# Patient Record
Sex: FEMALE | Race: WHITE | NOT HISPANIC OR LATINO | Employment: FULL TIME | URBAN - METROPOLITAN AREA
[De-identification: names, ages, dates, MRNs, and addresses within clinical notes are randomized per-mention and may not be internally consistent; named-entity substitution may affect disease eponyms.]

---

## 2017-01-03 ENCOUNTER — MYC MEDICAL ADVICE (OUTPATIENT)
Dept: FAMILY MEDICINE | Facility: OTHER | Age: 32
End: 2017-01-03

## 2017-01-03 DIAGNOSIS — H10.33 ACUTE BACTERIAL CONJUNCTIVITIS OF BOTH EYES: Primary | ICD-10-CM

## 2017-01-03 DIAGNOSIS — J01.90 ACUTE SINUSITIS WITH SYMPTOMS > 10 DAYS: ICD-10-CM

## 2017-01-03 RX ORDER — DOXYCYCLINE 100 MG/1
100 CAPSULE ORAL 2 TIMES DAILY
Qty: 20 CAPSULE | Refills: 0 | Status: SHIPPED | OUTPATIENT
Start: 2017-01-03 | End: 2017-01-13

## 2017-01-03 RX ORDER — POLYMYXIN B SULFATE AND TRIMETHOPRIM 1; 10000 MG/ML; [USP'U]/ML
1 SOLUTION OPHTHALMIC EVERY 4 HOURS
Qty: 1 BOTTLE | Refills: 0 | Status: SHIPPED | OUTPATIENT
Start: 2017-01-03 | End: 2017-04-21

## 2017-02-08 ENCOUNTER — TELEPHONE (OUTPATIENT)
Dept: FAMILY MEDICINE | Facility: OTHER | Age: 32
End: 2017-02-08

## 2017-02-08 NOTE — TELEPHONE ENCOUNTER
Reason for call:  Same Day Appointment  Reason for Call:  Same Day Appointment, Requested Provider:  MIRLANDE Priest     PCP: Brie Cuellar    Reason for visit: work comp injury 2/8/17, right wrist and thumb    Duration of symptoms: today    Have you been treated for this in the past? No    Additional comments: needs to be seen within 24 hours due to being WC.  No openings ERC, ZMC, RG, PMC    Can we leave a detailed message on this number? YES    Phone number patient can be reached at: Home number on file [phone] (home)    Best Time: any      Call taken on 2/8/2017 at 3:38 PM by Arianna Dominguez

## 2017-02-09 NOTE — TELEPHONE ENCOUNTER
Patient was able to get an appointment elsewhere. No longer needs appointment.  [place], [facility]

## 2017-02-09 NOTE — TELEPHONE ENCOUNTER
This was from yesterday. Does it still need to be addressed? If so, the only place I could work in is 5 pm today. Otherwise can check with others or wait until tomorrow.     Shawn Cuellar PA-C   [facility]

## 2017-02-21 ENCOUNTER — TELEPHONE (OUTPATIENT)
Dept: FAMILY MEDICINE | Facility: OTHER | Age: 32
End: 2017-02-21

## 2017-02-21 NOTE — TELEPHONE ENCOUNTER
"Leah Casarez is a 31 year old female     PRESENTING PROBLEM:      NURSING ASSESSMENT:  Description:  Patient calls to report that her current period and last period have been heavier than normal.  She reports last period was normal and red then slowed down and turned more pink/brown.  She started experiencing breast pain around that time and took and HPT.  HPT negative.  She reports that was previously on OCP x 6 months.  She Stopped OCP in November as she is TCC.  Her breasts continue to be painful. . Her current period started on 02/14 (approximate).  She reports it has been heavy and is persisting.  She is wearing a medium absorbancy pad.  She reports going through 5-6 per day.  She reports \"it went away Thursday and returned that night after intercourse.\"  She has not taken an HPT recently.  She is not experiencing any severe cramping, dizziness, weakness, confusion.  She does report feeling \"a little more tired than usual.\"   Onset/duration:  X 7 days   Precip. factors:  Recently stopped OCP, TTC  Associated symptoms:  See above  Pain scale (0-10)   Mild cramping  I & O/eating:   Not assessed  Activity:  Per normal  Temp.:  afebrile  Weight:    Wt Readings from Last 2 Encounters:   12/30/16 240 lb 14.4 oz (109.3 kg)   08/03/16 245 lb (111.1 kg)       Allergies: No Known Allergies  Last exam/Treatment:  12/30/2017  Contact Phone Number:  Home number on file    NURSING PLAN: Recommend retaking HPT to confirm patient is not pregnant.  If positive,call back.  Reassured patient that it may take a few months for cycles to regulate after stopping OCP.  She be seen right away if saturated >1 pad per hour over 6 hours, severe cramping and or feeling dizzy, weak, lightheaded, confused.  Call back if period persists through upcoming weekend.    RECOMMENDED DISPOSITION:  Home care advice - see nursing plan  Will comply with recommendation: Yes  If further questions/concerns or if symptoms do not improve, worsen or " new symptoms develop, call your PCP or [facility] as soon as possible.    Lena Paul RN

## 2017-02-21 NOTE — TELEPHONE ENCOUNTER
Reason for call:  Symptom  Reason for call:  Patient reporting a symptom    Symptom or request: ongoing heavy periods    Duration (how long have symptoms been present): 6 days    Have you been treated for this before? No    Additional comments: patient calling to go over heavy flow that they are having. Patient stated that it seems that it is a lot darker blood than bright red, has normal cramping with it (not everyday for cramping). Having breast pain for 3 weeks. Declined urgent triage line if needed    Phone Number patient can be reached at:  Cell number on file:    Telephone Information:   Mobile [phone]       Best Time:  any    Can we leave a detailed message on this number:  YES    Call taken on 2/21/2017 at 3:42 PM by Dorota Kenny

## 2017-04-21 ENCOUNTER — ALLIED HEALTH/NURSE VISIT (OUTPATIENT)
Dept: FAMILY MEDICINE | Facility: OTHER | Age: 32
End: 2017-04-21
Payer: COMMERCIAL

## 2017-04-21 VITALS — WEIGHT: 234 LBS | BODY MASS INDEX: 34.86 KG/M2

## 2017-04-21 DIAGNOSIS — Z32.00 PREGNANCY EXAMINATION OR TEST, PREGNANCY UNCONFIRMED: Primary | ICD-10-CM

## 2017-04-21 LAB — BETA HCG QUAL IFA URINE: POSITIVE

## 2017-04-21 PROCEDURE — 84703 CHORIONIC GONADOTROPIN ASSAY: CPT | Performed by: ADVANCED PRACTICE MIDWIFE

## 2017-04-21 ASSESSMENT — PAIN SCALES - GENERAL: PAINLEVEL: NO PAIN (0)

## 2017-04-21 NOTE — PROGRESS NOTES
Leah Casarez is a 31 year old here today for a pregnancy test.  LMP: Patient's last menstrual period was 2017 (exact date).  Wt: 0 lbs 0 oz.    Symptoms include weight gain, breast tenderness, absence of menses and fatigue.    Leah informed of positive pregnancy test results. JACKELIN: 17    Educational advice given: nutrition, smoking and drugs & alcohol.    Current medications reviewed: Yes    Previous pregnancy history remarkable for: none.    Plan: follow-up appointment with Rosie Layton for pre- care, take multivitamin or pre-beverly vitamins and OB Education packet given.    She is to call back if she has any questions or concerns.  She is advised to notify a provider immediately if she experiences any severe cramping or abdominal pain or any vaginal bleeding.    Audrey Rodgers, RN, BSN

## 2017-04-21 NOTE — PATIENT INSTRUCTIONS
"According to your last menstrual period, you are approximately 4 weeks and 6 days pregnant.  Your estimated due date is 12/23/17.    To do list:  1. If you have not already started taking a prenatal vitamin, please start today.  2. Visit with our OB Intake nurse, Fallon: this can be scheduled any time between today and the first visit with your provider. We do require that you see OB intake before your \"first OB\" visit.       Mondays- [place] (11am-7pm)       Tuesdays- [place] (9am-noon)/ [place] (1pm-4pm)       Wednesdays- [place] (8am-11am)       Thursdays- [place] (9am-3pm)  3. The first OB visit with Melanie Layton CNM is scheduled between 10 and 12 weeks: 5/27-6/10.    To make these appointments, or if you have any questions and would like to speak to your care team, you can call the clinic's main phone number:       [facility]: [phone]       [facility]: [phone]       [place]: [phone]       [facility]: [phone]       [facility]: [phone]    Please remember, we would like to hear from you if you have any vaginal bleeding or any moderate to severe abdominal pain/cramping. You can call any of the phone numbers above and speak with an RN promptly, even if it is after clinic hours, someone will answer your call.    Thank you for choosing [place], and Congratulations!    Audrey Rodgers RN, BSN      "

## 2017-04-21 NOTE — MR AVS SNAPSHOT
"              After Visit Summary   4/21/2017    Leah Casarez    MRN: [id number]           Patient Information     Date Of Birth          1985        Visit Information        Provider Department      4/21/2017 2:00 PM NL RN TEAM A, [facility]        Today's Diagnoses     Pregnancy examination or test, pregnancy unconfirmed    -  1      Care Instructions    According to your last menstrual period, you are approximately 4 weeks and 6 days pregnant.  Your estimated due date is 12/23/17.    To do list:  1. If you have not already started taking a prenatal vitamin, please start today.  2. Visit with our OB Intake nurse, Fallon: this can be scheduled any time between today and the first visit with your provider. We do require that you see OB intake before your \"first OB\" visit.       Mondays- [place] (11am-7pm)       Tuesdays- [place] (9am-noon)/ [place] (1pm-4pm)       Wednesdays- [place] (8am-11am)       Thursdays- [place] (9am-3pm)  3. The first OB visit with Melanie Layton CNM is scheduled between 10 and 12 weeks: 5/27-6/10.    To make these appointments, or if you have any questions and would like to speak to your care team, you can call the clinic's main phone number:       [facility]: [phone]       [facility]: [phone]       [facility]: [phone]       [facility]: [phone]       [facility]: [phone]    Please remember, we would like to hear from you if you have any vaginal bleeding or any moderate to severe abdominal pain/cramping. You can call any of the phone numbers above and speak with an RN promptly, even if it is after clinic hours, someone will answer your call.    Thank you for choosing [facility], and Congratulations!    Audrey Rodgers RN, BSN            Follow-ups after your visit        Your next 10 appointments already scheduled     Apr 25, 2017  3:30 PM CDT   New Prenatal with ESTELLA Marmolejo [facility]view " [facility] ([facility])    [address] [facility] [address] [phone]              Who to contact     If you have questions or need follow up information about today's clinic visit or your schedule please contact [facility] directly at [phone].  Normal or non-critical lab and imaging results will be communicated to you by MyChart, letter or phone within 4 business days after the clinic has received the results. If you do not hear from us within 7 days, please contact the clinic through MyChart or phone. If you have a critical or abnormal lab result, we will notify you by phone as soon as possible.  Submit refill requests through [employer] or call your pharmacy and they will forward the refill request to us. Please allow 3 business days for your refill to be completed.          Additional Information About Your Visit        [email or web address] Information     [employer] gives you secure access to your electronic health record. If you see a primary care provider, you can also send messages to your care team and make appointments. If you have questions, please call your primary care clinic.  If you do not have a primary care provider, please call [phone] and they will assist you.        Care EveryWhere ID     This is your Care EveryWhere ID. This could be used by other organizations to access your [place] medical records  [id number]        Your Vitals Were     Last Period Breastfeeding? BMI (Body Mass Index)             03/18/2017 (Exact Date) No 34.86 kg/m2          Blood Pressure from Last 3 Encounters:   12/30/16 116/62   08/03/16 124/80   02/18/16 112/66    Weight from Last 3 Encounters:   04/21/17 234 lb (106.1 kg)   12/30/16 240 lb 14.4 oz (109.3 kg)   08/03/16 245 lb (111.1 kg)              We Performed the Following     Beta HCG qual IFA urine        Primary Care Provider Office Phone # Fax #    Brie Cuellar PA-C [phone]        [facility] [address]  [facility] [address]        Thank you!     Thank you for choosing [facility]  for your care. Our goal is always to provide you with excellent care. Hearing back from our patients is one way we can continue to improve our services. Please take a few minutes to complete the written survey that you may receive in the mail after your visit with us. Thank you!             Your Updated Medication List - Protect others around you: Learn how to safely use, store and throw away your medicines at www.disposemymeds.org.          This list is accurate as of: 4/21/17  3:35 PM.  Always use your most recent med list.                   Brand Name Dispense Instructions for use    PRENATAL VITAMIN PO

## 2017-06-07 ENCOUNTER — RADIANT APPOINTMENT (OUTPATIENT)
Dept: ULTRASOUND IMAGING | Facility: OTHER | Age: 32
End: 2017-06-07
Attending: PHYSICIAN ASSISTANT
Payer: COMMERCIAL

## 2017-06-07 ENCOUNTER — PRENATAL OFFICE VISIT (OUTPATIENT)
Dept: FAMILY MEDICINE | Facility: OTHER | Age: 32
End: 2017-06-07
Payer: COMMERCIAL

## 2017-06-07 VITALS — BODY MASS INDEX: 34.51 KG/M2 | HEIGHT: 69 IN | WEIGHT: 233 LBS

## 2017-06-07 DIAGNOSIS — Z34.80 OTHER NORMAL PREGNANCY, NOT FIRST: Primary | ICD-10-CM

## 2017-06-07 DIAGNOSIS — Z34.80 OTHER NORMAL PREGNANCY, NOT FIRST: ICD-10-CM

## 2017-06-07 LAB
ALBUMIN UR-MCNC: NEGATIVE MG/DL
APPEARANCE UR: CLEAR
BILIRUB UR QL STRIP: NEGATIVE
COLOR UR AUTO: YELLOW
ERYTHROCYTE [DISTWIDTH] IN BLOOD BY AUTOMATED COUNT: 12.8 % (ref 10–15)
GLUCOSE UR STRIP-MCNC: NEGATIVE MG/DL
HCT VFR BLD AUTO: 35.3 % (ref 35–47)
HGB BLD-MCNC: 11.9 G/DL (ref 11.7–15.7)
HGB UR QL STRIP: NEGATIVE
KETONES UR STRIP-MCNC: NEGATIVE MG/DL
LEUKOCYTE ESTERASE UR QL STRIP: ABNORMAL
MCH RBC QN AUTO: 31.1 PG (ref 26.5–33)
MCHC RBC AUTO-ENTMCNC: 33.7 G/DL (ref 31.5–36.5)
MCV RBC AUTO: 92 FL (ref 78–100)
NITRATE UR QL: NEGATIVE
PH UR STRIP: 6 PH (ref 5–7)
PLATELET # BLD AUTO: 294 10E9/L (ref 150–450)
RBC # BLD AUTO: 3.83 10E12/L (ref 3.8–5.2)
RBC #/AREA URNS AUTO: NORMAL /HPF (ref 0–2)
SP GR UR STRIP: 1.01 (ref 1–1.03)
URN SPEC COLLECT METH UR: ABNORMAL
UROBILINOGEN UR STRIP-ACNC: 1 EU/DL (ref 0.2–1)
WBC # BLD AUTO: 11.4 10E9/L (ref 4–11)
WBC #/AREA URNS AUTO: NORMAL /HPF (ref 0–2)

## 2017-06-07 PROCEDURE — 87340 HEPATITIS B SURFACE AG IA: CPT | Performed by: FAMILY MEDICINE

## 2017-06-07 PROCEDURE — 86780 TREPONEMA PALLIDUM: CPT | Performed by: FAMILY MEDICINE

## 2017-06-07 PROCEDURE — 76801 OB US < 14 WKS SINGLE FETUS: CPT

## 2017-06-07 PROCEDURE — 86850 RBC ANTIBODY SCREEN: CPT | Performed by: FAMILY MEDICINE

## 2017-06-07 PROCEDURE — 86762 RUBELLA ANTIBODY: CPT | Performed by: FAMILY MEDICINE

## 2017-06-07 PROCEDURE — 85027 COMPLETE CBC AUTOMATED: CPT | Performed by: FAMILY MEDICINE

## 2017-06-07 PROCEDURE — 87389 HIV-1 AG W/HIV-1&-2 AB AG IA: CPT | Performed by: FAMILY MEDICINE

## 2017-06-07 PROCEDURE — 87086 URINE CULTURE/COLONY COUNT: CPT | Performed by: FAMILY MEDICINE

## 2017-06-07 PROCEDURE — 81001 URINALYSIS AUTO W/SCOPE: CPT | Performed by: FAMILY MEDICINE

## 2017-06-07 PROCEDURE — 86901 BLOOD TYPING SEROLOGIC RH(D): CPT | Performed by: FAMILY MEDICINE

## 2017-06-07 PROCEDURE — 36415 COLL VENOUS BLD VENIPUNCTURE: CPT | Performed by: FAMILY MEDICINE

## 2017-06-07 PROCEDURE — 86900 BLOOD TYPING SEROLOGIC ABO: CPT | Performed by: FAMILY MEDICINE

## 2017-06-07 NOTE — MR AVS SNAPSHOT
After Visit Summary   6/7/2017    Leah Casarez    MRN: [id number]           Patient Information     Date Of Birth          1985        Visit Information        Provider Department      6/7/2017 1:00 PM NL OB INTAKE [facility]        Today's Diagnoses     Other normal pregnancy, not first    -  1       Follow-ups after your visit        Your next 10 appointments already scheduled     Jun 07, 2017  2:30 PM CDT   US OB < 14 WEEKS SINGLE with ZMUS1   [facility]    [address] [id number] [phone]           Please bring a list of your medicines (including vitamins, minerals and over-the-counter drugs). Also, tell your doctor about any allergies you may have. Wear comfortable clothes and leave your valuables at home.  If you re less than 20 weeks drink four 8-ounce glasses of fluid an hour before your exam. If you need to empty your bladder before your exam, try to release only a little urine. Then, drink another glass of fluid.  You may have up to two family members in the exam room. If you bring a small child, an adult must be there to care for him or her.  Please call the Imaging Department at your exam site with any questions.            Jun 21, 2017  2:00 PM CDT   ESTABLISHED PRENATAL with Payal Messina PA-C   [facility]    [address] [place] [address]   [phone]            Jul 12, 2017  1:15 PM CDT   ESTABLISHED PRENATAL with Payal Messina PA-C   [facility]    [address] [place] [address]   [phone]            Aug 08, 2017  4:00 PM CDT   ESTABLISHED PRENATAL with Payal Messina PA-C   [facility]    [address] [place] [address]   [phone]              Future tests that were ordered for you today      "Open Future Orders        Priority Expected Expires Ordered    US OB < 14 Weeks Single Routine  6/7/2018 6/7/2017            Who to contact     If you have questions or need follow up information about today's clinic visit or your schedule please contact [facility] directly at [phone].  Normal or non-critical lab and imaging results will be communicated to you by MyChart, letter or phone within 4 business days after the clinic has received the results. If you do not hear from us within 7 days, please contact the clinic through [employer] or phone. If you have a critical or abnormal lab result, we will notify you by phone as soon as possible.  Submit refill requests through [employer] or call your pharmacy and they will forward the refill request to us. Please allow 3 business days for your refill to be completed.          Additional Information About Your Visit        MyChart Information     [employer] gives you secure access to your electronic health record. If you see a primary care provider, you can also send messages to your care team and make appointments. If you have questions, please call your primary care clinic.  If you do not have a primary care provider, please call [phone] and they will assist you.        Care EveryWhere ID     This is your Care EveryWhere ID. This could be used by other organizations to access your [place] medical records  [id number]        Your Vitals Were     Height Last Period BMI (Body Mass Index)             5' 9\" (1.753 m) 03/18/2017 (Exact Date) 34.41 kg/m2          Blood Pressure from Last 3 Encounters:   12/30/16 116/62   08/03/16 124/80   02/18/16 112/66    Weight from Last 3 Encounters:   06/07/17 233 lb (105.7 kg)   04/21/17 234 lb (106.1 kg)   12/30/16 240 lb 14.4 oz (109.3 kg)              We Performed the Following     ABO/Rh type and screen     Anti treponema EIA     CBC WITH PLATELETS     HEPATITIS B SURFACE ANTIGEN     HIV Antigen Antibody Combo     " Rubella Antibody IgG Quantitative     Urine Culture Aerobic Bacterial     URINE MACROSCOPIC WITH REFLEX TO MICRO     Urine Microscopic        Primary Care Provider Office Phone # Fax #    Brie Cuellar PA-C [phone]       [facility] [address]  [facility] [address]        Thank you!     Thank you for choosing [facility]  for your care. Our goal is always to provide you with excellent care. Hearing back from our patients is one way we can continue to improve our services. Please take a few minutes to complete the written survey that you may receive in the mail after your visit with us. Thank you!             Your Updated Medication List - Protect others around you: Learn how to safely use, store and throw away your medicines at www.disposemymeds.org.          This list is accurate as of: 6/7/17  1:35 PM.  Always use your most recent med list.                   Brand Name Dispense Instructions for use    PRENATAL VITAMIN PO

## 2017-06-07 NOTE — PROGRESS NOTES
1. Have you had chicken pox?   Yes    Genetic Screening    Has the patient, baby's father, or anyone in either family had:     1. Thalassemia and and MCV result less than 80?No   2.  Neural tube defect? No   3.  Congenital heart defect?No   4. Down's syndrome?No   5.  Colten-Sachs disease?No   6. Sickle Cell disease or trait?No   7. Hemophilia or other inherited problems of blood?No   8. Muscular dystropy?No   9.  Cystic fibrosis?No  10. Naylor's Chorea?No  11. Mental Retardation or autism?  No         If yes, was the person tested for Fragile X?   12. Any other inherited genetic or chromosomal disorders?No  13. Metabolic disorders such as diabetes or PKU?No  14. A child born with defects not listed above?No  15. Recurrent pregnancy loss or stillbirth?No  16.  Has the patient had any medications/street drugs/alcohol since her last menstrual period?No  18.  Does the patient or baby's father have any other genetic risks. No

## 2017-06-08 LAB
ABO + RH BLD: NORMAL
ABO + RH BLD: NORMAL
BLD GP AB SCN SERPL QL: NORMAL
BLOOD BANK CMNT PATIENT-IMP: NORMAL
HBV SURFACE AG SERPL QL IA: NONREACTIVE
HIV 1+2 AB+HIV1 P24 AG SERPL QL IA: NORMAL
SPECIMEN EXP DATE BLD: NORMAL

## 2017-06-09 LAB
RUBV IGG SERPL IA-ACNC: 44 IU/ML
T PALLIDUM IGG+IGM SER QL: NEGATIVE

## 2017-06-10 LAB
BACTERIA SPEC CULT: NORMAL
MICRO REPORT STATUS: NORMAL
SPECIMEN SOURCE: NORMAL

## 2017-06-10 NOTE — PROGRESS NOTES
Laeh - Your early ultrasound shows a normally progressing pregnancy with due date of 11/22.  We will review and discuss further at your upcoming first OB visit.  There is a subchorionic hemorrhage noted. These are a common finding in early ultrasounds and do not cause harm to the baby.  We do sometimes see some spotting and bleeding with these.  Let me know if you have any questions.

## 2017-06-21 ENCOUNTER — PRENATAL OFFICE VISIT (OUTPATIENT)
Dept: FAMILY MEDICINE | Facility: CLINIC | Age: 32
End: 2017-06-21
Payer: COMMERCIAL

## 2017-06-21 VITALS
SYSTOLIC BLOOD PRESSURE: 122 MMHG | HEART RATE: 78 BPM | WEIGHT: 229 LBS | TEMPERATURE: 98.2 F | BODY MASS INDEX: 33.82 KG/M2 | OXYGEN SATURATION: 99 % | DIASTOLIC BLOOD PRESSURE: 88 MMHG | RESPIRATION RATE: 18 BRPM

## 2017-06-21 DIAGNOSIS — Z34.00 SUPERVISION OF NORMAL FIRST PREGNANCY, ANTEPARTUM: Primary | ICD-10-CM

## 2017-06-21 PROCEDURE — 87591 N.GONORRHOEAE DNA AMP PROB: CPT | Performed by: PHYSICIAN ASSISTANT

## 2017-06-21 PROCEDURE — 99207 ZZC FIRST OB VISIT: CPT | Performed by: PHYSICIAN ASSISTANT

## 2017-06-21 PROCEDURE — G0145 SCR C/V CYTO,THINLAYER,RESCR: HCPCS | Performed by: PHYSICIAN ASSISTANT

## 2017-06-21 PROCEDURE — 87624 HPV HI-RISK TYP POOLED RSLT: CPT | Performed by: PHYSICIAN ASSISTANT

## 2017-06-21 PROCEDURE — 87491 CHLMYD TRACH DNA AMP PROBE: CPT | Performed by: PHYSICIAN ASSISTANT

## 2017-06-21 ASSESSMENT — PAIN SCALES - GENERAL: PAINLEVEL: NO PAIN (0)

## 2017-06-21 NOTE — NURSING NOTE
"Chief Complaint   Patient presents with     Prenatal Care       Initial /88  Pulse 78  Temp 98.2  F (36.8  C) (Tympanic)  Resp 18  Wt 229 lb (103.9 kg)  LMP 03/18/2017 (Exact Date)  SpO2 99%  BMI 33.82 kg/m2 Estimated body mass index is 33.82 kg/(m^2) as calculated from the following:    Height as of 6/7/17: 5' 9\" (1.753 m).    Weight as of this encounter: 229 lb (103.9 kg).  BP completed using cuff size: fatuma Bynum MA      "

## 2017-06-21 NOTE — MR AVS SNAPSHOT
After Visit Summary   6/21/2017    Leah Casarez    MRN: [id number]           Patient Information     Date Of Birth          1985        Visit Information        Provider Department      6/21/2017 2:00 PM Payal Messina PA-C [facility]        Care Instructions    Suggested Magnesium 400mg daily along with Riboflavin (Vitamin B2) 200mg daily.  Both of these products help prevent migraine headaches.             Follow-ups after your visit        Your next 10 appointments already scheduled     Jul 12, 2017  1:15 PM CDT   ESTABLISHED PRENATAL with Payal Messina PA-C   [facility] ([facility]    [address] [place] [address] [phone]            Aug 08, 2017  4:00 PM CDT   ESTABLISHED PRENATAL with Payal Messina PA-C   [facility] ([facility])    [address] [place] [id number] [phone]              Who to contact     If you have questions or need follow up information about today's clinic visit or your schedule please contact [facility] directly at [phone].  Normal or non-critical lab and imaging results will be communicated to you by MyChart, letter or phone within 4 business days after the clinic has received the results. If you do not hear from us within 7 days, please contact the clinic through [employer] or phone. If you have a critical or abnormal lab result, we will notify you by phone as soon as possible.  Submit refill requests through [employer] or call your pharmacy and they will forward the refill request to us. Please allow 3 business days for your refill to be completed.          Additional Information About Your Visit        MyChart Information     [employer] gives you secure access to your electronic health record. If you see a primary care provider, you can also send messages to your care team and make appointments. If you have  questions, please call your primary care clinic.  If you do not have a primary care provider, please call [phone] and they will assist you.        Care EveryWhere ID     This is your Care EveryWhere ID. This could be used by other organizations to access your [place] medical records  [id number]        Your Vitals Were     Pulse Temperature Respirations Last Period Pulse Oximetry BMI (Body Mass Index)    78 98.2  F (36.8  C) (Tympanic) 18 03/18/2017 (Exact Date) 99% 33.82 kg/m2       Blood Pressure from Last 3 Encounters:   06/21/17 122/88   12/30/16 116/62   08/03/16 124/80    Weight from Last 3 Encounters:   06/21/17 229 lb (103.9 kg)   06/07/17 233 lb (105.7 kg)   04/21/17 234 lb (106.1 kg)              Today, you had the following     No orders found for display       Primary Care Provider Office Phone # Fax #    Brie L CATIA Cuellar [phone]       [facility] [address]  [facility] [address]        Equal Access to Services     SANJUANITA HAMILTON : Hadii aad ku hadasho Soomaali, waaxda luqadaha, qaybta kaalmada adeegyada, maddy yu haymichn jovon granados . So [facility] [phone].    ATENCIÓN: Si habla español, tiene a issa disposición servicios gratuitos de asistencia lingüística. Llame al [phone].    We comply with applicable federal civil rights laws and [place] laws. We do not discriminate on the basis of race, color, national origin, age, disability sex, sexual orientation or gender identity.            Thank you!     Thank you for choosing [facility]  for your care. Our goal is always to provide you with excellent care. Hearing back from our patients is one way we can continue to improve our services. Please take a few minutes to complete the written survey that you may receive in the mail after your visit with us. Thank you!             Your Updated Medication List - Protect others around you: Learn how to safely use,  store and throw away your medicines at www.disposemymeds.org.          This list is accurate as of: 6/21/17  2:55 PM.  Always use your most recent med list.                   Brand Name Dispense Instructions for use Diagnosis    PRENATAL VITAMIN PO

## 2017-06-21 NOTE — PROGRESS NOTES
"  SUBJECTIVE:                                                    Leah Casarez is a 32 year old female who presents to clinic today for the following health issues:      Vaginal Symptoms     Onset: ***    Description:  Vaginal Discharge: {.:553907::\"none\"}   Itching (Pruritis): { :923305}  Burning sensation:  { :702406}  Odor: { :951876}    Accompanying Signs & Symptoms:  Pain with Urination: { :193832}  Abdominal Pain: { :399469}  Fever: { :392338}   History:   Sexually active: { :742717}  New Partner: { :997469}  Possibility of Pregnancy:  { :314979::\"No\"}    Precipitating factors:   Recent Antibiotic Use: { :556391}    Alleviating factors:  ***   Therapies Tried and outcome: ***    {additional problems for provider to add:225329}    Problem list and histories reviewed & adjusted, as indicated.  Additional history: {NONE - AS DOCUMENTED:180689::\"as documented\"}    {HIST REVIEW/ LINKS 2:432214}    Reviewed and updated as needed this visit by clinical staff       Reviewed and updated as needed this visit by Provider         {PROVIDER CHARTING PREFERENCE:589135}    "

## 2017-06-21 NOTE — PATIENT INSTRUCTIONS
Suggested Magnesium 400mg daily along with Riboflavin (Vitamin B2) 200mg daily.  Both of these products help prevent migraine headaches.

## 2017-06-23 LAB
C TRACH DNA SPEC QL NAA+PROBE: NORMAL
N GONORRHOEA DNA SPEC QL NAA+PROBE: NORMAL
SPECIMEN SOURCE: NORMAL
SPECIMEN SOURCE: NORMAL

## 2017-06-26 LAB
COPATH REPORT: NORMAL
PAP: NORMAL

## 2017-06-28 LAB
FINAL DIAGNOSIS: NORMAL
HPV HR 12 DNA CVX QL NAA+PROBE: NEGATIVE
HPV16 DNA SPEC QL NAA+PROBE: NEGATIVE
HPV18 DNA SPEC QL NAA+PROBE: NEGATIVE
SPECIMEN DESCRIPTION: NORMAL

## 2017-07-12 ENCOUNTER — PRENATAL OFFICE VISIT (OUTPATIENT)
Dept: FAMILY MEDICINE | Facility: CLINIC | Age: 32
End: 2017-07-12
Payer: COMMERCIAL

## 2017-07-12 VITALS
DIASTOLIC BLOOD PRESSURE: 68 MMHG | SYSTOLIC BLOOD PRESSURE: 116 MMHG | RESPIRATION RATE: 18 BRPM | BODY MASS INDEX: 33.97 KG/M2 | OXYGEN SATURATION: 99 % | HEART RATE: 62 BPM | WEIGHT: 230 LBS | TEMPERATURE: 97.7 F

## 2017-07-12 DIAGNOSIS — Z34.00 SUPERVISION OF NORMAL FIRST PREGNANCY, ANTEPARTUM: Primary | ICD-10-CM

## 2017-07-12 PROCEDURE — 99207 ZZC PRENATAL VISIT: CPT | Performed by: PHYSICIAN ASSISTANT

## 2017-07-12 ASSESSMENT — PAIN SCALES - GENERAL: PAINLEVEL: NO PAIN (0)

## 2017-07-12 NOTE — MR AVS SNAPSHOT
After Visit Summary   7/12/2017    Leah Casarez    MRN: [id number]           Patient Information     Date Of Birth          1985        Visit Information        Provider Department      7/12/2017 1:15 PM Payal Messina PA-C [facility]        Today's Diagnoses     Supervision of normal first pregnancy, antepartum    -  1       Follow-ups after your visit        Your next 10 appointments already scheduled     Aug 08, 2017  3:00 PM CDT   US OB > 14 WEEKS COMPLETE SINGLE with PHUS1   [facility]ound ([facility])    [address] [place] [address] [phone]           Please bring a list of your medicines (including vitamins, minerals and over-the-counter drugs). Also, tell your doctor about any allergies you may have. Wear comfortable clothes and leave your valuables at home.  If you re less than 20 weeks drink four 8-ounce glasses of fluid an hour before your exam. If you need to empty your bladder before your exam, try to release only a little urine. Then, drink another glass of fluid.  You may have up to two family members in the exam room. If you bring a small child, an adult must be there to care for him or her.  Please call the Imaging Department at your exam site with any questions.            Aug 08, 2017  4:00 PM CDT   ESTABL[facility] PRENATAL with Payal Messina PA-C   [facility]    [address] [place] [id number] [phone]              Who to contact     If you have questions or need follow up information about today's clinic visit or your schedule please contact [facility] directly at [phone].  Normal or non-critical lab and imaging results will be communicated to you by MyChart, letter or phone within 4 business days after the clinic has received the results. If you do not hear from us within 7 days, please contact the clinic  through Haitaobei or phone. If you have a critical or abnormal lab result, we will notify you by phone as soon as possible.  Submit refill requests through Haitaobei or call your pharmacy and they will forward the refill request to us. Please allow 3 business days for your refill to be completed.          Additional Information About Your Visit        SolarBuddyhart Information     Haitaobei gives you secure access to your electronic health record. If you see a primary care provider, you can also send messages to your care team and make appointments. If you have questions, please call your primary care clinic.  If you do not have a primary care provider, please call [phone] and they will assist you.        Care EveryWhere ID     This is your Care EveryWhere ID. This could be used by other organizations to access your [facility] medical records  [id number]        Your Vitals Were     Pulse Temperature Respirations Last Period Pulse Oximetry BMI (Body Mass Index)    62 97.7  F (36.5  C) (Tympanic) 18 03/18/2017 (Exact Date) 99% 33.97 kg/m2       Blood Pressure from Last 3 Encounters:   07/12/17 116/68   06/21/17 122/88   12/30/16 116/62    Weight from Last 3 Encounters:   07/12/17 230 lb (104.3 kg)   06/21/17 229 lb (103.9 kg)   06/07/17 233 lb (105.7 kg)              Today, you had the following     No orders found for display       Primary Care Provider Office Phone # Fax #    Payal Messina PA-C [phone]       [address] [place] [address]        Equal Access to Services     TRAVIS HAMILTON : Hadii aad ku hadasho Soomaali, waaxda luqadaha, qaybta kaalmada adeegyada, maddy granados . So [facility] [phone].    ATENCIÓN: Si habla español, tiene a issa disposición servicios gratuitos de asistencia lingüística. Llame al [phone].    We comply with applicable federal civil rights laws and [place] laws. We do not discriminate on the basis of race,  color, national origin, age, disability sex, sexual orientation or gender identity.            Thank you!     Thank you for choosing [facility]  for your care. Our goal is always to provide you with excellent care. Hearing back from our patients is one way we can continue to improve our services. Please take a few minutes to complete the written survey that you may receive in the mail after your visit with us. Thank you!             Your Updated Medication List - Protect others around you: Learn how to safely use, store and throw away your medicines at www.disposemymeds.org.          This list is accurate as of: 7/12/17  3:15 PM.  Always use your most recent med list.                   Brand Name Dispense Instructions for use Diagnosis    MAGNESIUM OXIDE PO      Take 400 mg by mouth daily        PRENATAL VITAMIN PO           VITAMIN B-2 PO      Take 200 mg % by mouth

## 2017-07-12 NOTE — NURSING NOTE
"Chief Complaint   Patient presents with     Prenatal Care       Initial /68  Pulse 62  Temp 97.7  F (36.5  C) (Tympanic)  Resp 18  Wt 230 lb (104.3 kg)  LMP 03/18/2017 (Exact Date)  SpO2 99%  BMI 33.97 kg/m2 Estimated body mass index is 33.97 kg/(m^2) as calculated from the following:    Height as of 6/7/17: 5' 9\" (1.753 m).    Weight as of this encounter: 230 lb (104.3 kg).  BP completed using cuff size: fatuma Bynum MA      "

## 2017-07-12 NOTE — PROGRESS NOTES
Doing well.  No concerns today other than persistent headaches. She has been using vitamin D2 and magnesium without much luck. No visual disturbance. tend to be in the occipital area extending up into the bilateral temporal area. I suggested she think about craniosacral therapy to work on these areas-she will think about it and let me know. Can place an order if she would like to move forward with this.   Taking PNV.  Discussed quad screening. Prefer to do this test prior to 20 week ultrasound.  Patient wishes to defer. 20 week US for full fetal anatomical survey ordered today.  RTC in 4 weeks    Electronically signed by Payal Messina PA-C  7/12/2017

## 2017-08-08 ENCOUNTER — PRENATAL OFFICE VISIT (OUTPATIENT)
Dept: FAMILY MEDICINE | Facility: CLINIC | Age: 32
End: 2017-08-08
Payer: COMMERCIAL

## 2017-08-08 ENCOUNTER — HOSPITAL ENCOUNTER (OUTPATIENT)
Dept: ULTRASOUND IMAGING | Facility: CLINIC | Age: 32
Discharge: HOME | End: 2017-08-08
Attending: PHYSICIAN ASSISTANT | Admitting: PHYSICIAN ASSISTANT
Payer: COMMERCIAL

## 2017-08-08 ENCOUNTER — MYC MEDICAL ADVICE (OUTPATIENT)
Dept: FAMILY MEDICINE | Facility: CLINIC | Age: 32
End: 2017-08-08

## 2017-08-08 VITALS
DIASTOLIC BLOOD PRESSURE: 78 MMHG | WEIGHT: 231 LBS | RESPIRATION RATE: 18 BRPM | HEART RATE: 82 BPM | BODY MASS INDEX: 34.11 KG/M2 | SYSTOLIC BLOOD PRESSURE: 122 MMHG | TEMPERATURE: 97.5 F | OXYGEN SATURATION: 100 %

## 2017-08-08 DIAGNOSIS — J30.2 SEASONAL ALLERGIC RHINITIS, UNSPECIFIED CHRONICITY, UNSPECIFIED TRIGGER: ICD-10-CM

## 2017-08-08 DIAGNOSIS — Z34.00 SUPERVISION OF NORMAL FIRST PREGNANCY, ANTEPARTUM: ICD-10-CM

## 2017-08-08 DIAGNOSIS — Z34.00 SUPERVISION OF NORMAL FIRST PREGNANCY, ANTEPARTUM: Primary | ICD-10-CM

## 2017-08-08 PROBLEM — Z13.6 CARDIOVASCULAR SCREENING; LDL GOAL LESS THAN 160: Status: ACTIVE | Noted: 2017-08-08

## 2017-08-08 PROCEDURE — 76805 OB US >/= 14 WKS SNGL FETUS: CPT

## 2017-08-08 PROCEDURE — 99213 OFFICE O/P EST LOW 20 MIN: CPT | Mod: 25 | Performed by: PHYSICIAN ASSISTANT

## 2017-08-08 PROCEDURE — 99207 ZZC PRENATAL VISIT: CPT | Performed by: PHYSICIAN ASSISTANT

## 2017-08-08 RX ORDER — MONTELUKAST SODIUM 10 MG/1
10 TABLET ORAL AT BEDTIME
Qty: 90 TABLET | Refills: 1 | Status: SHIPPED | OUTPATIENT
Start: 2017-08-08 | End: 2018-12-13

## 2017-08-08 RX ORDER — FLUTICASONE PROPIONATE 50 MCG
2 SPRAY, SUSPENSION (ML) NASAL DAILY
Qty: 1 BOTTLE | Refills: 11 | Status: SHIPPED | OUTPATIENT
Start: 2017-08-08 | End: 2018-12-13

## 2017-08-08 ASSESSMENT — PAIN SCALES - GENERAL: PAINLEVEL: NO PAIN (0)

## 2017-08-08 NOTE — MR AVS SNAPSHOT
After Visit Summary   8/8/2017    Leah Casarez    MRN: [id number]           Patient Information     Date Of Birth          1985        Visit Information        Provider Department      8/8/2017 4:00 PM Payal Messina PA-C [facility]        Today's Diagnoses     Supervision of normal first pregnancy, antepartum    -  1    Seasonal allergic rhinitis, unspecified chronicity, unspecified trigger           Follow-ups after your visit        Your next 10 appointments already scheduled     Aug 31, 2017  3:45 PM CDT   ESTABLISHED PRENATAL with Payal Messina PA-C   [facility]    [address] [place] [address]   [phone]            Oct 02, 2017  3:45 PM CDT   ESTABLISHED PRENATAL with Payal Messina PA-C   [facility]    [address] [place] [address]   [phone]            Oct 18, 2017  3:45 PM CDT   ESTABLISHED PRENATAL with Payal Messina PA-C   [facility]    [address] [place] [address]   [phone]            Nov 01, 2017  3:30 PM CDT   ESTABLISHED PRENATAL with Payal Messina PA-C   [facility]    [address] [place] [address]   [phone]            Nov 15, 2017  3:45 PM CST   ESTABLISHED PRENATAL with Payal Messina PA-C   [facility]    [address] [place] [phone] [phone]              Who to contact     If you have questions or need follow up information about today's clinic visit or your schedule please contact [facility] directly at [phone].  Normal or non-critical lab and imaging results will be communicated to you by MyChart, letter or phone within 4 business days after the clinic has received the results. If you  do not hear from us within 7 days, please contact the clinic through Druidly or phone. If you have a critical or abnormal lab result, we will notify you by phone as soon as possible.  Submit refill requests through Druidly or call your pharmacy and they will forward the refill request to us. Please allow 3 business days for your refill to be completed.          Additional Information About Your Visit        [email or web address] Information     Druidly gives you secure access to your electronic health record. If you see a primary care provider, you can also send messages to your care team and make appointments. If you have questions, please call your primary care clinic.  If you do not have a primary care provider, please call [phone] and they will assist you.        Care EveryWhere ID     This is your Care EveryWhere ID. This could be used by other organizations to access your [facility] medical records  [id number]        Your Vitals Were     Pulse Temperature Respirations Last Period Pulse Oximetry BMI (Body Mass Index)    82 97.5  F (36.4  C) (Tympanic) 18 03/18/2017 (Exact Date) 100% 34.11 kg/m2       Blood Pressure from Last 3 Encounters:   08/08/17 122/78   07/12/17 116/68   06/21/17 122/88    Weight from Last 3 Encounters:   08/08/17 231 lb (104.8 kg)   07/12/17 230 lb (104.3 kg)   06/21/17 229 lb (103.9 kg)              Today, you had the following     No orders found for display         Today's Medication Changes          These changes are accurate as of: 8/8/17  5:10 PM.  If you have any questions, ask your nurse or doctor.               Start taking these medicines.        Dose/Directions    fluticasone 50 MCG/ACT spray   Commonly known as:  FLONASE   Used for:  Seasonal allergic rhinitis, unspecified chronicity, unspecified trigger   Started by:  Payal Messina PA-C        Dose:  2 spray   Spray 2 sprays into both nostrils daily   Quantity:  1 Bottle   Refills:  11       montelukast 10 MG tablet   Commonly  known as:  SINGULAIR   Used for:  Seasonal allergic rhinitis, unspecified chronicity, unspecified trigger   Started by:  Payal Messina PA-C        Dose:  10 mg   Take 1 tablet (10 mg) by mouth At Bedtime   Quantity:  90 tablet   Refills:  1            Where to get your medicines      These medications were sent to [facility] [place] [address]  [address] [address]     Phone:  [phone]     fluticasone 50 MCG/ACT spray    montelukast 10 MG tablet                Primary Care Provider Office Phone # Fax #    Payal Messina PA-C [phone] [address] [place] [address]        Equal Access to Services     [facility] : Hadii krysta ku hadasho Soomaali, waaxda luqadaha, qaybta kaalmada adeegyada, waxay idiin hayjoyce granados . So [facility] [phone].    ATENCIÓN: Si habla español, tiene a issa disposición servicios gratuitos de asistencia lingüística. [facility] [phone].    We comply with applicable federal civil rights laws and [place] laws. We do not discriminate on the basis of race, color, national origin, age, disability sex, sexual orientation or gender identity.            Thank you!     Thank you for choosing [facility]  for your care. Our goal is always to provide you with excellent care. Hearing back from our patients is one way we can continue to improve our services. Please take a few minutes to complete the written survey that you may receive in the mail after your visit with us. Thank you!             Your Updated Medication List - Protect others around you: Learn how to safely use, store and throw away your medicines at www.disposemymeds.org.          This list is accurate as of: 8/8/17  5:10 PM.  Always use your most recent med list.                   Brand Name Dispense Instructions for use Diagnosis    fluticasone 50 MCG/ACT spray    FLONASE    1 Bottle    Spray 2 sprays into both  nostrils daily    Seasonal allergic rhinitis, unspecified chronicity, unspecified trigger       montelukast 10 MG tablet    SINGULAIR    90 tablet    Take 1 tablet (10 mg) by mouth At Bedtime    Seasonal allergic rhinitis, unspecified chronicity, unspecified trigger       PRENATAL VITAMIN PO

## 2017-08-08 NOTE — NURSING NOTE
"Chief Complaint   Patient presents with     Prenatal Care       Initial /78  Pulse 82  Temp 97.5  F (36.4  C) (Tympanic)  Resp 18  Wt 231 lb (104.8 kg)  LMP 03/18/2017 (Exact Date)  SpO2 100%  BMI 34.11 kg/m2 Estimated body mass index is 34.11 kg/(m^2) as calculated from the following:    Height as of 6/7/17: 5' 9\" (1.753 m).    Weight as of this encounter: 231 lb (104.8 kg).  BP completed using cuff size: fatuma Bynum MA      "

## 2017-08-08 NOTE — PROGRESS NOTES
SUBJECTIVE:                                                    Leah Casarez is a 32 year old female who presents to clinic today for the following health issues:      ALLERGIES- history of seasonal allergies in the fall but usually not this early. Has started plain Allegra on a consistent basis but is not finding good results for postnasal drainage and congestion. No significant cough or wheezing-does not have history of asthma. No signs of illness such as fever.      Onset: Past few weeks    Description:   Nasal congestion: YES  Sneezing: YES  Red, itchy eyes: no     Progression of Symptoms:  same    Accompanying Signs & Symptoms:  Cough: no  Wheezing: no  Rash: no  Sinus/facial pain: YES- maxillary    History:   Is it seasonal: in the fall   History of Asthma: no  Has allergy testing been done: no    Precipitating factors:   None    Alleviating factors:  None       Therapies Tried and outcome: Allegra not great results. Has used a nasal spray about 8 years ago but did not find it effective-has not used it since.            Problem list and histories reviewed & adjusted, as indicated.  Additional history: as documented    Patient Active Problem List   Diagnosis     Gluten intolerance     Obesity (BMI 35.0-39.9 without comorbidity)     Supervision of normal first pregnancy, antepartum     Seasonal allergic rhinitis, unspecified chronicity, unspecified trigger     CARDIOVASCULAR SCREENING; LDL GOAL LESS THAN 160     Past Surgical History:   Procedure Laterality Date     NO HISTORY OF SURGERY         Social History   Substance Use Topics     Smoking status: Never Smoker     Smokeless tobacco: Never Used     Alcohol use No     Family History   Problem Relation Age of Onset     Arthritis Mother      DIABETES Maternal Grandfather      Type II     Hyperlipidemia Paternal Grandmother              Reviewed and updated as needed this visit by clinical staffTobacco  Allergies  Meds       Reviewed and updated as  needed this visit by Provider         ROS:  Constitutional, HEENT, cardiovascular, pulmonary, gi and gu systems are negative, except as otherwise noted.      OBJECTIVE:   /78  Pulse 82  Temp 97.5  F (36.4  C) (Tympanic)  Resp 18  Wt 231 lb (104.8 kg)  LMP 03/18/2017 (Exact Date)  SpO2 100%  BMI 34.11 kg/m2  Body mass index is 34.11 kg/(m^2).   GENERAL: healthy, alert and no distress  EYES: Eyes grossly normal to inspection, PERRL and conjunctivae and sclerae normal  HENT: ear canals and TM's normal, nasal mucosa edematous , rhinorrhea clear, oropharynx clear, oral mucous membranes moist, sinuses: maxillary tenderness on both sides and increased postnasal drainage.  NECK: no adenopathy, no asymmetry, masses, or scars and thyroid normal to palpation  RESP: lungs clear to auscultation - no rales, rhonchi or wheezes  CV: regular rate and rhythm, normal S1 S2, no S3 or S4, no murmur, click or rub, no peripheral edema and peripheral pulses strong  MS: no gross musculoskeletal defects noted, no edema  SKIN: no suspicious lesions or rashes    Diagnostic Test Results:  none     ASSESSMENT:   Seasonal allergies    PLAN:       ICD-10-CM    1. Supervision of normal first pregnancy, antepartum Z34.00    2. Seasonal allergic rhinitis, unspecified chronicity, unspecified trigger J30.2 montelukast (SINGULAIR) 10 MG tablet     fluticasone (FLONASE) 50 MCG/ACT spray           MEDICATIONS:        - Trial of Singulair 10 mg daily       - Start taking Flonase on a consistent basis-the importance of this reviewed with her today.       - Continue other medications without change - may continue Allegra  FUTURE APPOINTMENTS:       - Follow-up visit in one month for OB-sooner if worsening symptoms.  Suggested watching what may be bothering her in avoiding these allergens.    Payal Messina PA-C  [facility]    Orders Placed This Encounter     montelukast (SINGULAIR) 10 MG tablet     fluticasone (FLONASE) 50  MCG/ACT spray       AVS given to patient upon discharge today.  Electronically signed by Payal Messina PA-C  August 8, 2017  5:07 PM

## 2017-08-08 NOTE — PROGRESS NOTES
Leah - your ultrasound appears normal as far as fetal anatomy. Fluid levels look good, heart rate looks normal. Placenta is low-lying very close to the cervical opening. It is important that we monitor this carefully. I will order an ultrasound to correlate with your next OB visit to recheck the placental placement. If you have any spotting or bleeding he should alert us immediately. There are no restrictions surrounding a low-lying placenta. I will have my care  contact you regarding the ultrasound appointment time of your next visit.

## 2017-08-08 NOTE — PROGRESS NOTES
Doing well.  Headaches have been off and on but better since last visit. She is struggling a lot with seasonal allergies. See second soap note regarding physical and treatment plan surrounding this issue.   Taking PNV.  20 week US for full fetal anatomical survey was done today - will contact her with results as soon as radiology has reviewed.   RTC in 4 weeks    Electronically signed by Payal Messina PA-C  8/8/2017

## 2017-08-09 ENCOUNTER — TELEPHONE (OUTPATIENT)
Dept: FAMILY MEDICINE | Facility: CLINIC | Age: 32
End: 2017-08-09

## 2017-08-09 ENCOUNTER — MYC MEDICAL ADVICE (OUTPATIENT)
Dept: FAMILY MEDICINE | Facility: CLINIC | Age: 32
End: 2017-08-09

## 2017-08-09 DIAGNOSIS — O44.40 LOW-LYING PLACENTA: Primary | ICD-10-CM

## 2017-08-09 NOTE — TELEPHONE ENCOUNTER
----- Message from Payal Messina PA-C sent at 8/8/2017  5:06 PM CDT -----  Leah - your ultrasound appears normal as far as fetal anatomy. Fluid levels look good, heart rate looks normal. Placenta is low-lying very close to the cervical opening. It is important that we monitor this carefully. I will order an ultrasound to correlate with your next OB visit to recheck the placental placement. If you have any spotting or bleeding he should alert us immediately. There are no restrictions surrounding a low-lying placenta. I will have my care  contact you regarding the ultrasound appointment time of your next visit.

## 2017-08-31 ENCOUNTER — HOSPITAL ENCOUNTER (OUTPATIENT)
Dept: ULTRASOUND IMAGING | Facility: CLINIC | Age: 32
Discharge: HOME | End: 2017-08-31
Attending: PHYSICIAN ASSISTANT | Admitting: PHYSICIAN ASSISTANT
Payer: COMMERCIAL

## 2017-08-31 ENCOUNTER — PRENATAL OFFICE VISIT (OUTPATIENT)
Dept: FAMILY MEDICINE | Facility: CLINIC | Age: 32
End: 2017-08-31
Payer: COMMERCIAL

## 2017-08-31 VITALS
DIASTOLIC BLOOD PRESSURE: 70 MMHG | TEMPERATURE: 97.1 F | WEIGHT: 229 LBS | OXYGEN SATURATION: 99 % | RESPIRATION RATE: 18 BRPM | BODY MASS INDEX: 33.82 KG/M2 | SYSTOLIC BLOOD PRESSURE: 116 MMHG | HEART RATE: 72 BPM

## 2017-08-31 DIAGNOSIS — Z34.00 SUPERVISION OF NORMAL FIRST PREGNANCY, ANTEPARTUM: Primary | ICD-10-CM

## 2017-08-31 DIAGNOSIS — O44.40 LOW-LYING PLACENTA: ICD-10-CM

## 2017-08-31 PROCEDURE — 76815 OB US LIMITED FETUS(S): CPT

## 2017-08-31 PROCEDURE — 99207 ZZC PRENATAL VISIT: CPT | Performed by: PHYSICIAN ASSISTANT

## 2017-08-31 ASSESSMENT — PAIN SCALES - GENERAL: PAINLEVEL: NO PAIN (0)

## 2017-08-31 NOTE — NURSING NOTE
"Chief Complaint   Patient presents with     Prenatal Care       Initial /70  Pulse 72  Temp 97.1  F (36.2  C) (Tympanic)  Resp 18  Wt 229 lb (103.9 kg)  LMP 03/18/2017 (Exact Date)  SpO2 99%  BMI 33.82 kg/m2 Estimated body mass index is 33.82 kg/(m^2) as calculated from the following:    Height as of 6/7/17: 5' 9\" (1.753 m).    Weight as of this encounter: 229 lb (103.9 kg).  BP completed using cuff size: fatuma Bynum MA      "

## 2017-08-31 NOTE — MR AVS SNAPSHOT
After Visit Summary   8/31/2017    Leah Casarez    MRN: [id number]           Patient Information     Date Of Birth          1985        Visit Information        Provider Department      8/31/2017 3:45 PM Payal Messina PA-C [facility]        Today's Diagnoses     Supervision of normal first pregnancy, antepartum    -  1       Follow-ups after your visit        Your next 10 appointments already scheduled     Oct 02, 2017  3:45 PM CDT   ESTABLISHED PRENATAL with Payal Messina PA-C   [facility]    [address] [place] [address]   [phone]            Oct 18, 2017  3:45 PM CDT   ESTABLISHED PRENATAL with Payal Messina PA-C   [facility]    [address] [place] [address]   [phone]            Nov 01, 2017  3:30 PM CDT   ESTABLISHED PRENATAL with Payal Messina PA-C   [facility]    [address] [place] [address] [phone]            Nov 15, 2017  3:45 PM CST   ESTABLISHED PRENATAL with Payal Messina PA-C   [facility]    [address] [place] [address] [phone]              Who to contact     If you have questions or need follow up information about today's clinic visit or your schedule please contact [facility] directly at [phone].  Normal or non-critical lab and imaging results will be communicated to you by Parudihart, letter or phone within 4 business days after the clinic has received the results. If you do not hear from us within 7 days, please contact the clinic through Parudihart or phone. If you have a critical or abnormal lab result, we will notify you by phone as soon as possible.  Submit refill requests through [employer] or call your pharmacy and they will forward the refill request to  us. Please allow 3 business days for your refill to be completed.          Additional Information About Your Visit        MyChart Information     OptoNovahart gives you secure access to your electronic health record. If you see a primary care provider, you can also send messages to your care team and make appointments. If you have questions, please call your primary care clinic.  If you do not have a primary care provider, please call [phone] and they will assist you.        Care EveryWhere ID     This is your Care EveryWhere ID. This could be used by other organizations to access your [facility] medical records  [id number]        Your Vitals Were     Pulse Temperature Respirations Last Period Pulse Oximetry BMI (Body Mass Index)    72 97.1  F (36.2  C) (Tympanic) 18 03/18/2017 (Exact Date) 99% 33.82 kg/m2       Blood Pressure from Last 3 Encounters:   08/31/17 116/70   08/08/17 122/78   07/12/17 116/68    Weight from Last 3 Encounters:   08/31/17 229 lb (103.9 kg)   08/08/17 231 lb (104.8 kg)   07/12/17 230 lb (104.3 kg)              Today, you had the following     No orders found for display       Primary Care Provider Office Phone # Fax #    Payal Messina PA-C [phone] [address] [place] [address]        Equal Access to Services     TRAVIS HAMILTON : Hadii aad ku hadasho Soomaali, waaxda luqadaha, qaybta kaalmada adeegyada, maddy stevens. So [facility] [phone].    ATENCIÓN: Si habla español, tiene a issa disposición servicios gratuitos de asistencia lingüística. Llame al [phone].    We comply with applicable federal civil rights laws and [place] laws. We do not discriminate on the basis of race, color, national origin, age, disability sex, sexual orientation or gender identity.            Thank you!     Thank you for choosing [facility]  for your care. Our goal is always to provide you with excellent care. Hearing back from our  patients is one way we can continue to improve our services. Please take a few minutes to complete the written survey that you may receive in the mail after your visit with us. Thank you!             Your Updated Medication List - Protect others around you: Learn how to safely use, store and throw away your medicines at www.disposemymeds.org.          This list is accurate as of: 8/31/17  5:21 PM.  Always use your most recent med list.                   Brand Name Dispense Instructions for use Diagnosis    fluticasone 50 MCG/ACT spray    FLONASE    1 Bottle    Spray 2 sprays into both nostrils daily    Seasonal allergic rhinitis, unspecified chronicity, unspecified trigger       montelukast 10 MG tablet    SINGULAIR    90 tablet    Take 1 tablet (10 mg) by mouth At Bedtime    Seasonal allergic rhinitis, unspecified chronicity, unspecified trigger       PRENATAL VITAMIN PO

## 2017-09-05 NOTE — PROGRESS NOTES
Leah - your ultrasound shows that the placenta has moved up nicely.  We can discuss further at your next OB visit. Follow up ultrasound should not be needed.

## 2017-10-02 ENCOUNTER — PRENATAL OFFICE VISIT (OUTPATIENT)
Dept: FAMILY MEDICINE | Facility: CLINIC | Age: 32
End: 2017-10-02
Payer: COMMERCIAL

## 2017-10-02 VITALS
SYSTOLIC BLOOD PRESSURE: 116 MMHG | BODY MASS INDEX: 34.7 KG/M2 | HEART RATE: 82 BPM | WEIGHT: 235 LBS | DIASTOLIC BLOOD PRESSURE: 68 MMHG | TEMPERATURE: 96.9 F | RESPIRATION RATE: 18 BRPM

## 2017-10-02 DIAGNOSIS — Z23 NEED FOR VACCINATION: Primary | ICD-10-CM

## 2017-10-02 DIAGNOSIS — Z34.00 SUPERVISION OF NORMAL FIRST PREGNANCY, ANTEPARTUM: ICD-10-CM

## 2017-10-02 DIAGNOSIS — Z34.00 SUPERVISION OF NORMAL FIRST PREGNANCY, ANTEPARTUM: Primary | ICD-10-CM

## 2017-10-02 LAB
GLUCOSE 1H P 50 G GLC PO SERPL-MCNC: 108 MG/DL (ref 60–129)
HGB BLD-MCNC: 11.2 G/DL (ref 11.7–15.7)

## 2017-10-02 PROCEDURE — 36415 COLL VENOUS BLD VENIPUNCTURE: CPT | Performed by: PHYSICIAN ASSISTANT

## 2017-10-02 PROCEDURE — 82950 GLUCOSE TEST: CPT | Performed by: PHYSICIAN ASSISTANT

## 2017-10-02 PROCEDURE — 90715 TDAP VACCINE 7 YRS/> IM: CPT | Performed by: PHYSICIAN ASSISTANT

## 2017-10-02 PROCEDURE — 00000218 ZZHCL STATISTIC OBHBG - HEMOGLOBIN: Performed by: PHYSICIAN ASSISTANT

## 2017-10-02 PROCEDURE — 86780 TREPONEMA PALLIDUM: CPT | Performed by: PHYSICIAN ASSISTANT

## 2017-10-02 PROCEDURE — 99207 ZZC PRENATAL VISIT: CPT | Mod: 25 | Performed by: PHYSICIAN ASSISTANT

## 2017-10-02 PROCEDURE — 90471 IMMUNIZATION ADMIN: CPT | Performed by: PHYSICIAN ASSISTANT

## 2017-10-02 ASSESSMENT — PAIN SCALES - GENERAL: PAINLEVEL: NO PAIN (0)

## 2017-10-02 NOTE — NURSING NOTE
"Chief Complaint   Patient presents with     Prenatal Care       Initial /68  Pulse 82  Temp 96.9  F (36.1  C) (Tympanic)  Resp 18  Wt 235 lb (106.6 kg)  LMP 03/18/2017 (Exact Date)  BMI 34.7 kg/m2 Estimated body mass index is 34.7 kg/(m^2) as calculated from the following:    Height as of 6/7/17: 5' 9\" (1.753 m).    Weight as of this encounter: 235 lb (106.6 kg).  BP completed using cuff size: estefani Bynum MA      "

## 2017-10-02 NOTE — NURSING NOTE
Prior to injection verified patient identity using patient's name and date of birth.  Per orders of Payal Messina injection of Tdap  given by Sally Bynum MA. Patient instructed to remain in clinic for 20 minutes afterwards and to report any adverse reaction to me immediately.         Screening Questionnaire for Adult Immunization    Are you sick today?   No   Do you have allergies to medications, food, a vaccine component or latex?   No   Have you ever had a serious reaction after receiving a vaccination?   No   Do you have a long-term health problem with heart disease, lung disease, asthma, kidney disease, metabolic disease (e.g. diabetes), anemia, or other blood disorder?   No   Do you have cancer, leukemia, HIV/AIDS, or any other immune system problem?   No   In the past 3 months, have you taken medications that affect  your immune system, such as prednisone, other steroids, or anticancer drugs; drugs for the treatment of rheumatoid arthritis, Crohn s disease, or psoriasis; or have you had radiation treatments?   No   Have you had a seizure, or a brain or other nervous system problem?   No   During the past year, have you received a transfusion of blood or blood     products, or been given immune (gamma) globulin or antiviral drug?   No   For women: Are you pregnant or is there a chance you could become        pregnant during the next month?   No   Have you received any vaccinations in the past 4 weeks?   No     Immunization questionnaire answers were all negative.           Screening performed by Courtney Bynum on 10/2/2017 at 4:24 PM.

## 2017-10-02 NOTE — MR AVS SNAPSHOT
After Visit Summary   10/2/2017    Leah Casarez    MRN: [id number]           Patient Information     Date Of Birth          1985        Visit Information        Provider Department      10/2/2017 3:45 PM Payal Messina PA-C [facility]        Today's Diagnoses     Need for vaccination    -  1    Supervision of normal first pregnancy, antepartum           Follow-ups after your visit        Your next 10 appointments already scheduled     Oct 18, 2017  3:45 PM CDT   ESTABLISHED PRENATAL with Payal Messina PA-C   [facility] ([address] [id number] [phone]            Nov 01, 2017  3:30 PM CDT   ESTABLISHED PRENATAL with Payal Messina PA-C   [facility] ([address] [id number] [phone]            Nov 15, 2017  3:45 PM CST   ESTABLISHED PRENATAL with Payal Messina PA-C   [facility] ([address] [id number] [phone]              Who to contact     If you have questions or need follow up information about today's clinic visit or your schedule please contact [facility] directly at [phone].  Normal or non-critical lab and imaging results will be communicated to you by MyChart, letter or phone within 4 business days after the clinic has received the results. If you do not hear from us within 7 days, please contact the clinic through MyChart or phone. If you have a critical or abnormal lab result, we will notify you by phone as soon as possible.  Submit refill requests through [facility] or call your pharmacy and they will forward the refill request to us. Please allow 3 business days for your refill to be completed.          Additional Information About Your Visit        [facility] Information     [facility] gives you secure access to  your electronic health record. If you see a primary care provider, you can also send messages to your care team and make appointments. If you have questions, please call your primary care clinic.  If you do not have a primary care provider, please call [phone] and they will assist you.        Care EveryWhere ID     This is your Care EveryWhere ID. This could be used by other organizations to access your [place] [facility] medical records  [id number]        Your Vitals Were     Pulse Temperature Respirations Last Period BMI (Body Mass Index)       82 96.9  F (36.1  C) (Tympanic) 18 03/18/2017 (Exact Date) 34.7 kg/m2        Blood Pressure from Last 3 Encounters:   10/02/17 116/68   08/31/17 116/70   08/08/17 122/78    Weight from Last 3 Encounters:   10/02/17 235 lb (106.6 kg)   08/31/17 229 lb (103.9 kg)   08/08/17 231 lb (104.8 kg)              We Performed the Following     1st  Administration  [[id number]]     Anti Treponema     Glucose tolerance, gest screen, 1 hour     OB hemoglobin     TDAP VACCINE (ADACEL) [24517.002]        Primary Care Provider Office Phone # Fax #    Payal Messina PA-C [phone]       [address] [place] [address]        Equal Access to Services     TRAVIS HAMILTON AH: Hadii aad ku hadasho Soomaali, waaxda luqadaha, qaybta kaalmada adeegyada, waxay idiin hayaan jovon stevens. So [facility] [phone].    ATENCIÓN: Si habla español, tiene a issa disposición servicios gratuitos de asistencia lingüística. Llame al [phone].    We comply with applicable federal civil rights laws and [place] laws. We do not discriminate on the basis of race, color, national origin, age, disability, sex, sexual orientation, or gender identity.            Thank you!     Thank you for choosing [facility]  for your care. Our goal is always to provide you with excellent care. Hearing back from our patients is one way we can continue to improve our services. Please take a few  minutes to complete the written survey that you may receive in the mail after your visit with us. Thank you!             Your Updated Medication List - Protect others around you: Learn how to safely use, store and throw away your medicines at www.disposemymeds.org.          This list is accurate as of: 10/2/17  4:24 PM.  Always use your most recent med list.                   Brand Name Dispense Instructions for use Diagnosis    fluticasone 50 MCG/ACT spray    FLONASE    1 Bottle    Spray 2 sprays into both nostrils daily    Seasonal allergic rhinitis, unspecified chronicity, unspecified trigger       montelukast 10 MG tablet    SINGULAIR    90 tablet    Take 1 tablet (10 mg) by mouth At Bedtime    Seasonal allergic rhinitis, unspecified chronicity, unspecified trigger       PRENATAL VITAMIN PO

## 2017-10-02 NOTE — PROGRESS NOTES
Doing well.  No concerns today. Taking PNV.   tdap given today.  Will be getting flu shot through work.  OB labs including 1hr GTT, Anti Treponema and Hemoglobin will be done today.  Prenatal flowsheet information is reviewed.  Discussed kick counts and fetal movement.  Discussed opportunity to meet with OB coordinator surrounding education inclusive of third trimester of pregnancy.  Also encouraged birthing & lactation classes  offered at [facility].  I will contact her with lab results through [facility] if active or by phone call.      RTC in 2 weeks    Electronically signed by Payal Messina PA-C  10/2/2017

## 2017-10-03 LAB — T PALLIDUM IGG+IGM SER QL: NEGATIVE

## 2017-10-17 ENCOUNTER — TELEPHONE (OUTPATIENT)
Dept: OBGYN | Facility: CLINIC | Age: 32
End: 2017-10-17
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
## 2017-10-17 NOTE — TELEPHONE ENCOUNTER
Called pt and left a msg that we will put in the referral and she can all specialty to  her appt with Elisa Bynum MA
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
## 2017-10-18 ENCOUNTER — PRENATAL OFFICE VISIT (OUTPATIENT)
Dept: FAMILY MEDICINE | Facility: CLINIC | Age: 32
End: 2017-10-18
Payer: COMMERCIAL

## 2017-10-18 VITALS
DIASTOLIC BLOOD PRESSURE: 78 MMHG | SYSTOLIC BLOOD PRESSURE: 118 MMHG | HEART RATE: 88 BPM | TEMPERATURE: 98 F | OXYGEN SATURATION: 99 % | WEIGHT: 235 LBS | BODY MASS INDEX: 34.7 KG/M2 | RESPIRATION RATE: 18 BRPM

## 2017-10-18 DIAGNOSIS — S20.219A CHEST WALL CONTUSION, UNSPECIFIED LATERALITY, INITIAL ENCOUNTER: ICD-10-CM

## 2017-10-18 DIAGNOSIS — Z34.80 ENCOUNTER FOR SUPERVISION OF OTHER NORMAL PREGNANCY, UNSPECIFIED TRIMESTER: Primary | ICD-10-CM

## 2017-10-18 PROBLEM — Z34.00 SUPERVISION OF NORMAL FIRST PREGNANCY, ANTEPARTUM: Status: RESOLVED | Noted: 2017-06-21 | Resolved: 2017-10-18

## 2017-10-18 PROCEDURE — 99213 OFFICE O/P EST LOW 20 MIN: CPT | Mod: 25 | Performed by: PHYSICIAN ASSISTANT

## 2017-10-18 PROCEDURE — 99207 ZZC PRENATAL VISIT: CPT | Performed by: PHYSICIAN ASSISTANT

## 2017-10-18 ASSESSMENT — PAIN SCALES - GENERAL: PAINLEVEL: MODERATE PAIN (5)
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
## 2017-10-18 NOTE — NURSING NOTE
"Chief Complaint   Patient presents with     Prenatal Care       Initial /78  Pulse 88  Temp 98  F (36.7  C) (Tympanic)  Resp 18  Wt 235 lb (106.6 kg)  LMP 03/18/2017 (Exact Date)  SpO2 99%  BMI 34.7 kg/m2 Estimated body mass index is 34.7 kg/(m^2) as calculated from the following:    Height as of 6/7/17: 5' 9\" (1.753 m).    Weight as of this encounter: 235 lb (106.6 kg).  BP completed using cuff size: fatuma Bynum MA      "

## 2017-10-23 ENCOUNTER — THERAPY VISIT (OUTPATIENT)
Dept: CHIROPRACTIC MEDICINE | Facility: CLINIC | Age: 32
End: 2017-10-23
Payer: COMMERCIAL

## 2017-10-23 DIAGNOSIS — S13.9XXA SPRAIN OF NECK: Primary | ICD-10-CM

## 2017-10-23 DIAGNOSIS — M99.03 SEGMENTAL DYSFUNCTION OF LUMBAR REGION: ICD-10-CM

## 2017-10-23 DIAGNOSIS — M99.01 SEGMENTAL DYSFUNCTION OF CERVICAL REGION: ICD-10-CM

## 2017-10-23 DIAGNOSIS — M54.50 LUMBAGO: ICD-10-CM

## 2017-10-23 DIAGNOSIS — M99.04 SEGMENTAL DYSFUNCTION OF SACRAL REGION: ICD-10-CM

## 2017-10-23 DIAGNOSIS — G44.209 TENSION HEADACHE: ICD-10-CM

## 2017-10-23 DIAGNOSIS — M99.02 SEGMENTAL DYSFUNCTION OF THORACIC REGION: ICD-10-CM

## 2017-10-23 PROCEDURE — 98941 CHIROPRACT MANJ 3-4 REGIONS: CPT | Mod: AT | Performed by: CHIROPRACTOR

## 2017-10-23 PROCEDURE — 99203 OFFICE O/P NEW LOW 30 MIN: CPT | Mod: 25 | Performed by: CHIROPRACTOR
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
## 2017-10-23 NOTE — MR AVS SNAPSHOT
After Visit Summary   10/23/2017    Leah Casarez    MRN: [id number]           Patient Information     Date Of Birth          1985        Visit Information        Provider Department      10/23/2017 2:30 PM Yoana Keller DC [facility]        Today's Diagnoses     Sprain of neck    -  1    Segmental dysfunction of cervical region        Segmental dysfunction of thoracic region        Tension headache        Segmental dysfunction of sacral region        Segmental dysfunction of lumbar region        Lumbago           Follow-ups after your visit        Your next 10 appointments already scheduled     Oct 25, 2017  3:45 PM CDT   [facility] Chiropractor with Yoana Keller DC   [facility] ([facility]    [address] [place] [address]   [phone]            Nov 01, 2017  3:30 PM CDT   ESTABLISHED PRENATAL with Payal Messina PA-C   [facility] ([facility])    [address] [place] [address]   [phone]            Nov 13, 2017  4:30 PM CST   ESTABLISHED PRENATAL with Payal Messina PA-C   [facility] ([facility])    [address] [place] [address]   [phone]            Nov 29, 2017  3:45 PM CST   MyChart OB-GYN Established Prenatal with Payal Messina PA-C   [facility] ([facility])    [address] [place] [address]   [phone]            Dec 06, 2017  3:45 PM CST   MyChart OB-GYN Established Prenatal with Payal Messina PA-C   [facility] ([facility])    [address] [place] [address]   [phone]            Dec 13, 2017  3:45 PM CST   MyChart OB-GYN Established Prenatal with Payal Messina PA-C   [facility] ([facility])    [address] [place] [address]   [phone]             Dec 20, 2017  3:45 PM CST   [facility] Established Prenatal with Payal Messina PA-C   [facility] ([facility])    [address] [place] [address] [phone]              Who to contact     If you have questions or need follow up information about today's clinic visit or your schedule please contact [facility] directly at [phone].  Normal or non-critical lab and imaging results will be communicated to you by [employer], letter or phone within 4 business days after the clinic has received the results. If you do not hear from us within 7 days, please contact the clinic through Nimbu[employer] or phone. If you have a critical or abnormal lab result, we will notify you by phone as soon as possible.  Submit refill requests through [employer] or call your pharmacy and they will forward the refill request to us. Please allow 3 business days for your refill to be completed.          Additional Information About Your Visit        [employer] Information     [employer] gives you secure access to your electronic health record. If you see a primary care provider, you can also send messages to your care team and make appointments. If you have questions, please call your primary care clinic.  If you do not have a primary care provider, please call [phone] and they will assist you.        Care EveryWhere ID     This is your Care EveryWhere ID. This could be used by other organizations to access your [facility] medical records  [id number]        Your Vitals Were     Last Period                   03/18/2017 (Exact Date)            Blood Pressure from Last 3 Encounters:   10/18/17 118/78   10/02/17 116/68   08/31/17 116/70    Weight from Last 3 Encounters:   10/18/17 106.6 kg (235 lb)   10/02/17 106.6 kg (235 lb)   08/31/17 103.9 kg (229 lb)              We Performed the Following     CHIROPRAC MANIP,SPINAL,3-4 REGIONS     OFFICE/OUTPT VISIT,NEW,LEVL III         Primary Care Provider Office Phone # Fax #    Payal Messina PA-C [phone] [address] [place] [address]        Equal Access to Services     TRAVIS HAMILTON : Maia krysta crain tierao Soshantali, waaxda luqadaha, qaybta kaalmada adekarlo, maddy fordandie rodney. So [facility] [phone].    ATENCIÓN: Si habla español, tiene a issa disposición servicios gratuitos de asistencia lingüística. Llame al [phone].    We comply with applicable federal civil rights laws and [place] laws. We do not discriminate on the basis of race, color, national origin, age, disability, sex, sexual orientation, or gender identity.            Thank you!     Thank you for choosing [facility]  for your care. Our goal is always to provide you with excellent care. Hearing back from our patients is one way we can continue to improve our services. Please take a few minutes to complete the written survey that you may receive in the mail after your visit with us. Thank you!             Your Updated Medication List - Protect others around you: Learn how to safely use, store and throw away your medicines at www.disposemymeds.org.          This list is accurate as of: 10/23/17  3:45 PM.  Always use your most recent med list.                   Brand Name Dispense Instructions for use Diagnosis    acetaminophen-codeine 300-30 MG per tablet    TYLENOL #3    50 tablet    Take 1-2 tablets by mouth every 4 hours as needed for pain    Chest wall contusion, unspecified laterality, initial encounter       fluticasone 50 MCG/ACT spray    FLONASE    1 Bottle    Spray 2 sprays into both nostrils daily    Seasonal allergic rhinitis, unspecified chronicity, unspecified trigger       montelukast 10 MG tablet    SINGULAIR    90 tablet    Take 1 tablet (10 mg) by mouth At Bedtime    Seasonal allergic rhinitis, unspecified chronicity, unspecified trigger       PRENATAL VITAMIN PO

## 2017-10-25 ENCOUNTER — THERAPY VISIT (OUTPATIENT)
Dept: CHIROPRACTIC MEDICINE | Facility: CLINIC | Age: 32
End: 2017-10-25
Payer: COMMERCIAL

## 2017-10-25 DIAGNOSIS — G44.209 TENSION HEADACHE: ICD-10-CM

## 2017-10-25 DIAGNOSIS — M54.50 LUMBAGO: ICD-10-CM

## 2017-10-25 DIAGNOSIS — M99.02 SEGMENTAL DYSFUNCTION OF THORACIC REGION: ICD-10-CM

## 2017-10-25 DIAGNOSIS — M99.01 SEGMENTAL DYSFUNCTION OF CERVICAL REGION: ICD-10-CM

## 2017-10-25 DIAGNOSIS — M99.03 SEGMENTAL DYSFUNCTION OF LUMBAR REGION: ICD-10-CM

## 2017-10-25 DIAGNOSIS — S13.9XXA SPRAIN OF NECK: ICD-10-CM

## 2017-10-25 DIAGNOSIS — M99.04 SEGMENTAL DYSFUNCTION OF SACRAL REGION: ICD-10-CM

## 2017-10-25 PROCEDURE — 98941 CHIROPRACT MANJ 3-4 REGIONS: CPT | Mod: AT | Performed by: CHIROPRACTOR
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
## 2017-11-01 ENCOUNTER — PRENATAL OFFICE VISIT (OUTPATIENT)
Dept: FAMILY MEDICINE | Facility: CLINIC | Age: 32
End: 2017-11-01
Payer: COMMERCIAL

## 2017-11-01 VITALS
BODY MASS INDEX: 35 KG/M2 | OXYGEN SATURATION: 99 % | HEART RATE: 88 BPM | SYSTOLIC BLOOD PRESSURE: 122 MMHG | RESPIRATION RATE: 18 BRPM | DIASTOLIC BLOOD PRESSURE: 62 MMHG | WEIGHT: 237 LBS | TEMPERATURE: 97 F

## 2017-11-01 DIAGNOSIS — Z34.80 ENCOUNTER FOR SUPERVISION OF OTHER NORMAL PREGNANCY, UNSPECIFIED TRIMESTER: Primary | ICD-10-CM

## 2017-11-01 DIAGNOSIS — S33.4XXA DISLOCATION OF SYMPHYSIS PUBIS, INITIAL ENCOUNTER: ICD-10-CM

## 2017-11-01 PROCEDURE — 99207 ZZC PRENATAL VISIT: CPT | Performed by: PHYSICIAN ASSISTANT

## 2017-11-01 ASSESSMENT — PAIN SCALES - GENERAL: PAINLEVEL: WORST PAIN (10)

## 2017-11-01 NOTE — PROGRESS NOTES
Worsening pubic symphysis pain. T #3 not working well.  She has been seen our chiropractor here for her low back discomfort, has mentioned the pubic symphysis pain to her, but has not been addressed. Suggested she see our physical therapist here Edith Aguayo who works on this type of issue-will put a referral in.  May consider a V strap as well for support.  No vaginal bleeding, leakage, or contractions.  Baby's movement is active and frequent.  Taking PNV.  Discussed kick counts and fetal movement.  Signs/symptoms of hypertension and pre-eclampsia reviewed with patient today. She should contact the hospital OB dept if any new concerning symptoms.  Reminded of upcoming GBS swab.      RTC in 2 weeks.     Electronically signed by Payal Messina PA-C  11/1/2017

## 2017-11-01 NOTE — MR AVS SNAPSHOT
"              After Visit Summary   11/1/2017    Leah Casarez    MRN: [id number]           Patient Information     Date Of Birth          1985        Visit Information        Provider Department      11/1/2017 3:30 PM Payal Messina PA-C [facility]        Today's Diagnoses     Encounter for supervision of other normal pregnancy, unspecified trimester    -  1    Dislocation of symphysis pubis, initial encounter           Follow-ups after your visit        Additional Services     PHYSICAL THERAPY REFERRAL       Needs to see Edith for this       *This therapy referral will be filtered to a centralized scheduling office at [facility] and the patient will receive a call to schedule an appointment at a [place] location most convenient for them. *     [facility] provides Physical Therapy evaluation and treatment and many specialty services across the [place] system.  If requesting a specialty program, please choose from the list below.    If you have not heard from the scheduling office within 2 business days, please call [phone] for all locations, with the exception of [place], please call [phone].  Treatment: Evaluation & Treatment  Special Instructions/Modalities: none  Special Programs: None    Please be aware that coverage of these services is subject to the terms and limitations of your health insurance plan.  Call member services at your health plan with any benefit or coverage questions.      **Note to Provider:  If you are referring outside of [place] for the therapy appointment, please list the name of the location in the \"special instructions\" above, print the referral and give to the patient to schedule the appointment.                  Your next 10 appointments already scheduled     Nov 13, 2017  4:30 PM CST   ESTABLISHED PRENATAL with Payal Messina PA-C   [facility] ([facility])    [address] [address]   [phone]            Nov 29, 2017  3:45 PM CST   MyChart OB-GYN Established Prenatal with Payal Messina PA-C   [facility] ([address] [address]   [phone]            Dec 06, 2017  3:45 PM CST   MyChart OB-GYN Established Prenatal with Payal Messina PA-C   [facility] ([facility])    [address] [place] [address]   [phone]            Dec 13, 2017  3:45 PM CST   MyChart OB-GYN Established Prenatal with Payal Messina PA-C   [facility] ([address] [address]   [phone]            Dec 20, 2017  3:45 PM CST   [facility] Established Prenatal with Payal Messina PA-C   [facility] ([address] [address]   [phone]              Who to contact     If you have questions or need follow up information about today's clinic visit or your schedule please contact [facility] directly at [phone].  Normal or non-critical lab and imaging results will be communicated to you by MyChart, letter or phone within 4 business days after the clinic has received the results. If you do not hear from us within 7 days, please contact the clinic through MyChart or phone. If you have a critical or abnormal lab result, we will notify you by phone as soon as possible.  Submit refill requests through [employer] or call your pharmacy and they will forward the refill request to us. Please allow 3 business days for your refill to be completed.          Additional Information About Your Visit        Online[employer]art Information     [employer] gives you secure access to your electronic health record. If you see a primary care provider, you can also send messages to your care team and make appointments. If you have  questions, please call your primary care clinic.  If you do not have a primary care provider, please call [phone] and they will assist you.        Care EveryWhere ID     This is your Care EveryWhere ID. This could be used by other organizations to access your [place] medical records  [id number]        Your Vitals Were     Pulse Temperature Respirations Last Period Pulse Oximetry BMI (Body Mass Index)    88 97  F (36.1  C) (Tympanic) 18 03/18/2017 (Exact Date) 99% 35 kg/m2       Blood Pressure from Last 3 Encounters:   11/01/17 122/62   10/18/17 118/78   10/02/17 116/68    Weight from Last 3 Encounters:   11/01/17 237 lb (107.5 kg)   10/18/17 235 lb (106.6 kg)   10/02/17 235 lb (106.6 kg)              We Performed the Following     PHYSICAL THERAPY REFERRAL        Primary Care Provider Office Phone # Fax #    Payal Messina PA-C [phone]       [address] [place] [address]        Equal Access to Services     [facility]: Hadii aad ku hadasho Soomaali, waaxda luqadaha, qaybta kaalmada adeegyada, waxay aimee hayjoyce granados . So [facility] [phone].    ATENCIÓN: Si habla español, tiene a issa disposición servicios gratuitos de asistencia lingüística. Llame al [phone].    We comply with applicable federal civil rights laws and [place] laws. We do not discriminate on the basis of race, color, national origin, age, disability, sex, sexual orientation, or gender identity.            Thank you!     Thank you for choosing [facility]  for your care. Our goal is always to provide you with excellent care. Hearing back from our patients is one way we can continue to improve our services. Please take a few minutes to complete the written survey that you may receive in the mail after your visit with us. Thank you!             Your Updated Medication List - Protect others around you: Learn how to safely use, store and throw away your medicines at  www.disposemymeds.org.          This list is accurate as of: 11/1/17  5:19 PM.  Always use your most recent med list.                   Brand Name Dispense Instructions for use Diagnosis    acetaminophen-codeine 300-30 MG per tablet    TYLENOL #3    50 tablet    Take 1-2 tablets by mouth every 4 hours as needed for pain    Chest wall contusion, unspecified laterality, initial encounter       fluticasone 50 MCG/ACT spray    FLONASE    1 Bottle    Spray 2 sprays into both nostrils daily    Seasonal allergic rhinitis, unspecified chronicity, unspecified trigger       montelukast 10 MG tablet    SINGULAIR    90 tablet    Take 1 tablet (10 mg) by mouth At Bedtime    Seasonal allergic rhinitis, unspecified chronicity, unspecified trigger       PRENATAL VITAMIN PO

## 2017-11-01 NOTE — NURSING NOTE
"Chief Complaint   Patient presents with     Prenatal Care       Initial /62  Pulse 88  Temp 97  F (36.1  C) (Tympanic)  Resp 18  Wt 237 lb (107.5 kg)  LMP 03/18/2017 (Exact Date)  SpO2 99%  BMI 35 kg/m2 Estimated body mass index is 35 kg/(m^2) as calculated from the following:    Height as of 6/7/17: 5' 9\" (1.753 m).    Weight as of this encounter: 237 lb (107.5 kg).  BP completed using cuff size: fatuma Bynum MA      "

## 2017-11-13 ENCOUNTER — PRENATAL OFFICE VISIT (OUTPATIENT)
Dept: FAMILY MEDICINE | Facility: CLINIC | Age: 32
End: 2017-11-13
Payer: COMMERCIAL

## 2017-11-13 VITALS
BODY MASS INDEX: 35.44 KG/M2 | WEIGHT: 240 LBS | RESPIRATION RATE: 18 BRPM | DIASTOLIC BLOOD PRESSURE: 68 MMHG | OXYGEN SATURATION: 98 % | HEART RATE: 68 BPM | SYSTOLIC BLOOD PRESSURE: 116 MMHG | TEMPERATURE: 96.8 F

## 2017-11-13 DIAGNOSIS — Z34.80 ENCOUNTER FOR SUPERVISION OF OTHER NORMAL PREGNANCY, UNSPECIFIED TRIMESTER: Primary | ICD-10-CM

## 2017-11-13 PROCEDURE — 99207 ZZC PRENATAL VISIT: CPT | Performed by: PHYSICIAN ASSISTANT

## 2017-11-13 ASSESSMENT — PAIN SCALES - GENERAL: PAINLEVEL: NO PAIN (0)

## 2017-11-13 NOTE — MR AVS SNAPSHOT
After Visit Summary   11/13/2017    Leah Casarez    MRN: [id number]           Patient Information     Date Of Birth          1985        Visit Information        Provider Department      11/13/2017 4:30 PM Payal Messina PA-C [facility]        Today's Diagnoses     Encounter for supervision of other normal pregnancy, unspecified trimester    -  1       Follow-ups after your visit        Your next 10 appointments already scheduled     Nov 29, 2017  3:45 PM CST   [facility] Established Prenatal with Payal Messina PA-C   [facility] ([facility])    [address] [place] [address]   [phone]            Dec 06, 2017  3:45 PM CST   [facility] Established Prenatal with Payal Messina PA-C   [facility] ([facility])    [address] [place] [address]   [phone]            Dec 13, 2017  3:45 PM CST   [facility] Established Prenatal with Payal Messina PA-C   [facility] ([facility])    [address] [place] [address]   [phone]            Dec 20, 2017  3:45 PM CST   [facility] Established Prenatal with Payal Messina PA-C   [facility] ([facility])    [address] [place] [address]   [phone]              Who to contact     If you have questions or need follow up information about today's clinic visit or your schedule please contact [facility] directly at [phone].  Normal or non-critical lab and imaging results will be communicated to you by MyChart, letter or phone within 4 business days after the clinic has received the results. If you do not hear from us within 7 days, please contact the clinic through MyChart or phone. If you have a critical or abnormal lab result, we will notify you by phone as soon as possible.  Submit refill  requests through [employer] or call your pharmacy and they will forward the refill request to us. Please allow 3 business days for your refill to be completed.          Additional Information About Your Visit        [employer] Information     [employer] gives you secure access to your electronic health record. If you see a primary care provider, you can also send messages to your care team and make appointments. If you have questions, please call your primary care clinic.  If you do not have a primary care provider, please call [phone] and they will assist you.        Care EveryWhere ID     This is your Care EveryWhere ID. This could be used by other organizations to access your [place] medical records  [id number]        Your Vitals Were     Pulse Temperature Respirations Last Period Pulse Oximetry BMI (Body Mass Index)    68 96.8  F (36  C) (Tympanic) 18 03/18/2017 (Exact Date) 98% 35.44 kg/m2       Blood Pressure from Last 3 Encounters:   11/13/17 116/68   11/01/17 122/62   10/18/17 118/78    Weight from Last 3 Encounters:   11/13/17 240 lb (108.9 kg)   11/01/17 237 lb (107.5 kg)   10/18/17 235 lb (106.6 kg)              Today, you had the following     No orders found for display       Primary Care Provider Office Phone # Fax #    Payal Messina PA-C [phone] [address] [place] [address]        Equal Access to Services     [facility] : Hadii aad ku hadasho Soomaali, waaxda luqadaha, qaybta kaalmada adejumanayada, maddy granados . So [facility] [phone].    ATENCIÓN: Si habla español, tiene a issa disposición servicios gratuitos de asistencia lingüística. Llame al [phone].    We comply with applicable federal civil rights laws and [place] laws. We do not discriminate on the basis of race, color, national origin, age, disability, sex, sexual orientation, or gender identity.            Thank you!     Thank you for choosing [facility]  for  your care. Our goal is always to provide you with excellent care. Hearing back from our patients is one way we can continue to improve our services. Please take a few minutes to complete the written survey that you may receive in the mail after your visit with us. Thank you!             Your Updated Medication List - Protect others around you: Learn how to safely use, store and throw away your medicines at www.disposemymeds.org.          This list is accurate as of: 11/13/17  5:18 PM.  Always use your most recent med list.                   Brand Name Dispense Instructions for use Diagnosis    acetaminophen-codeine 300-30 MG per tablet    TYLENOL #3    50 tablet    Take 1-2 tablets by mouth every 4 hours as needed for pain    Chest wall contusion, unspecified laterality, initial encounter       fluticasone 50 MCG/ACT spray    FLONASE    1 Bottle    Spray 2 sprays into both nostrils daily    Seasonal allergic rhinitis, unspecified chronicity, unspecified trigger       montelukast 10 MG tablet    SINGULAIR    90 tablet    Take 1 tablet (10 mg) by mouth At Bedtime    Seasonal allergic rhinitis, unspecified chronicity, unspecified trigger       PRENATAL VITAMIN PO

## 2017-11-13 NOTE — PROGRESS NOTES
Doing well.  No concerns today.  No vaginal bleeding, leakage, or contractions.  Baby's movement is active and frequent.  Taking PNV.  Discussed kick counts and fetal movement.  Signs/symptoms of hypertension and pre-eclampsia reviewed with patient today. She should contact the hospital OB dept if any new concerning symptoms.  Reminded of upcoming GBS swab.      RTC in 2 weeks.     Electronically signed by Payal Messina PA-C  11/13/2017

## 2017-11-13 NOTE — NURSING NOTE
"Chief Complaint   Patient presents with     Prenatal Care       Initial /68  Pulse 68  Temp 96.8  F (36  C) (Tympanic)  Resp 18  Wt 240 lb (108.9 kg)  LMP 03/18/2017 (Exact Date)  SpO2 98%  BMI 35.44 kg/m2 Estimated body mass index is 35.44 kg/(m^2) as calculated from the following:    Height as of 6/7/17: 5' 9\" (1.753 m).    Weight as of this encounter: 240 lb (108.9 kg).  BP completed using cuff size: fatuma Bynum MA      "

## 2017-11-29 ENCOUNTER — PRENATAL OFFICE VISIT (OUTPATIENT)
Dept: FAMILY MEDICINE | Facility: CLINIC | Age: 32
End: 2017-11-29
Payer: COMMERCIAL

## 2017-11-29 VITALS
WEIGHT: 239 LBS | OXYGEN SATURATION: 99 % | BODY MASS INDEX: 35.29 KG/M2 | DIASTOLIC BLOOD PRESSURE: 70 MMHG | SYSTOLIC BLOOD PRESSURE: 116 MMHG | RESPIRATION RATE: 18 BRPM | HEART RATE: 62 BPM | TEMPERATURE: 97.2 F

## 2017-11-29 DIAGNOSIS — Z34.80 ENCOUNTER FOR SUPERVISION OF OTHER NORMAL PREGNANCY, UNSPECIFIED TRIMESTER: Primary | ICD-10-CM

## 2017-11-29 LAB — HGB BLD-MCNC: 11.2 G/DL (ref 11.7–15.7)

## 2017-11-29 PROCEDURE — 87653 STREP B DNA AMP PROBE: CPT | Performed by: PHYSICIAN ASSISTANT

## 2017-11-29 PROCEDURE — 36415 COLL VENOUS BLD VENIPUNCTURE: CPT | Performed by: PHYSICIAN ASSISTANT

## 2017-11-29 PROCEDURE — 99207 ZZC PRENATAL VISIT: CPT | Performed by: PHYSICIAN ASSISTANT

## 2017-11-29 PROCEDURE — 00000218 ZZHCL STATISTIC OBHBG - HEMOGLOBIN: Performed by: PHYSICIAN ASSISTANT

## 2017-11-29 ASSESSMENT — PAIN SCALES - GENERAL: PAINLEVEL: NO PAIN (0)

## 2017-11-29 NOTE — NURSING NOTE
"Chief Complaint   Patient presents with     Prenatal Care       Initial /70  Pulse 62  Temp 97.2  F (36.2  C) (Tympanic)  Resp 18  Wt 239 lb (108.4 kg)  LMP 03/18/2017 (Exact Date)  SpO2 99%  BMI 35.29 kg/m2 Estimated body mass index is 35.29 kg/(m^2) as calculated from the following:    Height as of 6/7/17: 5' 9\" (1.753 m).    Weight as of this encounter: 239 lb (108.4 kg).  BP completed using cuff size: fatuma Bynum MA      "

## 2017-11-29 NOTE — MR AVS SNAPSHOT
After Visit Summary   11/29/2017    Leah Casarez    MRN: [id number]           Patient Information     Date Of Birth          1985        Visit Information        Provider Department      11/29/2017 3:45 PM Payal Messina PA-C [facility]        Today's Diagnoses     Encounter for supervision of other normal pregnancy, unspecified trimester    -  1       Follow-ups after your visit        Your next 10 appointments already scheduled     Dec 06, 2017  3:45 PM CST   [facility] Established P[facility]atal with Payal Messina PA-C   [facility]    [address] [place] [address]   [phone]            Dec 13, 2017  3:45 PM CST   [facility] Established Prenatal with Payal Messina PA-C   [facility]    [address] [place] [address]   [phone]            Dec 20, 2017  3:45 PM CST   [facility] Established Prenatal with Payal Messina PA-C   [facility]    [address] [place] [address]   [phone]              Future tests that were ordered for you today     Open Future Orders        Priority Expected Expires Ordered    US OB Limited One Or More Fetuses STAT  11/29/2018 11/29/2017            Who to contact     If you have questions or need follow up information about today's clinic visit or your schedule please contact [facility] directly at [phone].  Normal or non-critical lab and imaging results will be communicated to you by MyChart, letter or phone within 4 business days after the clinic has received the results. If you do not hear from us within 7 days, please contact the clinic through MyChart or phone. If you have a critical or abnormal lab result, we will notify you by phone as soon as possible.  Submit refill requests through Data[employer]iah[employer] or call your  pharmacy and they will forward the refill request to us. Please allow 3 business days for your refill to be completed.          Additional Information About Your Visit        MyChart Information     Vonagehart gives you secure access to your electronic health record. If you see a primary care provider, you can also send messages to your care team and make appointments. If you have questions, please call your primary care clinic.  If you do not have a primary care provider, please call [phone] and they will assist you.        Care EveryWhere ID     This is your Care EveryWhere ID. This could be used by other organizations to access your [place] medical records  [id number]        Your Vitals Were     Pulse Temperature Respirations Last Period Pulse Oximetry BMI (Body Mass Index)    62 97.2  F (36.2  C) (Tympanic) 18 03/18/2017 (Exact Date) 99% 35.29 kg/m2       Blood Pressure from Last 3 Encounters:   11/29/17 116/70   11/13/17 116/68   11/01/17 122/62    Weight from Last 3 Encounters:   11/29/17 239 lb (108.4 kg)   11/13/17 240 lb (108.9 kg)   11/01/17 237 lb (107.5 kg)              We Performed the Following     OB hemoglobin     Strep, Group B by PCR        Primary Care Provider Office Phone # Fax #    Payal Messina PA-C [phone] [address] [place] [address]        Equal Access to Services     TRAVIS HAMILTON : Hadii aad ku hadasho Soanders, waaxda luqadaha, qaybta kaalmada jovonyashae, maddy granados . So [facility] [phone].    ATENCIÓN: Si habla español, tiene a issa disposición servicios gratuitos de asistencia lingüística. Siknny al [phone].    We comply with applicable federal civil rights laws and [place] laws. We do not discriminate on the basis of race, color, national origin, age, disability, sex, sexual orientation, or gender identity.            Thank you!     Thank you for choosing [facility]  for your care. Our goal is  always to provide you with excellent care. Hearing back from our patients is one way we can continue to improve our services. Please take a few minutes to complete the written survey that you may receive in the mail after your visit with us. Thank you!             Your Updated Medication List - Protect others around you: Learn how to safely use, store and throw away your medicines at www.disposemymeds.org.          This list is accurate as of: 11/29/17 11:59 PM.  Always use your most recent med list.                   Brand Name Dispense Instructions for use Diagnosis    fluticasone 50 MCG/ACT spray    FLONASE    1 Bottle    Spray 2 sprays into both nostrils daily    Seasonal allergic rhinitis, unspecified chronicity, unspecified trigger       montelukast 10 MG tablet    SINGULAIR    90 tablet    Take 1 tablet (10 mg) by mouth At Bedtime    Seasonal allergic rhinitis, unspecified chronicity, unspecified trigger       PRENATAL VITAMIN PO

## 2017-11-30 ENCOUNTER — HOSPITAL ENCOUNTER (OUTPATIENT)
Dept: ULTRASOUND IMAGING | Facility: CLINIC | Age: 32
Discharge: HOME | End: 2017-11-30
Attending: PHYSICIAN ASSISTANT | Admitting: PHYSICIAN ASSISTANT
Payer: COMMERCIAL

## 2017-11-30 LAB
GP B STREP DNA SPEC QL NAA+PROBE: NEGATIVE
SPECIMEN SOURCE: NORMAL

## 2017-11-30 PROCEDURE — 76815 OB US LIMITED FETUS(S): CPT

## 2017-11-30 NOTE — PROGRESS NOTES
Doing well.  No concerns today. Continues to take PNV.  No vaginal bleeding, leakage, or contractions.  Baby's movement is active and frequent.  Discussed signs of labor and when to call or come in.   Discussed kick counts and fetal movement.  GBS was done today.  Serum labs including hemoglobin done today as well.  Please call if persistent HA, blurred vision, or swelling  She will report to OB if contractions every 5-10 minutes or if rupture of membranes.  Will get US due to poor interval growth and am concerned baby may be breech.  RTC in 1 week    Electronically signed by Payal Messina PA-C  11/30/2017

## 2017-12-06 ENCOUNTER — PRENATAL OFFICE VISIT (OUTPATIENT)
Dept: FAMILY MEDICINE | Facility: CLINIC | Age: 32
End: 2017-12-06
Payer: COMMERCIAL

## 2017-12-06 VITALS
SYSTOLIC BLOOD PRESSURE: 116 MMHG | BODY MASS INDEX: 35.74 KG/M2 | WEIGHT: 242 LBS | HEART RATE: 78 BPM | OXYGEN SATURATION: 100 % | RESPIRATION RATE: 18 BRPM | TEMPERATURE: 96 F | DIASTOLIC BLOOD PRESSURE: 70 MMHG

## 2017-12-06 DIAGNOSIS — Z34.80 ENCOUNTER FOR SUPERVISION OF OTHER NORMAL PREGNANCY, UNSPECIFIED TRIMESTER: Primary | ICD-10-CM

## 2017-12-06 PROCEDURE — 99207 ZZC PRENATAL VISIT: CPT | Performed by: PHYSICIAN ASSISTANT

## 2017-12-06 ASSESSMENT — PAIN SCALES - GENERAL: PAINLEVEL: NO PAIN (0)

## 2017-12-06 NOTE — MR AVS SNAPSHOT
After Visit Summary   12/6/2017    Leah Casarez    MRN: [id number]           Patient Information     Date Of Birth          1985        Visit Information        Provider Department      12/6/2017 3:45 PM Payal Messina PA-C [facility]        Today's Diagnoses     Encounter for supervision of other normal pregnancy, unspecified trimester    -  1       Follow-ups after your visit        Your next 10 appointments already scheduled     Dec 13, 2017  3:45 PM CST   [facility] Establ[facility] P[facility]atal with Payal Messina PA-C   [facility]    [address] [place] [address]   [phone]            Dec 20, 2017  3:45 PM CST   [facility] Establ[facility] P[facility]atal with Payal Messina PA-C   [facility]    [address] [place] [address] [phone]            Dec 26, 2017  2:45 PM CST   [facility] Establ[facility]atal with Payal Messina [facility]   [facility]    [address] [place] [address] [phone]              Who to contact     If you have questions or need follow up information about today's clinic visit or your schedule please contact [facility] directly at [phone].  Normal or non-critical lab and imaging results will be communicated to you by MyChart, letter or phone within 4 business days after the clinic has received the results. If you do not hear from us within 7 days, please contact the clinic through MyChart or phone. If you have a critical or abnormal lab result, we will notify you by phone as soon as possible.  Submit refill requests through [employer] or call your pharmacy and they will forward the refill request to us. Please allow 3 business days for your refill to be completed.          Additional Information About Your Visit        [facility]  Information     Datanyze gives you secure access to your electronic health record. If you see a primary care provider, you can also send messages to your care team and make appointments. If you have questions, please call your primary care clinic.  If you do not have a primary care provider, please call [phone] and they will assist you.        Care EveryWhere ID     This is your Care EveryWhere ID. This could be used by other organizations to access your [facility] medical records  [id number]        Your Vitals Were     Pulse Temperature Respirations Last Period Pulse Oximetry BMI (Body Mass Index)    78 96  F (35.6  C) (Tympanic) 18 03/18/2017 (Exact Date) 100% 35.74 kg/m2       Blood Pressure from Last 3 Encounters:   12/06/17 116/70   11/29/17 116/70   11/13/17 116/68    Weight from Last 3 Encounters:   12/06/17 242 lb (109.8 kg)   11/29/17 239 lb (108.4 kg)   11/13/17 240 lb (108.9 kg)              Today, you had the following     No orders found for display       Primary Care Provider Office Phone # Fax #    Payal Messina PA-C [phone] [address] [place] [address]        Equal Access to Services     TRAVIS HAMILTON AH: Hadii aad ku hadasho Soomaali, waaxda luqadaha, qaybta kaalmada adeegyada, waxay idiin haymichn jamilaeg anna lagideon ah. So [facility] [phone].    ATENCIÓN: Si habla español, tiene a issa disposición servicios gratuitos de asistencia lingüística. Llame al [phone].    We comply with applicable federal civil rights laws and [place] laws. We do not discriminate on the basis of race, color, national origin, age, disability, sex, sexual orientation, or gender identity.            Thank you!     Thank you for choosing [facility]  for your care. Our goal is always to provide you with excellent care. Hearing back from our patients is one way we can continue to improve our services. Please take a few minutes to complete the written survey that you may  receive in the mail after your visit with us. Thank you!             Your Updated Medication List - Protect others around you: Learn how to safely use, store and throw away your medicines at www.disposemymeds.org.          This list is accurate as of: 12/6/17 11:59 PM.  Always use your most recent med list.                   Brand Name Dispense Instructions for use Diagnosis    fluticasone 50 MCG/ACT spray    FLONASE    1 Bottle    Spray 2 sprays into both nostrils daily    Seasonal allergic rhinitis, unspecified chronicity, unspecified trigger       montelukast 10 MG tablet    SINGULAIR    90 tablet    Take 1 tablet (10 mg) by mouth At Bedtime    Seasonal allergic rhinitis, unspecified chronicity, unspecified trigger       PRENATAL VITAMIN PO

## 2017-12-06 NOTE — NURSING NOTE
"Chief Complaint   Patient presents with     Physical       Initial /70  Pulse 78  Temp 96  F (35.6  C) (Tympanic)  Resp 18  Wt 242 lb (109.8 kg)  LMP 03/18/2017 (Exact Date)  SpO2 100%  BMI 35.74 kg/m2 Estimated body mass index is 35.74 kg/(m^2) as calculated from the following:    Height as of 6/7/17: 5' 9\" (1.753 m).    Weight as of this encounter: 242 lb (109.8 kg).  BP completed using cuff size: estefani Bynum MA      "

## 2017-12-07 NOTE — PROGRESS NOTES
Doing well.  No concerns today. Continues to take PNV.  No vaginal bleeding, leakage, or contractions.  Baby's movement is active and frequent.  Discussed signs of labor and when to call or come in.   Discussed kick counts and fetal movement.  GBS returned  negative.  Please call if persistent HA, blurred vision, or swelling  She will report to OB if contractions every 5-10 minutes or if rupture of membranes.  RTC in 1 week    Electronically signed by Payal Messina PA-C  12/7/2017

## 2017-12-13 ENCOUNTER — PRENATAL OFFICE VISIT (OUTPATIENT)
Dept: FAMILY MEDICINE | Facility: CLINIC | Age: 32
End: 2017-12-13
Payer: COMMERCIAL

## 2017-12-13 VITALS
WEIGHT: 242 LBS | DIASTOLIC BLOOD PRESSURE: 72 MMHG | OXYGEN SATURATION: 100 % | HEART RATE: 88 BPM | TEMPERATURE: 97.3 F | RESPIRATION RATE: 18 BRPM | BODY MASS INDEX: 35.74 KG/M2 | SYSTOLIC BLOOD PRESSURE: 118 MMHG

## 2017-12-13 DIAGNOSIS — Z34.80 ENCOUNTER FOR SUPERVISION OF OTHER NORMAL PREGNANCY, UNSPECIFIED TRIMESTER: Primary | ICD-10-CM

## 2017-12-13 PROCEDURE — 99207 ZZC PRENATAL VISIT: CPT | Performed by: PHYSICIAN ASSISTANT

## 2017-12-13 ASSESSMENT — PAIN SCALES - GENERAL: PAINLEVEL: NO PAIN (0)

## 2017-12-13 NOTE — MR AVS SNAPSHOT
After Visit Summary   12/13/2017    Leah Casarez    MRN: [id number]           Patient Information     Date Of Birth          1985        Visit Information        Provider Department      12/13/2017 3:45 PM Payal Messina PA-C [facility]        Today's Diagnoses     Encounter for supervision of other normal pregnancy, unspecified trimester    -  1       Follow-ups after your visit        Your next 10 appointments already scheduled     Dec 20, 2017  3:45 PM CST   [facility] Established Prenatal with Payal Messina PA-C   [facility]    [address] [place] [id number] [phone]            Dec 26, 2017  2:45 PM CST   [facility] Established Prenatal with Payal Messina PA-C   [facility] ([facility]    [address] [place] [id number] [phone]              Who to contact     If you have questions or need follow up information about today's clinic visit or your schedule please contact [facility] directly at [phone].  Normal or non-critical lab and imaging results will be communicated to you by MyChart, letter or phone within 4 business days after the clinic has received the results. If you do not hear from us within 7 days, please contact the clinic through B-Side Entertainment[employer] or phone. If you have a critical or abnormal lab result, we will notify you by phone as soon as possible.  Submit refill requests through [employer] or call your pharmacy and they will forward the refill request to us. Please allow 3 business days for your refill to be completed.          Additional Information About Your Visit        MyChart Information     Olive Loom gives you secure access to your electronic health record. If you see a primary care provider, you can also send messages to your care team and make appointments. If you have questions, please call your  primary care clinic.  If you do not have a primary care provider, please call [phone] and they will assist you.        Care EveryWhere ID     This is your Care EveryWhere ID. This could be used by other organizations to access your [place] medical records  [id number]        Your Vitals Were     Pulse Temperature Respirations Last Period Pulse Oximetry BMI (Body Mass Index)    88 97.3  F (36.3  C) (Tympanic) 18 03/18/2017 (Exact Date) 100% 35.74 kg/m2       Blood Pressure from Last 3 Encounters:   12/13/17 118/72   12/06/17 116/70   11/29/17 116/70    Weight from Last 3 Encounters:   12/13/17 242 lb (109.8 kg)   12/06/17 242 lb (109.8 kg)   11/29/17 239 lb (108.4 kg)              Today, you had the following     No orders found for display       Primary Care Provider Office Phone # Fax #    Payal Messina PA-C [phone] [address] [place] [address]        Equal Access to Services     [facility]: Hadii aad ku hadasho Soomaali, waaxda luqadaha, qaybta kaalmada adeegyada, maddy granados . So [facility] [phone].    ATENCIÓN: Si habla español, tiene a issa disposición servicios gratuitos de asistencia lingüística. Llame al [phone].    We comply with applicable federal civil rights laws and [place] laws. We do not discriminate on the basis of race, color, national origin, age, disability, sex, sexual orientation, or gender identity.            Thank you!     Thank you for choosing [facility]  for your care. Our goal is always to provide you with excellent care. Hearing back from our patients is one way we can continue to improve our services. Please take a few minutes to complete the written survey that you may receive in the mail after your visit with us. Thank you!             Your Updated Medication List - Protect others around you: Learn how to safely use, store and throw away your medicines at www.disposemymeds.org.           This list is accurate as of: 12/13/17 11:59 PM.  Always use your most recent med list.                   Brand Name Dispense Instructions for use Diagnosis    fluticasone 50 MCG/ACT spray    FLONASE    1 Bottle    Spray 2 sprays into both nostrils daily    Seasonal allergic rhinitis, unspecified chronicity, unspecified trigger       montelukast 10 MG tablet    SINGULAIR    90 tablet    Take 1 tablet (10 mg) by mouth At Bedtime    Seasonal allergic rhinitis, unspecified chronicity, unspecified trigger       PRENATAL VITAMIN PO

## 2017-12-13 NOTE — NURSING NOTE
"Chief Complaint   Patient presents with     Prenatal Care       Initial /72  Pulse 88  Temp 97.3  F (36.3  C) (Tympanic)  Resp 18  Wt 242 lb (109.8 kg)  LMP 03/18/2017 (Exact Date)  SpO2 100%  BMI 35.74 kg/m2 Estimated body mass index is 35.74 kg/(m^2) as calculated from the following:    Height as of 6/7/17: 5' 9\" (1.753 m).    Weight as of this encounter: 242 lb (109.8 kg).  BP completed using cuff size: fatuma Bynum MA      "

## 2017-12-17 NOTE — PROGRESS NOTES
Doing well.  No concerns today. Continues to take PNV.  No vaginal bleeding, leakage, or contractions.  Baby's movement is active and frequent.  Discussed signs of labor and when to call or come in.   Discussed kick counts and fetal movement.  GBS returned  negative.  Please call if persistent HA, blurred vision, or swelling  She will report to OB if contractions every 5-10 minutes or if rupture of membranes.  RTC in 1 week    Will get US to check fetal position - she continues to measure small.    Electronically signed by Payal Messina PA-C  12/17/2017

## 2017-12-20 ENCOUNTER — PRENATAL OFFICE VISIT (OUTPATIENT)
Dept: FAMILY MEDICINE | Facility: CLINIC | Age: 32
End: 2017-12-20
Payer: COMMERCIAL

## 2017-12-20 VITALS
WEIGHT: 245 LBS | RESPIRATION RATE: 18 BRPM | HEART RATE: 82 BPM | SYSTOLIC BLOOD PRESSURE: 116 MMHG | TEMPERATURE: 97.6 F | DIASTOLIC BLOOD PRESSURE: 72 MMHG | BODY MASS INDEX: 36.18 KG/M2 | OXYGEN SATURATION: 100 %

## 2017-12-20 DIAGNOSIS — Z34.80 ENCOUNTER FOR SUPERVISION OF OTHER NORMAL PREGNANCY, UNSPECIFIED TRIMESTER: Primary | ICD-10-CM

## 2017-12-20 PROCEDURE — 99207 ZZC PRENATAL VISIT: CPT | Performed by: PHYSICIAN ASSISTANT

## 2017-12-20 ASSESSMENT — PAIN SCALES - GENERAL: PAINLEVEL: NO PAIN (0)

## 2017-12-20 NOTE — PROGRESS NOTES
Doing well.  No concerns today. Continues to take PNV.  No vaginal bleeding, leakage, but is noticing increasing irregular contractions.  Baby's movement is active and frequent.  Discussed signs of labor and when to call or come in.   Discussed kick counts and fetal movement.  Please call if persistent HA, blurred vision, or swelling  She will report to OB if contractions every 5-10 minutes or if rupture of membranes.  Cervix remains unreachable station -5.   RTC in 1 week    Electronically signed by Payal Messina PA-C  12/20/2017

## 2017-12-20 NOTE — NURSING NOTE
"Chief Complaint   Patient presents with     Prenatal Care       Initial /72  Pulse 82  Temp 97.6  F (36.4  C) (Tympanic)  Resp 18  Wt 245 lb (111.1 kg)  LMP 03/18/2017 (Exact Date)  SpO2 100%  BMI 36.18 kg/m2 Estimated body mass index is 36.18 kg/(m^2) as calculated from the following:    Height as of 6/7/17: 5' 9\" (1.753 m).    Weight as of this encounter: 245 lb (111.1 kg).  BP completed using cuff size: fatuma Bynum MA      "

## 2017-12-20 NOTE — MR AVS SNAPSHOT
After Visit Summary   12/20/2017    Leah Casarez    MRN: [id number]           Patient Information     Date Of Birth          1985        Visit Information        Provider Department      12/20/2017 3:45 PM Payal Messina PA-C [facility]        Today's Diagnoses     Encounter for supervision of other normal pregnancy, unspecified trimester    -  1       Follow-ups after your visit        Your next 10 appointments already scheduled     Dec 26, 2017  2:45 PM CST   [facility] Established Prenatal with Payal Messina PA-C   [facility] ([facility]    [address] [place] [id number] [phone]              Who to contact     If you have questions or need follow up information about today's clinic visit or your schedule please contact [facility] directly at [phone].  Normal or non-critical lab and imaging results will be communicated to you by MyChart, letter or phone within 4 business days after the clinic has received the results. If you do not hear from us within 7 days, please contact the clinic through Virtual Computerhart or phone. If you have a critical or abnormal lab result, we will notify you by phone as soon as possible.  Submit refill requests through [employer] or call your pharmacy and they will forward the refill request to us. Please allow 3 business days for your refill to be completed.          Additional Information About Your Visit        MyChart Information     [employer] gives you secure access to your electronic health record. If you see a primary care provider, you can also send messages to your care team and make appointments. If you have questions, please call your primary care clinic.  If you do not have a primary care provider, please call [phone] and they will assist you.        Care EveryWhere ID     This is your Care EveryWhere ID. This could be used by other organizations to access  your [place] medical records  [id number]        Your Vitals Were     Pulse Temperature Respirations Last Period Pulse Oximetry BMI (Body Mass Index)    82 97.6  F (36.4  C) (Tympanic) 18 03/18/2017 (Exact Date) 100% 36.18 kg/m2       Blood Pressure from Last 3 Encounters:   12/20/17 116/72   12/13/17 118/72   12/06/17 116/70    Weight from Last 3 Encounters:   12/20/17 245 lb (111.1 kg)   12/13/17 242 lb (109.8 kg)   12/06/17 242 lb (109.8 kg)              Today, you had the following     No orders found for display       Primary Care Provider Office Phone # Fax #    Payal Messina PA-C [phone] [address] [place] [address]        Equal Access to Services     [facility]: Hadii krysta crain hadasho Soomaali, waaxda luqadaha, qaybta kaalmada adeegyada, maddy yu hayjoyce granados . So [facility] [phone].    ATENCIÓN: Si habla español, tiene a issa disposición servicios gratuitos de asistencia lingüística. Llame al [phone].    We comply with applicable federal civil rights laws and [place] laws. We do not discriminate on the basis of race, color, national origin, age, disability, sex, sexual orientation, or gender identity.            Thank you!     Thank you for choosing [facility]  for your care. Our goal is always to provide you with excellent care. Hearing back from our patients is one way we can continue to improve our services. Please take a few minutes to complete the written survey that you may receive in the mail after your visit with us. Thank you!             Your Updated Medication List - Protect others around you: Learn how to safely use, store and throw away your medicines at www.disposemymeds.org.          This list is accurate as of: 12/20/17  4:21 PM.  Always use your most recent med list.                   Brand Name Dispense Instructions for use Diagnosis    fluticasone 50 MCG/ACT spray    FLONASE    1 Bottle    Spray 2 sprays into  both nostrils daily    Seasonal allergic rhinitis, unspecified chronicity, unspecified trigger       montelukast 10 MG tablet    SINGULAIR    90 tablet    Take 1 tablet (10 mg) by mouth At Bedtime    Seasonal allergic rhinitis, unspecified chronicity, unspecified trigger       PRENATAL VITAMIN PO

## 2017-12-25 ENCOUNTER — TELEPHONE (OUTPATIENT)
Dept: OBGYN | Facility: CLINIC | Age: 32
End: 2017-12-25

## 2017-12-25 NOTE — TELEPHONE ENCOUNTER
40 2/7 [place]  Started having pain in hip/sides last evening, comes and goes but pt states does not seem like ctx. Will last a few minutes and then go away for a while. Less uncomfortable this motning, but still happens occasionally. Baby active

## 2017-12-25 NOTE — PATIENT INSTRUCTIONS
Try tylenol for discomfort, or tub bath. Call back if becomes more uncomfortable again, or just come in. Pt agrees with plan, and does have appt tomorrow

## 2017-12-26 ENCOUNTER — PRENATAL OFFICE VISIT (OUTPATIENT)
Dept: FAMILY MEDICINE | Facility: CLINIC | Age: 32
End: 2017-12-26
Payer: COMMERCIAL

## 2017-12-26 VITALS
RESPIRATION RATE: 18 BRPM | WEIGHT: 244 LBS | BODY MASS INDEX: 36.03 KG/M2 | TEMPERATURE: 97.8 F | SYSTOLIC BLOOD PRESSURE: 128 MMHG | DIASTOLIC BLOOD PRESSURE: 88 MMHG | OXYGEN SATURATION: 98 % | HEART RATE: 82 BPM

## 2017-12-26 DIAGNOSIS — Z34.80 ENCOUNTER FOR SUPERVISION OF OTHER NORMAL PREGNANCY, UNSPECIFIED TRIMESTER: Primary | ICD-10-CM

## 2017-12-26 PROCEDURE — 99207 ZZC PRENATAL VISIT: CPT | Performed by: PHYSICIAN ASSISTANT

## 2017-12-26 ASSESSMENT — PAIN SCALES - GENERAL: PAINLEVEL: MODERATE PAIN (4)

## 2017-12-26 NOTE — MR AVS SNAPSHOT
After Visit Summary   12/26/2017    Leah Casarez    MRN: [id number]           Patient Information     Date Of Birth          1985        Visit Information        Provider Department      12/26/2017 2:45 PM Payal Messina PA-C [facility]        Today's Diagnoses     Encounter for supervision of other normal pregnancy, unspecified trimester    -  1       Follow-ups after your visit        Who to contact     If you have questions or need follow up information about today's clinic visit or your schedule please contact [facility] directly at [phone].  Normal or non-critical lab and imaging results will be communicated to you by SeeFuturehart, letter or phone within 4 business days after the clinic has received the results. If you do not hear from us within 7 days, please contact the clinic through [email or web address] or phone. If you have a critical or abnormal lab result, we will notify you by phone as soon as possible.  Submit refill requests through [email or web address] or call your pharmacy and they will forward the refill request to us. Please allow 3 business days for your refill to be completed.          Additional Information About Your Visit        MyChart Information     [email or web address] gives you secure access to your electronic health record. If you see a primary care provider, you can also send messages to your care team and make appointments. If you have questions, please call your primary care clinic.  If you do not have a primary care provider, please call [phone] and they will assist you.        Care EveryWhere ID     This is your Care EveryWhere ID. This could be used by other organizations to access your [facility] medical records  [id number]        Your Vitals Were     Pulse Temperature Respirations Last Period Pulse Oximetry BMI (Body Mass Index)    82 97.8  F (36.6  C) (Tympanic) 18 03/18/2017 (Exact Date) 98% 36.03 kg/m2       Blood Pressure from Last 3 Encounters:    12/26/17 128/88   12/20/17 116/72   12/13/17 118/72    Weight from Last 3 Encounters:   12/26/17 244 lb (110.7 kg)   12/20/17 245 lb (111.1 kg)   12/13/17 242 lb (109.8 kg)              Today, you had the following     No orders found for display       Primary Care Provider Office Phone # Fax #    Payal Messina PA-C [phone]       [address] [place] [address]        Equal Access to Services     [facility]: Hadii aad ku hadasho Soomaali, waaxda luqadaha, qaybta kaalmada adeegyada, waxay reynain hayjoyce granados . So [facility] [phone].    ATENCIÓN: Si habla español, tiene a issa disposición servicios gratuitos de asistencia lingüística. LlJ.W[facility] [phone].    We comply with applicable federal civil rights laws and [place] laws. We do not discriminate on the basis of race, color, national origin, age, disability, sex, sexual orientation, or gender identity.            Thank you!     Thank you for choosing [facility]  for your care. Our goal is always to provide you with excellent care. Hearing back from our patients is one way we can continue to improve our services. Please take a few minutes to complete the written survey that you may receive in the mail after your visit with us. Thank you!             Your Updated Medication List - Protect others around you: Learn how to safely use, store and throw away your medicines at www.disposemymeds.org.          This list is accurate as of: 12/26/17  3:18 PM.  Always use your most recent med list.                   Brand Name Dispense Instructions for use Diagnosis    fluticasone 50 MCG/ACT spray    FLONASE    1 Bottle    Spray 2 sprays into both nostrils daily    Seasonal allergic rhinitis, unspecified chronicity, unspecified trigger       montelukast 10 MG tablet    SINGULAIR    90 tablet    Take 1 tablet (10 mg) by mouth At Bedtime    Seasonal allergic rhinitis, unspecified chronicity, unspecified trigger        PRENATAL VITAMIN PO

## 2017-12-26 NOTE — PROGRESS NOTES
Doing well.  No concerns today. Continues to take PNV.  No vaginal bleeding, leakage, but has had some irregular contractions- in fact called the birth place this weekend.  Baby's movement is active and frequent.  Discussed signs of labor and when to call or come in.   Discussed kick counts and fetal movement.    Please call if persistent HA, blurred vision, or swelling  She will report to OB if contractions every 5-10 minutes or if rupture of membranes.  RTC - will set her up with Dr. Campos for Thursday - he will need to arrange a 41 week induction.     Electronically signed by Payal Messina PA-C  12/26/2017

## 2017-12-26 NOTE — NURSING NOTE
"Chief Complaint   Patient presents with     Prenatal Care       Initial /88  Pulse 82  Temp 97.8  F (36.6  C) (Tympanic)  Resp 18  Wt 244 lb (110.7 kg)  LMP 03/18/2017 (Exact Date)  SpO2 98%  BMI 36.03 kg/m2 Estimated body mass index is 36.03 kg/(m^2) as calculated from the following:    Height as of 6/7/17: 5' 9\" (1.753 m).    Weight as of this encounter: 244 lb (110.7 kg).  BP completed using cuff size: fatuma Bynum MA      "

## 2017-12-27 ENCOUNTER — HOSPITAL ENCOUNTER (INPATIENT)
Facility: CLINIC | Age: 32
LOS: 2 days | Discharge: HOME | End: 2017-12-29
Attending: FAMILY MEDICINE | Admitting: FAMILY MEDICINE
Payer: COMMERCIAL

## 2017-12-27 ENCOUNTER — ANESTHESIA EVENT (OUTPATIENT)
Dept: OBGYN | Facility: CLINIC | Age: 32
End: 2017-12-27
Payer: COMMERCIAL

## 2017-12-27 ENCOUNTER — ANESTHESIA (OUTPATIENT)
Dept: OBGYN | Facility: CLINIC | Age: 32
End: 2017-12-27
Payer: COMMERCIAL

## 2017-12-27 LAB
ABO + RH BLD: NORMAL
ABO + RH BLD: NORMAL
SPECIMEN EXP DATE BLD: NORMAL

## 2017-12-27 PROCEDURE — 59400 OBSTETRICAL CARE: CPT | Performed by: FAMILY MEDICINE

## 2017-12-27 PROCEDURE — 37000011 ZZH ANESTHESIA WARD SERVICE: Performed by: NURSE ANESTHETIST, CERTIFIED REGISTERED

## 2017-12-27 PROCEDURE — 72200001 ZZH LABOR CARE VAGINAL DELIVERY SINGLE

## 2017-12-27 PROCEDURE — 3E0R3BZ INTRODUCTION OF ANESTHETIC AGENT INTO SPINAL CANAL, PERCUTANEOUS APPROACH: ICD-10-PCS | Performed by: FAMILY MEDICINE

## 2017-12-27 PROCEDURE — 40000671 ZZH STATISTIC ANESTHESIA CASE

## 2017-12-27 PROCEDURE — 00HU33Z INSERTION OF INFUSION DEVICE INTO SPINAL CANAL, PERCUTANEOUS APPROACH: ICD-10-PCS | Performed by: FAMILY MEDICINE

## 2017-12-27 PROCEDURE — 25000125 ZZHC RX 250: Performed by: NURSE ANESTHETIST, CERTIFIED REGISTERED

## 2017-12-27 PROCEDURE — 86900 BLOOD TYPING SEROLOGIC ABO: CPT | Performed by: FAMILY MEDICINE

## 2017-12-27 PROCEDURE — 12000029 ZZH R&B OB INTERMEDIATE

## 2017-12-27 PROCEDURE — 0KQM0ZZ REPAIR PERINEUM MUSCLE, OPEN APPROACH: ICD-10-PCS | Performed by: FAMILY MEDICINE

## 2017-12-27 PROCEDURE — 86901 BLOOD TYPING SEROLOGIC RH(D): CPT | Performed by: FAMILY MEDICINE

## 2017-12-27 PROCEDURE — 86780 TREPONEMA PALLIDUM: CPT | Performed by: FAMILY MEDICINE

## 2017-12-27 PROCEDURE — 25000128 H RX IP 250 OP 636: Performed by: NURSE ANESTHETIST, CERTIFIED REGISTERED

## 2017-12-27 PROCEDURE — 25000128 H RX IP 250 OP 636: Performed by: FAMILY MEDICINE

## 2017-12-27 RX ORDER — OXYTOCIN/0.9 % SODIUM CHLORIDE 30/500 ML
100 PLASTIC BAG, INJECTION (ML) INTRAVENOUS CONTINUOUS
Status: DISCONTINUED | OUTPATIENT
Start: 2017-12-27 | End: 2017-12-29 | Stop reason: HOSPADM

## 2017-12-27 RX ORDER — LANOLIN 100 %
OINTMENT (GRAM) TOPICAL
Status: DISCONTINUED | OUTPATIENT
Start: 2017-12-27 | End: 2017-12-29 | Stop reason: HOSPADM

## 2017-12-27 RX ORDER — ACETAMINOPHEN 325 MG/1
650 TABLET ORAL EVERY 4 HOURS PRN
Status: DISCONTINUED | OUTPATIENT
Start: 2017-12-27 | End: 2017-12-29 | Stop reason: HOSPADM

## 2017-12-27 RX ORDER — IBUPROFEN 800 MG/1
800 TABLET, FILM COATED ORAL EVERY 6 HOURS PRN
Status: DISCONTINUED | OUTPATIENT
Start: 2017-12-27 | End: 2017-12-29 | Stop reason: HOSPADM

## 2017-12-27 RX ORDER — IBUPROFEN 800 MG/1
800 TABLET, FILM COATED ORAL
Status: DISCONTINUED | OUTPATIENT
Start: 2017-12-27 | End: 2017-12-27

## 2017-12-27 RX ORDER — MISOPROSTOL 200 UG/1
400 TABLET ORAL
Status: DISCONTINUED | OUTPATIENT
Start: 2017-12-27 | End: 2017-12-29 | Stop reason: HOSPADM

## 2017-12-27 RX ORDER — METHYLERGONOVINE MALEATE 0.2 MG/ML
200 INJECTION INTRAVENOUS
Status: DISCONTINUED | OUTPATIENT
Start: 2017-12-27 | End: 2017-12-27

## 2017-12-27 RX ORDER — OXYTOCIN 10 [USP'U]/ML
10 INJECTION, SOLUTION INTRAMUSCULAR; INTRAVENOUS
Status: DISCONTINUED | OUTPATIENT
Start: 2017-12-27 | End: 2017-12-29 | Stop reason: HOSPADM

## 2017-12-27 RX ORDER — LIDOCAINE HYDROCHLORIDE AND EPINEPHRINE 15; 5 MG/ML; UG/ML
INJECTION, SOLUTION EPIDURAL PRN
Status: DISCONTINUED | OUTPATIENT
Start: 2017-12-27 | End: 2017-12-28

## 2017-12-27 RX ORDER — SODIUM CHLORIDE, SODIUM LACTATE, POTASSIUM CHLORIDE, CALCIUM CHLORIDE 600; 310; 30; 20 MG/100ML; MG/100ML; MG/100ML; MG/100ML
INJECTION, SOLUTION INTRAVENOUS CONTINUOUS
Status: DISCONTINUED | OUTPATIENT
Start: 2017-12-27 | End: 2017-12-27

## 2017-12-27 RX ORDER — ACETAMINOPHEN 325 MG/1
650 TABLET ORAL EVERY 4 HOURS PRN
Status: DISCONTINUED | OUTPATIENT
Start: 2017-12-27 | End: 2017-12-27

## 2017-12-27 RX ORDER — OXYCODONE HYDROCHLORIDE 5 MG/1
5 TABLET ORAL EVERY 4 HOURS PRN
Status: DISCONTINUED | OUTPATIENT
Start: 2017-12-27 | End: 2017-12-29 | Stop reason: HOSPADM

## 2017-12-27 RX ORDER — BISACODYL 10 MG
10 SUPPOSITORY, RECTAL RECTAL DAILY PRN
Status: DISCONTINUED | OUTPATIENT
Start: 2017-12-29 | End: 2017-12-29 | Stop reason: HOSPADM

## 2017-12-27 RX ORDER — OXYTOCIN 10 [USP'U]/ML
10 INJECTION, SOLUTION INTRAMUSCULAR; INTRAVENOUS
Status: COMPLETED | OUTPATIENT
Start: 2017-12-27 | End: 2017-12-27

## 2017-12-27 RX ORDER — ONDANSETRON 2 MG/ML
4 INJECTION INTRAMUSCULAR; INTRAVENOUS EVERY 6 HOURS PRN
Status: DISCONTINUED | OUTPATIENT
Start: 2017-12-27 | End: 2017-12-27

## 2017-12-27 RX ORDER — FENTANYL CITRATE 50 UG/ML
50-100 INJECTION, SOLUTION INTRAMUSCULAR; INTRAVENOUS
Status: DISCONTINUED | OUTPATIENT
Start: 2017-12-27 | End: 2017-12-27

## 2017-12-27 RX ORDER — BUPIVACAINE HYDROCHLORIDE 2.5 MG/ML
INJECTION, SOLUTION INFILTRATION; PERINEURAL PRN
Status: DISCONTINUED | OUTPATIENT
Start: 2017-12-27 | End: 2017-12-28

## 2017-12-27 RX ORDER — OXYTOCIN/0.9 % SODIUM CHLORIDE 30/500 ML
100-340 PLASTIC BAG, INJECTION (ML) INTRAVENOUS CONTINUOUS PRN
Status: DISCONTINUED | OUTPATIENT
Start: 2017-12-27 | End: 2017-12-27

## 2017-12-27 RX ORDER — OXYTOCIN/0.9 % SODIUM CHLORIDE 30/500 ML
340 PLASTIC BAG, INJECTION (ML) INTRAVENOUS CONTINUOUS PRN
Status: DISCONTINUED | OUTPATIENT
Start: 2017-12-27 | End: 2017-12-29 | Stop reason: HOSPADM

## 2017-12-27 RX ORDER — NALOXONE HYDROCHLORIDE 0.4 MG/ML
.1-.4 INJECTION, SOLUTION INTRAMUSCULAR; INTRAVENOUS; SUBCUTANEOUS
Status: DISCONTINUED | OUTPATIENT
Start: 2017-12-27 | End: 2017-12-29 | Stop reason: HOSPADM

## 2017-12-27 RX ORDER — NALOXONE HYDROCHLORIDE 0.4 MG/ML
.1-.4 INJECTION, SOLUTION INTRAMUSCULAR; INTRAVENOUS; SUBCUTANEOUS
Status: DISCONTINUED | OUTPATIENT
Start: 2017-12-27 | End: 2017-12-27

## 2017-12-27 RX ORDER — CARBOPROST TROMETHAMINE 250 UG/ML
250 INJECTION, SOLUTION INTRAMUSCULAR
Status: DISCONTINUED | OUTPATIENT
Start: 2017-12-27 | End: 2017-12-27

## 2017-12-27 RX ORDER — LIDOCAINE 40 MG/G
CREAM TOPICAL
Status: DISCONTINUED | OUTPATIENT
Start: 2017-12-27 | End: 2017-12-27

## 2017-12-27 RX ORDER — AMOXICILLIN 250 MG
1 CAPSULE ORAL 2 TIMES DAILY PRN
Status: DISCONTINUED | OUTPATIENT
Start: 2017-12-27 | End: 2017-12-29 | Stop reason: HOSPADM

## 2017-12-27 RX ORDER — OXYCODONE AND ACETAMINOPHEN 5; 325 MG/1; MG/1
1 TABLET ORAL
Status: DISCONTINUED | OUTPATIENT
Start: 2017-12-27 | End: 2017-12-27

## 2017-12-27 RX ORDER — HYDROCORTISONE 2.5 %
CREAM (GRAM) TOPICAL 3 TIMES DAILY PRN
Status: DISCONTINUED | OUTPATIENT
Start: 2017-12-27 | End: 2017-12-29 | Stop reason: HOSPADM

## 2017-12-27 RX ORDER — NALBUPHINE HYDROCHLORIDE 10 MG/ML
2.5-5 INJECTION, SOLUTION INTRAMUSCULAR; INTRAVENOUS; SUBCUTANEOUS EVERY 6 HOURS PRN
Status: DISCONTINUED | OUTPATIENT
Start: 2017-12-27 | End: 2017-12-27

## 2017-12-27 RX ORDER — AMOXICILLIN 250 MG
2 CAPSULE ORAL 2 TIMES DAILY PRN
Status: DISCONTINUED | OUTPATIENT
Start: 2017-12-27 | End: 2017-12-29 | Stop reason: HOSPADM

## 2017-12-27 RX ORDER — EPHEDRINE SULFATE 50 MG/ML
5 INJECTION, SOLUTION INTRAMUSCULAR; INTRAVENOUS; SUBCUTANEOUS
Status: DISCONTINUED | OUTPATIENT
Start: 2017-12-27 | End: 2017-12-27

## 2017-12-27 RX ADMIN — SODIUM CHLORIDE, POTASSIUM CHLORIDE, SODIUM LACTATE AND CALCIUM CHLORIDE: 600; 310; 30; 20 INJECTION, SOLUTION INTRAVENOUS at 22:00

## 2017-12-27 RX ADMIN — LIDOCAINE HYDROCHLORIDE,EPINEPHRINE BITARTRATE 3 ML: 15; .005 INJECTION, SOLUTION EPIDURAL; INFILTRATION; INTRACAUDAL; PERINEURAL at 21:07

## 2017-12-27 RX ADMIN — SODIUM CHLORIDE, POTASSIUM CHLORIDE, SODIUM LACTATE AND CALCIUM CHLORIDE: 600; 310; 30; 20 INJECTION, SOLUTION INTRAVENOUS at 22:03

## 2017-12-27 RX ADMIN — SODIUM CHLORIDE, POTASSIUM CHLORIDE, SODIUM LACTATE AND CALCIUM CHLORIDE 1000 ML: 600; 310; 30; 20 INJECTION, SOLUTION INTRAVENOUS at 20:52

## 2017-12-27 RX ADMIN — Medication 10 ML/HR: at 21:18

## 2017-12-27 RX ADMIN — ONDANSETRON HYDROCHLORIDE 4 MG: 2 INJECTION, SOLUTION INTRAMUSCULAR; INTRAVENOUS at 22:19

## 2017-12-27 RX ADMIN — BUPIVACAINE HYDROCHLORIDE 5 ML: 2.5 INJECTION, SOLUTION EPIDURAL; INFILTRATION; INTRACAUDAL; PERINEURAL at 21:10

## 2017-12-27 RX ADMIN — FENTANYL CITRATE 100 MCG: 50 INJECTION, SOLUTION INTRAMUSCULAR; INTRAVENOUS at 20:52

## 2017-12-27 RX ADMIN — OXYTOCIN 10 UNITS: 10 INJECTION, SOLUTION INTRAMUSCULAR; INTRAVENOUS at 22:49

## 2017-12-27 RX ADMIN — Medication 10 ML/HR: at 21:17

## 2017-12-27 RX ADMIN — BUPIVACAINE HYDROCHLORIDE 5 ML: 2.5 INJECTION, SOLUTION EPIDURAL; INFILTRATION; INTRACAUDAL; PERINEURAL at 21:13

## 2017-12-27 NOTE — IP AVS SNAPSHOT
MRN:[id number]                      After Visit Summary   12/27/2017    Leah Casarez    MRN: [id number]           Thank you!     Thank you for choosing [facility] for your care. Our goal is always to provide you with excellent care. Hearing back from our patients is one way we can continue to improve our services. Please take a few minutes to complete the written survey that you may receive in the mail after you visit with us. Thank you!        Patient Information     Date Of Birth          1985        About your hospital stay     You were admitted on:  December 27, 2017 You last received care in the:  [facility]    You were discharged on:  December 29, 2017       Who to Call     For medical emergencies, please call 911.  For non-urgent questions about your medical care, please call your primary care provider or clinic, [phone]          Attending Provider     Provider Specialty    Dwayne Campos MD Family Practice       Primary Care Provider Office Phone # Fax #    Payal Messina PA-C [phone]      Further instructions from your care team       [facility] Discharge Instructions     Discharge disposition:  Discharged to home       Diet:  Regular       Activity No lifting more than 20# x 2 wks then increase gradually by 10# per week.  No driving or operating machinery while on narcotic analgesics  Pelvic rest: abstain from intercourse and do not use tampons for 6 week(s)        Follow-up: Follow up with primary care provider in 6 weeks       Additional instructions: Glory-care per nursing staff        Postpartum Vaginal Delivery Instructions    Activity       Ask family and friends for help when you need it.    Do not place anything in your vagina for 6 weeks.    You are not restricted on other activities, but take it easy for a few weeks to allow your body to recover from delivery.  You are able to do any activities  you feel up to that point.    No driving until you have stopped taking your pain medications (usually two weeks after delivery).     Call your health care provider if you have any of these symptoms:       Increased pain, swelling, redness, or fluid around your stiches from an episiotomy or perineal tear.    A fever above 100.4 F (38 C) with or without chills when placing a thermometer under your tongue.    You soak a sanitary pad with blood within 1 hour, or you see blood clots larger than a golf ball.    Bleeding that lasts more than 6 weeks.    Vaginal discharge that smells bad.    Severe pain, cramping or tenderness in your lower belly area.    A need to urinate more frequently (use the toilet more often), more urgently (use the toilet very quickly), or it burns when you urinate.    Nausea and vomiting.    Redness, swelling or pain around a vein in your leg.    Problems breastfeeding or a red or painful area on your breast.    Chest pain and cough or are gasping for air.    Problems coping with sadness, anxiety, or depression.  If you have any concerns about hurting yourself or the baby, call your provider immediately.     You have questions or concerns after you return home.     Keep your hands clean:  Always wash your hands before touching your perineal area and stitches.  This helps reduce your risk of infection.  If your hands aren't dirty, you may use an alcohol hand-rub to clean your hands. Keep your nails clean and short.        Pending Results     No orders found from 12/25/2017 to 12/28/2017.            Statement of Approval     Ordered          12/29/17 1059  I have reviewed and agree with all the recommendations and orders detailed in this document.  EFFECTIVE NOW     Approved and electronically signed by:  Dwayne Campos MD           12/28/17 7099  I have reviewed and agree with all the recommendations and orders detailed in this document.  EFFECTIVE NOW     Approved and electronically signed by:   Dwayne Campos MD             Admission Information     Date & Time Provider Department Dept. Phone    12/27/2017 Dwayne Campos MD [facility] [phone]      Your Vitals Were     Blood Pressure Pulse Temperature Respirations Last Period Pulse Oximetry    106/53 61 97.3  F (36.3  C) (Oral) 16 03/18/2017 (Exact Date) 97%      MyChart Information     [facility] gives you secure access to your electronic health record. If you see a primary care provider, you can also send messages to your care team and make appointments. If you have questions, please call your primary care clinic.  If you do not have a primary care provider, please call [phone] and they will assist you.        Care EveryWhere ID     This is your Care EveryWhere ID. This could be used by other organizations to access your [facility] medical records  [id number]        Equal Access to Services     [facility]IS [facility]IL[facility] : Maia Gusman, giacomo lowe, beba stevenson, maddy granados . So [facility] [place] [phone].    ATENCIÓN: Si habla español, tiene a issa disposición servicios gratuitos de asistencia lingüística. Llame al [phone].    We comply with applicable federal civil rights laws and [place] laws. We do not discriminate on the basis of race, color, national origin, age, disability, sex, sexual orientation, or gender identity.               Review of your medicines      START taking        Dose / Directions    ibuprofen 800 MG tablet   Commonly known as:  ADVIL/MOTRIN        Dose:  800 mg   Take 1 tablet (800 mg) by mouth every 6 hours as needed for other (cramping)   Quantity:  90 tablet   Refills:  1       lanolin ointment   Used for:  Breast feeding status of mother        Apply topically every hour as needed for dry skin (sore nipples)   Quantity:  30 g   Refills:  1         CONTINUE these medicines which have NOT CHANGED        Dose / Directions    fluticasone 50 MCG/ACT  spray   Commonly known as:  FLONASE   Used for:  Seasonal allergic rhinitis, unspecified chronicity, unspecified trigger        Dose:  2 spray   Spray 2 sprays into both nostrils daily   Quantity:  1 Bottle   Refills:  11       montelukast 10 MG tablet   Commonly known as:  SINGULAIR   Used for:  Seasonal allergic rhinitis, unspecified chronicity, unspecified trigger        Dose:  10 mg   Take 1 tablet (10 mg) by mouth At Bedtime   Quantity:  90 tablet   Refills:  1       PRENATAL VITAMIN PO   Indication:  Gummy - take up to 6 gummies per day        Dose:  1 tablet   Take 1 tablet by mouth daily   Refills:  0            Where to get your medicines      These medications were sent to [facility] [place], [place] - [address]   [address] , [place] [address]     Phone:  [phone]     ibuprofen 800 MG tablet    lanolin ointment                Protect others around you: Learn how to safely use, store and throw away your medicines at www.disposemymeds.org.             Medication List: This is a list of all your medications and when to take them. Check marks below indicate your daily home schedule. Keep this list as a reference.      Medications           Morning Afternoon Evening Bedtime As Needed    fluticasone 50 MCG/ACT spray   Commonly known as:  FLONASE   Spray 2 sprays into both nostrils daily                                ibuprofen 800 MG tablet   Commonly known as:  ADVIL/MOTRIN   Take 1 tablet (800 mg) by mouth every 6 hours as needed for other (cramping)   Last time this was given:  800 mg on 12/28/2017  9:22 PM                                lanolin ointment   Apply topically every hour as needed for dry skin (sore nipples)   Last time this was given:  12/28/2017  1:22 AM                                montelukast 10 MG tablet   Commonly known as:  SINGULAIR   Take 1 tablet (10 mg) by mouth At Bedtime                                PRENATAL VITAMIN PO   Take 1 tablet by mouth  daily

## 2017-12-27 NOTE — IP AVS SNAPSHOT
[facility]    [address] [place] [address]    Phone:  [phone]                                       After Visit Summary   12/27/2017    Leah Casarez    MRN: [id number]           After Visit Summary Signature Page     I have received my discharge instructions, and my questions have been answered. I have discussed any challenges I see with this plan with the nurse or doctor.    ..........................................................................................................................................  Patient/Patient Representative Signature      ..........................................................................................................................................  Patient Representative Print Name and Relationship to Patient    ..................................................               ................................................  Date                                            Time    ..........................................................................................................................................  Reviewed by Signature/Title    ...................................................              ..............................................  Date                                                            Time

## 2017-12-28 LAB
HGB BLD-MCNC: 11 G/DL (ref 11.7–15.7)
T PALLIDUM IGG+IGM SER QL: NEGATIVE

## 2017-12-28 PROCEDURE — 36415 COLL VENOUS BLD VENIPUNCTURE: CPT | Performed by: FAMILY MEDICINE

## 2017-12-28 PROCEDURE — 85018 HEMOGLOBIN: CPT | Performed by: FAMILY MEDICINE

## 2017-12-28 PROCEDURE — 12000029 ZZH R&B OB INTERMEDIATE

## 2017-12-28 PROCEDURE — 25000132 ZZH RX MED GY IP 250 OP 250 PS 637: Performed by: FAMILY MEDICINE

## 2017-12-28 RX ADMIN — IBUPROFEN 800 MG: 800 TABLET ORAL at 21:22

## 2017-12-28 RX ADMIN — IBUPROFEN 800 MG: 800 TABLET ORAL at 09:00

## 2017-12-28 RX ADMIN — IBUPROFEN 800 MG: 800 TABLET ORAL at 01:22

## 2017-12-28 RX ADMIN — Medication: at 01:22

## 2017-12-28 RX ADMIN — SENNOSIDES AND DOCUSATE SODIUM 2 TABLET: 8.6; 5 TABLET ORAL at 21:22

## 2017-12-28 RX ADMIN — IBUPROFEN 800 MG: 800 TABLET ORAL at 14:57

## 2017-12-28 NOTE — PLAN OF CARE
Problem: Postpartum (Vaginal Delivery) (Adult,Obstetrics,Pediatric)  Goal: Signs and Symptoms of Listed Potential Problems Will be Absent, Minimized or Managed (Postpartum)  Signs and symptoms of listed potential problems will be absent, minimized or managed by discharge/transition of care (reference Postpartum (Vaginal Delivery) (Adult,Obstetrics,Pediatric) CPG).   Outcome: Improving  Has been up several times without any problems. Takes po Ibuprofen with relief from cramping and back pain. Encouraged to sit in the tub this am. Gave a gluten free menu to order from. Will have lactation see today

## 2017-12-28 NOTE — PROGRESS NOTES
S: Transfer to postpartum  B: Vaginal birth @ 2244, first degree tear, with repair, breast feeding    A: Mother and baby transferred to postpartum unit at 0150 via wheel chair after completion of immediate recovery period. Patient oriented to room. Mother and baby bonding well and in satisfactory condition upon transfer.  R: Anticipate routine postpartum care.

## 2017-12-28 NOTE — H&P
[facility] Labor and Delivery History and Physical    Leah Casarez MRN# [id number]   Age: 32 year old YOB: 1985     Date of Admission:  2017    Primary care provider: Payal Messina           Chief Complaint:   Leah Casarez is a 32 year old female who is 40w4d pregnant and being admitted for active labor management.          Pregnancy history:     OBSTETRIC HISTORY:    Obstetric History       T1      L1     SAB0   TAB0   Ectopic0   Multiple0   Live Births1       # Outcome Date GA Lbr Marlo/2nd Weight Sex Delivery Anes PTL Lv   2 Current            1 Term 05/08/15 40w0d  3.317 kg (7 lb 5 oz) M   N       Name: Tabby      Obstetric Comments   JACKELIN:5/7/15 by 8 4/7 week ultrasound    to Demetrio.  This will be their first delivery at [facility] [place].       EDC: Estimated Date of Delivery: 17    Prenatal Labs:   Lab Results   Component Value Date    ABO A 2017    RH  Pos 2017    AS Neg 2017    HEPBANG Nonreactive 2017    CHPCRT  2017     Negative   Negative for C. trachomatis rRNA by transcription mediated amplification.   A negative result by transcription mediated amplification does not preclude the   presence of C. trachomatis infection because results are dependent on proper   and adequate collection, absence of inhibitors, and sufficient rRNA to be   detected.      GCPCRT  2017     Negative   Negative for N. gonorrhoeae rRNA by transcription mediated amplification.   A negative result by transcription mediated amplification does not preclude the   presence of N. gonorrhoeae infection because results are dependent on proper   and adequate collection, absence of inhibitors, and sufficient rRNA to be   detected.      TREPAB Negative 10/02/2017    HGB 11.2 (L) 2017       GBS Status:   Lab Results   Component Value Date    GBS Negative 2017       Active Problem List  Patient Active Problem List    Diagnosis     Gluten intolerance     Obesity (BMI 35.0-39.9 without comorbidity)     Seasonal allergic rhinitis, unspecified chronicity, unspecified trigger     CARDIOVASCULAR SCREENING; LDL GOAL LESS THAN 160     Encounter for supervision of other normal pregnancy, unspecified trimester     Sprain of neck     Segmental dysfunction of cervical region     Segmental dysfunction of thoracic region     Tension headache     Segmental dysfunction of sacral region     Segmental dysfunction of lumbar region     Lumbago     Dislocation of symphysis pubis, initial encounter     Abnormal uterine contraction       Medication Prior to Admission  Prescriptions Prior to Admission   Medication Sig Dispense Refill Last Dose     montelukast (SINGULAIR) 10 MG tablet Take 1 tablet (10 mg) by mouth At Bedtime 90 tablet 1 Past Month at 0900     fluticasone (FLONASE) 50 MCG/ACT spray Spray 2 sprays into both nostrils daily 1 Bottle 11 Past Month at 0900     Prenatal Vit-Fe Fumarate-FA (PRENATAL VITAMIN PO)    12/27/2017 at 0900   .        Maternal Past Medical History:     Past Medical History:   Diagnosis Date     NO ACTIVE PROBLEMS (aka NONE)                        Family History:     Family History   Problem Relation Age of Onset     Arthritis Mother      DIABETES Maternal Grandfather      Type II     Hyperlipidemia Paternal Grandmother      Family history reviewed            Social History:     Social History     Social History     Marital status:      Spouse name: N/A     Number of children: N/A     Years of education: N/A     Occupational History     Special Ed      Social History Main Topics     Smoking status: Never Smoker     Smokeless tobacco: Never Used     Alcohol use No     Drug use: No     Sexual activity: Yes     Partners: Male     Birth control/ protection: Pill     Other Topics Concern     Parent/Sibling W/ Cabg, Mi Or Angioplasty Before 65f 55m? No     Social History Narrative    Lives in [place] with ,  Demetrio and son, Tabby.  No smokers in the home.  No indoor cats/kittens.  No concerns about domestic violence.            Review of Systems:   C: NEGATIVE for fever, chills, change in weight  R: NEGATIVE for significant cough or SOB  CV: NEGATIVE for chest pain, palpitations or peripheral edema          Physical Exam:   Vitals were reviewed  Temp: 98.2  F (36.8  C) Temp src: Oral       Resp: 18          Constitutional:   awake, alert, cooperative, no apparent distress, and appears stated age     Lungs:   No increased work of breathing, good air exchange, clear to auscultation bilaterally, no crackles or wheezing     Cardiovascular:   Normal apical impulse, regular rate and rhythm, normal S1 and S2, no S3 or S4, and no murmur noted     Neurologic:   No clonus        Cervix:   Membranes: intact   Dilation: 8   Effacement: 100%   Station:+1   Consistency: soft   Position: Anterior  Presentation:Cephalic  Fetal Heart Rate Tracing: reactive and reassuring  Tocometer: external monitor                       Assessment:   Leah Casarez is a 40w4d pregnant female admitted with active labor management. She had good prenatal care here in [place] and normal prenatal testing.           Plan:   Admit - see IP orders  Anticipate     This document serves as a record of the services and decisions personally performed by Dwayne Campos MD. It was created on his behalf by Stephon Swain, a trained medical student. The creation of this record is based on the provider's observations and the statements of the patient.      Stephon Swain, MS3  2017    I agree with the PFSH and ROS as completed by the MS.  The remainder of the encounter was performed by me and scribed by the MS.  The scribed note accurately reflects my personal services and the decisions made by me.     Electronically signed by:  Dwayne Campos M.D.  2017

## 2017-12-28 NOTE — ANESTHESIA PROCEDURE NOTES
Peripheral nerve/Neuraxial procedure note : epidural catheter  Pre-Procedure  Performed by  JAMEEL LEIJA   Location: OB      Pre-Anesthestic Checklist: patient identified, IV checked, risks and benefits discussed, informed consent, monitors and equipment checked, pre-op evaluation and at physician/surgeon's request    Timeout  Correct Patient: Yes   Correct Procedure: Yes   Correct Site: Yes   Correct Laterality: N/A   Correct Position: Yes   Site Marked: N/A   .   Procedure Documentation    .    Procedure:    Epidural catheter.  Insertion Site:L2-3  (midline approach) Injection technique: LORT air   Local skin infiltrated with 3 mL of 1% lidocaine.       Patient Prep;mask, sterile gloves, povidone-iodine 7.5% surgical scrub, patient draped.  .  Needle: Touhy needle, Harvey Needle Gauge: 18.    Needle Length (Inches) 3.5  # of attempts: 1 and # of redirects:  .   Catheter: 20 G . .  Catheter threaded easily  3 cm epidural space.  .   .    Assessment/Narrative  Paresthesias: No.  .  .  Aspiration negative for heme or CSF  . Test dose of 3 mL lidocaine 1.5% w/ 1:200,000 epinephrine at. Test dose negative for signs of intravascular, subdural or intrathecal injection. Sensory Level Left: T6  Sensory Level Right: T6  Comments:  Patient tolerated above procedure well without difficulty.  Patient went from a 10/10 pain to a 0/10 pain, she is resting without complaint. No anesthesia complications, will follow as necessary

## 2017-12-28 NOTE — PROGRESS NOTES
SUBJECTIVE:   Patient is marbella q 3 minutes, lasting 45 seconds.  She is very uncomfortable and has an had nothing for pain management.  The FHR is 150 with multiple accelerations and no decelerations.  The overall strip is very reassuring.      Objective  Temp 98.2  F (36.8  C) (Oral)  Resp 18  LMP 03/18/2017 (Exact Date)  Exam:  SVE: shows the cervix to be 8-9 cm/90%/+1 station  Ext: no edema     ASSESSMENT:  Term IUP at 40 4/7 wks gestation  Active labor    PLAN:  Patient is getting an epidural for pain management and anticipate that she will continue to progress to deliver vaginally    Electronically signed by:  Dwayne Campos M.D.  12/27/2017

## 2017-12-28 NOTE — L&D DELIVERY NOTE
Delivery Summary    Leah Casarez MRN# [id number]   Age: 32 year old YOB: 1985     ASSESSMENT & PLAN:   Leah Casarez is a 32 year old  now P2-0-0-2 who is 40w4d gestation who had prenatal care with Payal Messina at the [facility].  The patient's pregnancy was uncomplicated , her blood type is A positive, Hep B nonreactive, Rubella immune, TreponemaAb nonreactive, HIV nonreactive.     The patient presented in active labor.     FIRST STAGE: She came in she was marbella about every 3 minutes. Cervix was 8/100/1+. SROM occurred at 2200. She had copious amounts of clear odorless fluid. She was completely dilated at that time. She received fentanyl followed by an epidural  for pain management. This worked very nicely for her.   Total time of first stage of labor was 6 hours.     SECOND STAGE: She labored down for 43 minutes and then started pushing at 2243.  She delivered a viable male   over an intact perineum in the vertex presentation at 2244.  Second stage was complicated by nuchal cord.     There was spontaneous crying and respirations noted. Baby was suctioned through the mouth and nares at completion of the delivery after it was placed [facility] [place].  There was a nuchal cord that was reduced x2. The cord was clamped x2 and cut by FOB.   Total time of second stage of labor was 44 minutes.     THIRD STAGE: Placenta delivered with active management in the Schultze presentation. There was a 3-vessel cord noted and the placenta was intact and complete on inspection.   Total time of third stage of labor was 4 minutes.     On inspection of the perineum, there was 1 second degree perineal lacerations noted.  This was repaired with 2-0 vicryl in the standard fashion with good results.  Adequate anesthesia was provided by epidural. Fundus was firm after I was done with the repair.  She received Pitocin 10 units IM after delivery of the placenta. Both mom and this male   who weighed 3147 grams or 6 pounds 15 ounces were stable when I left the delivery room. Apgars were 9 and 9 at 1 and 5 minutes respectively.     This document serves as a record of the services and decisions personally performed by Dwayne Campos MD. It was created on his behalf by Stephon Swain, a trained medical student. The creation of this record is based on the provider's observations and the statements of the patient.      Stephon Swain, MS3  2017    I agree with the PFSH and ROS as completed by the MS.  The remainder of the encounter was performed by me and scribed by the MS.  The scribed note accurately reflects my personal services and the decisions made by me.     Electronically signed by:  Dwayne Campos M.D.  2017            Labor Event Times    Labor onset date:  17 Onset time:   4:00 PM   Dilation complete date:  17 Complete time:  10:00 PM   Start pushing date/time:  2017 2243            Labor Length    1st Stage (hrs):  6 (min):  0   2nd Stage (hrs):  0 (min):  44   3rd Stage (hrs):  0 (min):  4      Labor Events     labor?:  No      Antibiotics received during labor?:  No      Rupture date/time:     Rupture type:  Spontaneous rupture of membranes occuring during spontaneous labor or augmentation   Fluid color:  Clear       Labor partogram used?:  no         Delivery/Placenta Date and Time    Delivery Date:  17 Delivery Time:  10:44 PM   Placenta Date/Time:  2017 10:48 PM   Oxytocin given at the time of delivery:  after delivery of baby      Vaginal Counts    Initial count performed by 2 team members:   Two Team Members   prabhakar Biggs Americo          Needles Suture Wallace Sponges Instruments   Initial counts 2 1 5    Added to count       Final counts 2 1 5       Placed during labor Accounted for at the end of labor   NA    NA    NA       Final count performed by 2 team members:   Two Team Members   peterson  "adriana santos         Final count correct?:  Yes         Apgars    Living status:  Living    1 Minute 5 Minute 10 Minute 15 Minute 20 Minute   Skin color: 1  1       Heart rate: 2  2       Reflex irritability: 2  2       Muscle tone: 2  2       Respiratory effort: 2  2       Total: 9  9          Apgars assigned by:  ADOLFO CAMP RN      Cord    Vessels:  3 Vessels Complications:  Nuchal   Cord Blood Disposition:  Lab          [place] Resuscitation    Methods:  Suctioning         [place] Measurements    Weight:  6 lb 15 oz Length:  1' 8.5\"   Head circumference:  34.3 cm Chest circumference:  32.4 cm      Skin to Skin and Feeding Plan    Skin to skin initiation date/time: 17 2316   Skin to skin with:  Mother   Skin to skin end date/time:     How do you plan to feed your baby:  Breastfeeding      Labor Events and Shoulder Dystocia    Fetal Tracing Prior to Delivery:  Category 1   Shoulder dystocia present?:  Neg            Delivery (Maternal) (Provider to Complete) ([id number])    Episiotomy:  None   Perineal lacerations:  2nd Repaired?:  Yes   Vaginal laceration?:  No    Cervical laceration?:  No    Est. blood loss (mL):  50         Mother's Information  Mother: Leah Casarez #[id number]    Start of Mother's Information     IO Blood Loss  17 1600 - 17 2328    Mom's I/O Activity            End of Mother's Information  Mother: Leah Casarez #[id number]            Delivery - Provider to Complete ([id number])    Delivering clinician:  JACQUES SANTOS   Attempted Delivery Types (Choose all that apply):  Spontaneous Vaginal Delivery   Delivery Type (Choose the 1 that will go to the Birth History):  Vaginal, Spontaneous Delivery                     Other personnel:   Provider Role   AN CAMP Registered Nurse   KATARZYNA GARCIA Medical Student   SYEDA HUANG Registered Nurse            Placenta    Delayed Cord Clamping:  Done   Date/Time:  2017 10:48 PM "   Removal:  Expressed   Comments:  Shultze   Disposition:  Hospital disposal      Anesthesia    Method:  Epidural   Cervical dilation at placement:  8-10         Presentation and Position    Presentation:  Vertex   Position:  Left Occiput Anterior

## 2017-12-28 NOTE — PROGRESS NOTES
S: Patient desires Epidural  B: Patient is a  who presented for active labor, She is now 8 dialated, FHT's 130  A: kasey saha CRNA called and in room at . Patient and procedure correctly identified/verified with CRNA. Consent signed. IV fluid bolus given. Patient in position for epidural placement. Epidural placed without complications. Test dose/bolus given by CRNA and patient tolerated well. Patient rates her pain after procedure as 0/10.  R: Will continue to monitor.

## 2017-12-28 NOTE — ANESTHESIA PREPROCEDURE EVALUATION
Anesthesia Evaluation     . Pt has had prior anesthetic. Type: Regional           ROS/MED HX    ENT/Pulmonary:  - neg pulmonary ROS     Neurologic:  - neg neurologic ROS     Cardiovascular:  - neg cardiovascular ROS   (+) ----. : . . . :. . No previous cardiac testing       METS/Exercise Tolerance:     Hematologic:  - neg hematologic  ROS       Musculoskeletal:  - neg musculoskeletal ROS       GI/Hepatic:  - neg GI/hepatic ROS       Renal/Genitourinary:  - ROS Renal section negative       Endo:         Psychiatric:  - neg psychiatric ROS       Infectious Disease:  - neg infectious disease ROS       Malignancy:      - no malignancy   Other:    (+) Possibly pregnant C-spine cleared: N/A, no H/O Chronic Pain,                   Physical Exam  Normal systems: cardiovascular and pulmonary    Airway   Mallampati: II  TM distance: >3 FB  Neck ROM: full    Dental     Cardiovascular   Rhythm and rate: regular and normal      Pulmonary    breath sounds clear to auscultation                    Anesthesia Plan      History & Physical Review  History and physical reviewed and following examination; no interval change.    ASA Status:  2 .    NPO Status:  > 8 hours    Plan for Epidural          Postoperative Care      Consents  Anesthetic plan, risks, benefits and alternatives discussed with:  Patient..                          .

## 2017-12-28 NOTE — ANESTHESIA POSTPROCEDURE EVALUATION
Patient: Leah Casarez    * No procedures listed *    Diagnosis:Dr. Campos  Active Labor  Diagnosis Additional Information: No value filed.    Anesthesia Type:  Epidural    Note:  Anesthesia Post Evaluation    Patient location during evaluation: Bedside  Patient participation: Able to fully participate in evaluation  Level of consciousness: awake  Pain management: adequate  Airway patency: patent  Cardiovascular status: acceptable and hemodynamically stable  Respiratory status: acceptable, room air and nonlabored ventilation  Hydration status: stable  PONV: none     Anesthetic complications: None    Comments: Patient was happy with the anesthesia care received and no anesthesia related complications were noted.  I will follow up with the patient again if it is needed.        Last vitals:  Vitals:    12/28/17 0126 12/28/17 0900 12/28/17 1540   BP: 117/59 114/55 109/61   Resp: 18 16 16   Temp: 98.4  F (36.9  C) 97.8  F (36.6  C) 98.1  F (36.7  C)   SpO2:            Electronically Signed By: ESTELLA Choi CRNA  December 28, 2017  4:25 PM

## 2017-12-28 NOTE — PROGRESS NOTES
S:  Admission  B:  Leah is a  @ 40w4d gestation, GBS negative, Hep. B negative, pregnancy uncomplicated. EFW 7lbs per MD. Has been having irregular contractions since 1100 today. Started to get more regular and painful at 1600.   A:  Patient admitted to room 333 for active labor  R: call md, set room up for delivery

## 2017-12-28 NOTE — DISCHARGE INSTRUCTIONS
[facility] Discharge Instructions     Discharge disposition:  Discharged to home       Diet:  Regular       Activity No lifting more than 20# x 2 wks then increase gradually by 10# per week.  No driving or operating machinery while on narcotic analgesics  Pelvic rest: abstain from intercourse and do not use tampons for 6 week(s)        Follow-up: Follow up with primary care provider in 6 weeks       Additional instructions: Glory-care per nursing staff        Postpartum Vaginal Delivery Instructions    Activity       Ask family and friends for help when you need it.    Do not place anything in your vagina for 6 weeks.    You are not restricted on other activities, but take it easy for a few weeks to allow your body to recover from delivery.  You are able to do any activities you feel up to that point.    No driving until you have stopped taking your pain medications (usually two weeks after delivery).     Call your health care provider if you have any of these symptoms:       Increased pain, swelling, redness, or fluid around your stiches from an episiotomy or perineal tear.    A fever above 100.4 F (38 C) with or without chills when placing a thermometer under your tongue.    You soak a sanitary pad with blood within 1 hour, or you see blood clots larger than a golf ball.    Bleeding that lasts more than 6 weeks.    Vaginal discharge that smells bad.    Severe pain, cramping or tenderness in your lower belly area.    A need to urinate more frequently (use the toilet more often), more urgently (use the toilet very quickly), or it burns when you urinate.    Nausea and vomiting.    Redness, swelling or pain around a vein in your leg.    Problems breastfeeding or a red or painful area on your breast.    Chest pain and cough or are gasping for air.    Problems coping with sadness, anxiety, or depression.  If you have any concerns about hurting yourself or the baby, call your provider immediately.      You have questions or concerns after you return home.     Keep your hands clean:  Always wash your hands before touching your perineal area and stitches.  This helps reduce your risk of infection.  If your hands aren't dirty, you may use an alcohol hand-rub to clean your hands. Keep your nails clean and short.

## 2017-12-28 NOTE — PROGRESS NOTES
S: Delivery  B: spontaneous Labor,  @ 19vil4cumj gestation, GBS negative  .  A: Patient delivered Vaginal at 2244 with Dr. santos in attendance. Baby delivered and placed on mother's low abdomen for delayed cord clamping where baby was dried and stimulated. After cord clamped and cut, baby was placed skin to skin on mother's chest within 5 minutes following delivery . Apgars 9/9. Placenta was delivered @ 2248 followed by administration of oxytocin. Bonding initiated with mom and baby. Educated mother on importance of exclusive breast feeding, expected feeding readiness cues and encouraged her to observe for feeding cues. Mother informed that breast feeding assistance would be provided. See flowsheet for VS and PP checks. Labor care plan goals met.  R: Expect routine postpartum care. Anticipate first feeding within the hour.

## 2017-12-28 NOTE — ANESTHESIA CARE TRANSFER NOTE
Patient: Leah Casarez    * No procedures listed *    Diagnosis: Dr. Campos  Active Labor  Diagnosis Additional Information: No value filed.    Anesthesia Type:   Epidural     Note:  Airway :Room Air  Patient transferred to:Labor and Delivery  Handoff Report: Identifed the Patient, Identified the Reponsible Provider, Reviewed the pertinent medical history, Discussed the surgical course, Reviewed Intra-OP anesthesia mangement and issues during anesthesia, Set expectations for post-procedure period and Allowed opportunity for questions and acknowledgement of understanding      Vitals: (Last set prior to Anesthesia Care Transfer)              Electronically Signed By: ESTELLA Choi CRNA  December 28, 2017  4:25 PM

## 2017-12-29 VITALS
OXYGEN SATURATION: 97 % | HEART RATE: 61 BPM | SYSTOLIC BLOOD PRESSURE: 106 MMHG | RESPIRATION RATE: 16 BRPM | TEMPERATURE: 97.3 F | DIASTOLIC BLOOD PRESSURE: 53 MMHG

## 2017-12-29 PROCEDURE — 25000132 ZZH RX MED GY IP 250 OP 250 PS 637: Performed by: FAMILY MEDICINE

## 2017-12-29 RX ORDER — LANOLIN 100 %
OINTMENT (GRAM) TOPICAL
Qty: 30 G | Refills: 1 | Status: SHIPPED | OUTPATIENT
Start: 2017-12-29 | End: 2018-02-14

## 2017-12-29 RX ORDER — IBUPROFEN 800 MG/1
800 TABLET, FILM COATED ORAL EVERY 6 HOURS PRN
Qty: 90 TABLET | Refills: 1 | Status: SHIPPED | OUTPATIENT
Start: 2017-12-29 | End: 2018-08-09

## 2017-12-29 RX ADMIN — SENNOSIDES AND DOCUSATE SODIUM 2 TABLET: 8.6; 5 TABLET ORAL at 08:11

## 2017-12-29 NOTE — PLAN OF CARE
Problem: Postpartum (Vaginal Delivery) (Adult,Obstetrics,Pediatric)  Goal: Signs and Symptoms of Listed Potential Problems Will be Absent, Minimized or Managed (Postpartum)  Signs and symptoms of listed potential problems will be absent, minimized or managed by discharge/transition of care (reference Postpartum (Vaginal Delivery) (Adult,Obstetrics,Pediatric) CPG).   Outcome: Improving  Independent with self and  cares. Breastfeeding going well and independent with latching. Minimal bleeding, denies any pain at this time. Wants a morning discharge if possible

## 2017-12-29 NOTE — PROGRESS NOTES
S-(situation): Discharge to home    B-(background): Patient had a Vaginal delivery with no complications. Baby boy Baby's name Kenton, breast: . Support person's name Demetrio.     A-(assessment): Stable postpartum assessment. Independent with breastfeeding. Declines additional Ibuprofen today, pain is manageable. Ready for D/C.    R-(recommendations): postpartum Discharge instructions and medications reviewed and questions answered.  Belongings gathered and returned to the patient. Agreed to follow up in 6   weeks or sooner with any question or concerns.     Nursing Discharge Checklist:    Pneumovax screened and given, if appropriate: N/A  Influenza vaccine screened and given, if appropriate: NO,up to date  Staples removed (): N/A  Breast milk returned: N/A  Hydrogel pads sent home:YES  Birth Certificate Done: YES

## 2017-12-29 NOTE — DISCHARGE SUMMARY
[facility] Discharge Summary    Leah Casarez MRN# [id number]   Age: 32 year old YOB: 1985     Date of Admission:  2017  Date of Discharge::  2017  Admitting Physician:  Dwayne Campos MD  Discharge Physician:  Dwayne Campos MD, MD     Home clinic: [facility]          Admission Diagnoses:   Abnormal uterine contraction   (spontaneous vaginal delivery)          Discharge Diagnosis:   Normal spontaneous vaginal delivery  Intrauterine pregnancy at 40 4/7 weeks gestation          Procedures:   Procedure(s): No additional procedures performed              Medications Prior to Admission:     Prescriptions Prior to Admission   Medication Sig Dispense Refill Last Dose     montelukast (SINGULAIR) 10 MG tablet Take 1 tablet (10 mg) by mouth At Bedtime 90 tablet 1 Past Month at 0900     fluticasone (FLONASE) 50 MCG/ACT spray Spray 2 sprays into both nostrils daily 1 Bottle 11 Past Month at 0900     Prenatal Vit-Fe Fumarate-FA (PRENATAL VITAMIN PO) Take 1 tablet by mouth daily    2017 at 0900             Discharge Medications:     Current Discharge Medication List      START taking these medications    Details   ibuprofen (ADVIL/MOTRIN) 800 MG tablet Take 1 tablet (800 mg) by mouth every 6 hours as needed for other (cramping)  Qty: 90 tablet, Refills: 1    Associated Diagnoses:  (spontaneous vaginal delivery)      lanolin ointment Apply topically every hour as needed for dry skin (sore nipples)  Qty: 30 g, Refills: 1    Associated Diagnoses: Breast feeding status of mother         CONTINUE these medications which have NOT CHANGED    Details   montelukast (SINGULAIR) 10 MG tablet Take 1 tablet (10 mg) by mouth At Bedtime  Qty: 90 tablet, Refills: 1    Associated Diagnoses: Seasonal allergic rhinitis, unspecified chronicity, unspecified trigger      fluticasone (FLONASE) 50 MCG/ACT spray Spray 2 sprays into both nostrils daily  Qty: 1 Bottle, Refills: 11     Associated Diagnoses: Seasonal allergic rhinitis, unspecified chronicity, unspecified trigger      Prenatal Vit-Fe Fumarate-FA (PRENATAL VITAMIN PO) Take 1 tablet by mouth daily                    Consultations:   Lactation consultation          Brief History of Labor:   Multiparous patient with unremarkable prenatal care at the [facility] with Payal NOGUEIRA.  She was to be scheduled for an induction but presented in active labor at 40 4/7 wks gestation and was 8 cm dilated.  She did get an epidural for pain and progressed nicely to a spontaneous vaginal delivery.             Hospital Course:   The patient's hospital course was unremarkable.  On discharge, her pain was well controlled. Vaginal bleeding is similar to peak menstrual flow.  Voiding without difficulty.  Ambulating well and tolerating a normal diet.  No fever.  Breastfeeding well.  Infant is stable.  She did have a BM prior to discharge  She was discharged on post-partum day #2.    Post-partum hemoglobin:   Hemoglobin   Date Value Ref Range Status   12/28/2017 11.0 (L) 11.7 - 15.7 g/dL Final             Discharge Instructions and Follow-Up:   Discharge diet: Regular   Discharge activity: No lifting more than 20# x 2 wks then increase gradually by 10# per week.  No driving or operating machinery while on narcotic analgesics  Pelvic rest: abstain from intercourse and do not use tampons for 6 week(s)    Discharge follow-up: Follow up with primary care provider in 6 weeks   Wound care: Drink plenty of fluids  Glory-care per nursing staff           Discharge Disposition:   Discharged to home      Attestation:  I have reviewed today's vital signs, notes, medications, labs and imaging.  Amount of time performed on this discharge summary: 20 minutes.    Electronically signed by:  Dwayne Campos M.D.  12/29/2017

## 2017-12-29 NOTE — PLAN OF CARE
Problem: Postpartum (Vaginal Delivery) (Adult,Obstetrics,Pediatric)  Goal: Signs and Symptoms of Listed Potential Problems Will be Absent, Minimized or Managed (Postpartum)  Signs and symptoms of listed potential problems will be absent, minimized or managed by discharge/transition of care (reference Postpartum (Vaginal Delivery) (Adult,Obstetrics,Pediatric) CPG).   Outcome: Improving  Shift note    23 hour postpartum.Vaginal delivery without complications.    Following pathway. VSS. Pain controlled with ice pack to back and ibuprofen. Independent with self and  cares. Independent with breastfeeding latch, needing encouragement and reassurance in milk production.     Continue with normal Vaginal postpartum assessments and pathway. Offer assistance as needed with cares and feedings. Plan for discharge on 17

## 2018-01-03 NOTE — PROGRESS NOTES
Leah Sangeeta Casarez  Gender: female  : 1985  [address]  [facility] [phone] (home)   Medical Record: [id number]  Primary Care Provider: Payal Messina       [facility]   ?   Discharge Phone Call: Key Words/Key Times     How are you and the baby?     How are feedings going?     Voiding & Stooling?     Any questions or concerns?     Follow-up appointment?       We want to provide excellent care here at [facility]. Do you have any feedback for us that would help us improve?     Call back COMMENTS:         Attempted Calls:   ___1/3/18 1002 callback attempt, no answer L/M per IGNACIA Benavidez RN______     ___ _______

## 2018-02-14 ENCOUNTER — PRENATAL OFFICE VISIT (OUTPATIENT)
Dept: FAMILY MEDICINE | Facility: CLINIC | Age: 33
End: 2018-02-14
Payer: COMMERCIAL

## 2018-02-14 VITALS
RESPIRATION RATE: 18 BRPM | HEIGHT: 67 IN | TEMPERATURE: 97.8 F | OXYGEN SATURATION: 99 % | HEART RATE: 89 BPM | SYSTOLIC BLOOD PRESSURE: 110 MMHG | BODY MASS INDEX: 36.73 KG/M2 | WEIGHT: 234 LBS | DIASTOLIC BLOOD PRESSURE: 70 MMHG

## 2018-02-14 DIAGNOSIS — Z30.41 ORAL CONTRACEPTIVE PILL SURVEILLANCE: ICD-10-CM

## 2018-02-14 PROBLEM — Z34.80 ENCOUNTER FOR SUPERVISION OF OTHER NORMAL PREGNANCY, UNSPECIFIED TRIMESTER: Status: RESOLVED | Noted: 2017-10-18 | Resolved: 2018-02-14

## 2018-02-14 LAB — HGB BLD-MCNC: 12.9 G/DL (ref 11.7–15.7)

## 2018-02-14 PROCEDURE — 00000218 ZZHCL STATISTIC OBHBG - HEMOGLOBIN: Performed by: PHYSICIAN ASSISTANT

## 2018-02-14 PROCEDURE — 36415 COLL VENOUS BLD VENIPUNCTURE: CPT | Performed by: PHYSICIAN ASSISTANT

## 2018-02-14 PROCEDURE — 99207 ZZC POST PARTUM EXAM: CPT | Performed by: PHYSICIAN ASSISTANT

## 2018-02-14 RX ORDER — ACETAMINOPHEN AND CODEINE PHOSPHATE 120; 12 MG/5ML; MG/5ML
1 SOLUTION ORAL DAILY
Qty: 84 TABLET | Refills: 4 | Status: SHIPPED | OUTPATIENT
Start: 2018-02-14 | End: 2018-08-09

## 2018-02-14 ASSESSMENT — PAIN SCALES - GENERAL: PAINLEVEL: NO PAIN (0)

## 2018-02-14 NOTE — PROGRESS NOTES
"  Leah is here for a 6-week postpartum checkup.    She had a  of a viable boy, weight 6 pounds 15 oz., with no complications. Date of delivery was 2017. Since delivery, she has been breast feeding.  She has no signs of infection, bleeding or other complications.  She is not pregnant.  We discussed contraceptions and she has chosen oral contraceptives.      Post partum tubal: No  History of Gestational Diabetes? No  Type of Delivery:  Vaginal  Feeding Method:  Breast  If initiated breast feeding and stopped, how long did you breast feed?:      REVIEW OF SYSTEMS:  Ears/Nose/Throat: negative  Respiratory: negative  Cardiovascular: negative  Gastrointestinal: negative  Genitourinary: negative  Musculoskeletal: negative    Neurologic: negative   Skin: negative   Endocrine:  negative  Vagina: negative  Cervix: negative  Breasts: negative  Vulva: negative  Episiotomy: negative    Contraception Plan: mini pill / progesterone only pill    Medical History Reviewed no  Family History Reviewed no  Problem List Updated no    EXAM:  /70  Pulse 89  Temp 97.8  F (36.6  C) (Tympanic)  Resp 18  Ht 5' 7.25\" (1.708 m)  Wt 234 lb (106.1 kg)  LMP 2017 (Exact Date)  SpO2 99%  BMI 36.38 kg/m2  HEENT: grossly normal.  NECK: no lymphadenopathy or thyroidomegaly.  LUNGS: CTA X 2, no rales or crackles.  BACK: No spinal or CVA tenderness.  HEART: RRR without murmurs clicks or gallops.  ABDOMEN: soft, non tender, good bowel sounds, without masses rebound, guarding or tenderness.  PELVIC:  External genitalia; normal without lesion, repair well healed.   Vagina: normal mucosa and rugae, no discharge.  Cervix: multiparous, well healed, without lesion.  Uterus: non pregnant in size, firm , mobile, no lesions,     Normal shape, position and consistency  Adnexa: non tender, without masses.    EXTREMITIES:  warm to touch, good pulses, no ankle edema or calf tenderness.  NEUROLOGIC: grossly normal.    ASSESSMENT:   6-week " postpartum exam after .    PLAN:    Contraception methods discussed  Discussed calcium intake, vitamins and supplements  Exercise encouraged  Follow up in 1 year  Hemoglobin obtained.  One-hour glucose tolerance test needed? No  mini pill / progesterone only pill for contraception. See orders.    Orders Placed This Encounter     OB HEMOGLOBIN     norethindrone (MICRONOR) 0.35 MG per tablet       AVS given to patient upon discharge today.  Electronically signed by Payal Messina PA-C  2018  12:39 PM

## 2018-02-14 NOTE — NURSING NOTE
"Chief Complaint   Patient presents with     Post Partum Exam       Initial LMP 03/18/2017 (Exact Date) Estimated body mass index is 36.03 kg/(m^2) as calculated from the following:    Height as of 6/7/17: 5' 9\" (1.753 m).    Weight as of 12/26/17: 244 lb (110.7 kg).  BP completed using cuff size: fatuma Bynum MA      "

## 2018-02-14 NOTE — MR AVS SNAPSHOT
After Visit Summary   2/14/2018    Leah Casarez    MRN: [id number]           Patient Information     Date Of Birth          1985        Visit Information        Provider Department      2/14/2018 10:45 AM Payal Messina PA-C [facility]        Today's Diagnoses     Routine postpartum follow-up    -  1    Oral contraceptive pill surveillance          Care Instructions      Postpartum Depression - English    Postpartum Depression - Occitan          Follow-ups after your visit        Who to contact     If you have questions or need follow up information about today's clinic visit or your schedule please contact [facility] directly at [phone].  Normal or non-critical lab and imaging results will be communicated to you by MyChart, letter or phone within 4 business days after the clinic has received the results. If you do not hear from us within 7 days, please contact the clinic through Learndott or phone. If you have a critical or abnormal lab result, we will notify you by phone as soon as possible.  Submit refill requests through Zerista or call your pharmacy and they will forward the refill request to us. Please allow 3 business days for your refill to be completed.          Additional Information About Your Visit        MyChart Information     Zerista gives you secure access to your electronic health record. If you see a primary care provider, you can also send messages to your care team and make appointments. If you have questions, please call your primary care clinic.  If you do not have a primary care provider, please call [phone] and they will assist you.        Care EveryWhere ID     This is your Care EveryWhere ID. This could be used by other organizations to access your [place] medical records  [id number]        Your Vitals Were     Pulse Temperature Respirations Height Last Period Pulse Oximetry    89 97.8  F (36.6  C) (Tympanic) 18 5'  "7.25\" (1.708 m) 03/18/2017 (Exact Date) 99%    BMI (Body Mass Index)                   36.38 kg/m2            Blood Pressure from Last 3 Encounters:   02/14/18 110/70   12/29/17 106/53   12/26/17 128/88    Weight from Last 3 Encounters:   02/14/18 234 lb (106.1 kg)   12/26/17 244 lb (110.7 kg)   12/20/17 245 lb (111.1 kg)              We Performed the Following     OB HEMOGLOBIN          Today's Medication Changes          These changes are accurate as of 2/14/18 12:42 PM.  If you have any questions, ask your nurse or doctor.               Start taking these medicines.        Dose/Directions    norethindrone 0.35 MG per tablet   Commonly known as:  MICRONOR   Used for:  Oral contraceptive pill surveillance   Started by:  Payal Messina PA-C        Dose:  1 tablet   Take 1 tablet (0.35 mg) by mouth daily   Quantity:  84 tablet   Refills:  4            Where to get your medicines      These medications were sent to [facility] [place] - [address]   [address] [place] [address]     Phone:  [phone]     norethindrone 0.35 MG per tablet                Primary Care Provider Office Phone # Fax #    Payal Messina PA-C [phone]       [address] [place] [address]        Equal Access to Services     [facility] AH: Hadii krysta ku hadasho Soomaali, waaxda luqadaha, qaybta kaalmada adeegyada, maddy granados . So [facility] [phone].    ATENCIÓN: Si habla español, tiene a issa disposición servicios gratuitos de asistencia lingüística. Llame al [phone].    We comply with applicable federal civil rights laws and [place] laws. We do not discriminate on the basis of race, color, national origin, age, disability, sex, sexual orientation, or gender identity.            Thank you!     Thank you for choosing [facility]  for your care. Our goal is always to provide you with excellent care. Hearing back from our " patients is one way we can continue to improve our services. Please take a few minutes to complete the written survey that you may receive in the mail after your visit with us. Thank you!             Your Updated Medication List - Protect others around you: Learn how to safely use, store and throw away your medicines at www.disposemymeds.org.          This list is accurate as of 18 12:42 PM.  Always use your most recent med list.                   Brand Name Dispense Instructions for use Diagnosis    fluticasone 50 MCG/ACT spray    FLONASE    1 Bottle    Spray 2 sprays into both nostrils daily    Seasonal allergic rhinitis, unspecified chronicity, unspecified trigger       ibuprofen 800 MG tablet    ADVIL/MOTRIN    90 tablet    Take 1 tablet (800 mg) by mouth every 6 hours as needed for other (cramping)     (spontaneous vaginal delivery)       montelukast 10 MG tablet    SINGULAIR    90 tablet    Take 1 tablet (10 mg) by mouth At Bedtime    Seasonal allergic rhinitis, unspecified chronicity, unspecified trigger       norethindrone 0.35 MG per tablet    MICRONOR    84 tablet    Take 1 tablet (0.35 mg) by mouth daily    Oral contraceptive pill surveillance       PRENATAL VITAMIN PO      Take 1 tablet by mouth daily

## 2018-08-09 ENCOUNTER — ALLIED HEALTH/NURSE VISIT (OUTPATIENT)
Dept: FAMILY MEDICINE | Facility: CLINIC | Age: 33
End: 2018-08-09
Payer: COMMERCIAL

## 2018-08-09 VITALS
SYSTOLIC BLOOD PRESSURE: 110 MMHG | DIASTOLIC BLOOD PRESSURE: 70 MMHG | BODY MASS INDEX: 37.66 KG/M2 | WEIGHT: 242.25 LBS

## 2018-08-09 DIAGNOSIS — N91.2 ABSENCE OF MENSTRUATION: Primary | ICD-10-CM

## 2018-08-09 LAB — HCG UR QL: POSITIVE

## 2018-08-09 PROCEDURE — 81025 URINE PREGNANCY TEST: CPT | Performed by: PHYSICIAN ASSISTANT

## 2018-08-09 PROCEDURE — 99207 ZZC NO CHARGE NURSE ONLY: CPT

## 2018-08-09 NOTE — MR AVS SNAPSHOT
After Visit Summary   8/9/2018    Leah Casarez    MRN: [id number]           Patient Information     Date Of Birth          1985        Visit Information        Provider Department      8/9/2018 1:30 PM PH NURSE [facility]        Today's Diagnoses     Absence of menstruation    -  1       Follow-ups after your visit        Your next 10 appointments already scheduled     Aug 13, 2018  1:00 PM CDT   New Prenatal with NL OB INTAKE   [facility]    [address] [place] [id number] [phone]              Who to contact     If you have questions or need follow up information about today's clinic visit or your schedule please contact [facility] directly at [phone].  Normal or non-critical lab and imaging results will be communicated to you by Kwarterhart, letter or phone within 4 business days after the clinic has received the results. If you do not hear from us within 7 days, please contact the clinic through Kwarterhart or phone. If you have a critical or abnormal lab result, we will notify you by phone as soon as possible.  Submit refill requests through FAGUO or call your pharmacy and they will forward the refill request to us. Please allow 3 business days for your refill to be completed.          Additional Information About Your Visit        MyChart Information     FAGUO gives you secure access to your electronic health record. If you see a primary care provider, you can also send messages to your care team and make appointments. If you have questions, please call your primary care clinic.  If you do not have a primary care provider, please call [phone] and they will assist you.        Care EveryWhere ID     This is your Care EveryWhere ID. This could be used by other organizations to access your [place] medical records  [id number]        Your Vitals Were     Last Period Breastfeeding? BMI  (Body Mass Index)             06/27/2018 (Approximate) No 37.66 kg/m2          Blood Pressure from Last 3 Encounters:   08/09/18 110/70   02/14/18 110/70   12/29/17 106/53    Weight from Last 3 Encounters:   08/09/18 242 lb 4 oz (109.9 kg)   02/14/18 234 lb (106.1 kg)   12/26/17 244 lb (110.7 kg)              We Performed the Following     HCG qualitative urine          Today's Medication Changes          These changes are accurate as of 8/9/18  3:09 PM.  If you have any questions, ask your nurse or doctor.               Stop taking these medicines if you haven't already. Please contact your care team if you have questions.     ibuprofen 800 MG tablet   Commonly known as:  ADVIL/MOTRIN           norethindrone 0.35 MG per tablet   Commonly known as:  MICRONOR                    Primary Care Provider Office Phone # Fax #    Payal Messina PA-C [phone] [address] [place] [address]        Equal Access to Services     [facility] AH: Hadii aad ku hadasho Soomaali, waaxda luqadaha, qaybta kaalmada adeegyada, waxay idiin haymichn jovon granados . So [facility] [phone].    ATENCIÓN: Si habla español, tiene a issa disposición servicios gratuitos de asistencia lingüística. Llame al [phone].    We comply with applicable federal civil rights laws and [place] laws. We do not discriminate on the basis of race, color, national origin, age, disability, sex, sexual orientation, or gender identity.            Thank you!     Thank you for choosing [facility]  for your care. Our goal is always to provide you with excellent care. Hearing back from our patients is one way we can continue to improve our services. Please take a few minutes to complete the written survey that you may receive in the mail after your visit with us. Thank you!             Your Updated Medication List - Protect others around you: Learn how to safely use, store and throw away your medicines at  www.disposemymeds.org.          This list is accurate as of 8/9/18  3:09 PM.  Always use your most recent med list.                   Brand Name Dispense Instructions for use Diagnosis    fluticasone 50 MCG/ACT spray    FLONASE    1 Bottle    Spray 2 sprays into both nostrils daily    Seasonal allergic rhinitis, unspecified chronicity, unspecified trigger       montelukast 10 MG tablet    SINGULAIR    90 tablet    Take 1 tablet (10 mg) by mouth At Bedtime    Seasonal allergic rhinitis, unspecified chronicity, unspecified trigger       PRENATAL VITAMIN PO      Take 1 tablet by mouth daily

## 2018-08-09 NOTE — NURSING NOTE
Leah Casarez is a 33 year old here today for a pregnancy test.  LMP: Patient's last menstrual period was 2018 (approximate).  Wt: 242 lbs 4 oz.    Symptoms include absence of menses.    Leah informed of positive pregnancy test results. JACKELIN: 4/3/19    Educational advice given: nutrition, smoking and drugs & alcohol.    Current medications reviewed: Yes    Previous pregnancy history remarkable for: none.    Plan: schedule appointment with OB Educator and/or OB class, follow-up appointment with Dr. Campos for pre-beverly care, take multivitamin or pre-beverly vitamins and OB Education packet given.    She is to call back if she has any questions or concerns.  She is advised to notify a provider immediately if she experiences any severe cramping or abdominal pain or any vaginal bleeding.  Sally Toussaint, GINAN, RN

## 2018-08-13 ENCOUNTER — PRENATAL OFFICE VISIT (OUTPATIENT)
Dept: FAMILY MEDICINE | Facility: CLINIC | Age: 33
End: 2018-08-13
Payer: COMMERCIAL

## 2018-08-13 DIAGNOSIS — Z34.90 SUPERVISION OF NORMAL PREGNANCY: Primary | ICD-10-CM

## 2018-08-13 PROCEDURE — 99207 ZZC NO CHARGE NURSE ONLY: CPT

## 2018-08-13 NOTE — MR AVS SNAPSHOT
After Visit Summary   8/13/2018    Leah Casarez    MRN: [id number]           Patient Information     Date Of Birth          1985        Visit Information        Provider Department      8/13/2018 1:00 PM NL OB INTAKE [facility]        Today's Diagnoses     Supervision of normal pregnancy    -  1       Follow-ups after your visit        Your next 10 appointments already scheduled     Aug 24, 2018 11:15 AM CDT   (Arrive by 11:00 AM)   US OB < 14 WEEKS SINGLE with PHUS1   [facility] ([facility])    [address] [place] [address] [phone]           Please bring a list of your medicines (including vitamins, minerals and over-the-counter drugs). Also, tell your doctor about any allergies you may have. Wear comfortable clothes and leave your valuables at home.  Drink four 8-ounce glasses of fluid an hour before your exam. If you need to empty your bladder before your exam, try to release only a little urine. Then, drink another glass of fluid.  You may have up to two family members in the exam room. If you bring a small child, an adult must be there to care for him or her. No video or camera photography during the procedure.  Please call the Imaging Department at your exam site with any questions.            Aug 24, 2018 12:00 PM CDT   LAB with NL LAB PMC   [facility] ([facility])    [address] [place] [phone]           Please do not eat 10-12 hours before your appointment if you are coming in fasting for labs on lipids, cholesterol, or glucose (sugar). This does not apply to pregnant women. Water, hot tea and black coffee (with nothing added) are okay. Do not drink other fluids, diet soda or chew gum.            Sep 24, 2018  5:20 PM CDT   New Prenatal with Dwayne Campos MD   [facility] ([address]  [place]  [facility] [phone]              Future tests that were ordered for you today     Open Standing Orders        Priority Remaining Interval Expires Ordered    ABO/Rh type and screen Routine 1/1  10/1/2018 8/13/2018    Hepatitis B surface antigen Routine 1/1  10/1/2018 8/13/2018    CBC with platelets Routine 1/1  10/1/2018 8/13/2018    HIV Antigen Antibody Combo Routine 1/1  10/1/2018 8/13/2018    Rubella Antibody IgG Quantitative Routine 1/1  10/1/2018 8/13/2018    Treponema Abs w Reflex to RPR and Titer Routine 1/1  10/1/2018 8/13/2018    Urine Culture Aerobic Bacterial Routine 1/1  10/1/2018 8/13/2018          Open Future Orders        Priority Expected Expires Ordered    US OB < 14 weeks, single,  for dating ([id number]) Routine  11/11/2018 8/13/2018            Who to contact     If you have questions or need follow up information about today's clinic visit or your schedule please contact [facility] directly at [phone].  Normal or non-critical lab and imaging results will be communicated to you by Valutaohart, letter or phone within 4 business days after the clinic has received the results. If you do not hear from us within 7 days, please contact the clinic through Nanameuet or phone. If you have a critical or abnormal lab result, we will notify you by phone as soon as possible.  Submit refill requests through Nexsan or call your pharmacy and they will forward the refill request to us. Please allow 3 business days for your refill to be completed.          Additional Information About Your Visit        Nexsan Information     Nexsan gives you secure access to your electronic health record. If you see a primary care provider, you can also send messages to your care team and make appointments. If you have questions, please call your primary care clinic.  If you do not have a primary care provider, please call [phone] and they will assist you.        Care EveryWhere ID      This is your Care EveryWhere ID. This could be used by other organizations to access your [place] medical records  [id number]        Your Vitals Were     Last Period                   06/27/2018 (Approximate)            Blood Pressure from Last 3 Encounters:   08/09/18 110/70   02/14/18 110/70   12/29/17 106/53    Weight from Last 3 Encounters:   08/09/18 242 lb 4 oz (109.9 kg)   02/14/18 234 lb (106.1 kg)   12/26/17 244 lb (110.7 kg)               Primary Care Provider Office Phone # Fax #    Payal Messina PA-C [phone] [address] [place] [address]        Equal Access to Services     [facility] : Hadii krysta crain hadasho Soomaali, waaxda luqadaha, qaybta kaalmada adeegyada, maddy granados . So [facility] [phone].    ATENCIÓN: Si habla español, tiene a issa disposición servicios gratuitos de asistencia lingüística. [facility] [phone].    We comply with applicable federal civil rights laws and [place] laws. We do not discriminate on the basis of race, color, national origin, age, disability, sex, sexual orientation, or gender identity.            Thank you!     Thank you for choosing [facility]  for your care. Our goal is always to provide you with excellent care. Hearing back from our patients is one way we can continue to improve our services. Please take a few minutes to complete the written survey that you may receive in the mail after your visit with us. Thank you!             Your Updated Medication List - Protect others around you: Learn how to safely use, store and throw away your medicines at www.disposemymeds.org.          This list is accurate as of 8/13/18  2:51 PM.  Always use your most recent med list.                   Brand Name Dispense Instructions for use Diagnosis    fluticasone 50 MCG/ACT spray    FLONASE    1 Bottle    Spray 2 sprays into both nostrils daily    Seasonal allergic rhinitis, unspecified chronicity,  unspecified trigger       montelukast 10 MG tablet    SINGULAIR    90 tablet    Take 1 tablet (10 mg) by mouth At Bedtime    Seasonal allergic rhinitis, unspecified chronicity, unspecified trigger       PRENATAL VITAMIN PO      Take 1 tablet by mouth daily

## 2018-08-24 ENCOUNTER — HOSPITAL ENCOUNTER (OUTPATIENT)
Dept: ULTRASOUND IMAGING | Facility: CLINIC | Age: 33
Discharge: HOME | End: 2018-08-24
Attending: FAMILY MEDICINE | Admitting: FAMILY MEDICINE
Payer: COMMERCIAL

## 2018-08-24 DIAGNOSIS — Z34.90 SUPERVISION OF NORMAL PREGNANCY: ICD-10-CM

## 2018-08-24 LAB
ABO + RH BLD: NORMAL
ABO + RH BLD: NORMAL
BLD GP AB SCN SERPL QL: NORMAL
BLOOD BANK CMNT PATIENT-IMP: NORMAL
ERYTHROCYTE [DISTWIDTH] IN BLOOD BY AUTOMATED COUNT: 13 % (ref 10–15)
HCT VFR BLD AUTO: 35.9 % (ref 35–47)
HGB BLD-MCNC: 11.8 G/DL (ref 11.7–15.7)
MCH RBC QN AUTO: 30.4 PG (ref 26.5–33)
MCHC RBC AUTO-ENTMCNC: 32.9 G/DL (ref 31.5–36.5)
MCV RBC AUTO: 93 FL (ref 78–100)
PLATELET # BLD AUTO: 282 10E9/L (ref 150–450)
RBC # BLD AUTO: 3.88 10E12/L (ref 3.8–5.2)
SPECIMEN EXP DATE BLD: NORMAL
WBC # BLD AUTO: 9.5 10E9/L (ref 4–11)

## 2018-08-24 PROCEDURE — 86900 BLOOD TYPING SEROLOGIC ABO: CPT | Performed by: FAMILY MEDICINE

## 2018-08-24 PROCEDURE — 86762 RUBELLA ANTIBODY: CPT | Performed by: FAMILY MEDICINE

## 2018-08-24 PROCEDURE — 87389 HIV-1 AG W/HIV-1&-2 AB AG IA: CPT | Performed by: FAMILY MEDICINE

## 2018-08-24 PROCEDURE — 86901 BLOOD TYPING SEROLOGIC RH(D): CPT | Performed by: FAMILY MEDICINE

## 2018-08-24 PROCEDURE — 76801 OB US < 14 WKS SINGLE FETUS: CPT

## 2018-08-24 PROCEDURE — 86850 RBC ANTIBODY SCREEN: CPT | Performed by: FAMILY MEDICINE

## 2018-08-24 PROCEDURE — 36415 COLL VENOUS BLD VENIPUNCTURE: CPT | Performed by: FAMILY MEDICINE

## 2018-08-24 PROCEDURE — 87340 HEPATITIS B SURFACE AG IA: CPT | Performed by: FAMILY MEDICINE

## 2018-08-24 PROCEDURE — 86780 TREPONEMA PALLIDUM: CPT | Performed by: FAMILY MEDICINE

## 2018-08-24 PROCEDURE — 85027 COMPLETE CBC AUTOMATED: CPT | Performed by: FAMILY MEDICINE

## 2018-08-25 LAB
RUBV IGG SERPL IA-ACNC: 35 IU/ML
T PALLIDUM AB SER QL: NONREACTIVE

## 2018-08-27 LAB
HBV SURFACE AG SERPL QL IA: NONREACTIVE
HIV 1+2 AB+HIV1 P24 AG SERPL QL IA: NONREACTIVE

## 2018-09-24 ENCOUNTER — PRENATAL OFFICE VISIT (OUTPATIENT)
Dept: FAMILY MEDICINE | Facility: CLINIC | Age: 33
End: 2018-09-24
Payer: COMMERCIAL

## 2018-09-24 VITALS
SYSTOLIC BLOOD PRESSURE: 138 MMHG | OXYGEN SATURATION: 100 % | WEIGHT: 243.2 LBS | DIASTOLIC BLOOD PRESSURE: 78 MMHG | RESPIRATION RATE: 20 BRPM | HEART RATE: 101 BPM | BODY MASS INDEX: 37.81 KG/M2 | TEMPERATURE: 97.1 F

## 2018-09-24 DIAGNOSIS — O26.812 FATIGUE DURING PREGNANCY IN SECOND TRIMESTER: ICD-10-CM

## 2018-09-24 DIAGNOSIS — Z34.82 ENCOUNTER FOR SUPERVISION OF OTHER NORMAL PREGNANCY IN SECOND TRIMESTER: ICD-10-CM

## 2018-09-24 DIAGNOSIS — Z23 NEED FOR PROPHYLACTIC VACCINATION AND INOCULATION AGAINST INFLUENZA: ICD-10-CM

## 2018-09-24 DIAGNOSIS — Z23 NEED FOR VACCINATION: ICD-10-CM

## 2018-09-24 DIAGNOSIS — N81.89 PELVIC RELAXATION DISORDER: ICD-10-CM

## 2018-09-24 PROBLEM — Z34.90 SUPERVISION OF NORMAL PREGNANCY: Status: ACTIVE | Noted: 2018-09-24

## 2018-09-24 LAB
HGB BLD-MCNC: 11.9 G/DL (ref 11.7–15.7)
TSH SERPL DL<=0.005 MIU/L-ACNC: 1.25 MU/L (ref 0.4–4)

## 2018-09-24 PROCEDURE — 90686 IIV4 VACC NO PRSV 0.5 ML IM: CPT | Performed by: FAMILY MEDICINE

## 2018-09-24 PROCEDURE — 90746 HEPB VACCINE 3 DOSE ADULT IM: CPT | Performed by: FAMILY MEDICINE

## 2018-09-24 PROCEDURE — 99207 ZZC FIRST OB VISIT: CPT | Performed by: FAMILY MEDICINE

## 2018-09-24 PROCEDURE — 90472 IMMUNIZATION ADMIN EACH ADD: CPT | Performed by: FAMILY MEDICINE

## 2018-09-24 PROCEDURE — 36415 COLL VENOUS BLD VENIPUNCTURE: CPT | Performed by: FAMILY MEDICINE

## 2018-09-24 PROCEDURE — 84443 ASSAY THYROID STIM HORMONE: CPT | Performed by: FAMILY MEDICINE

## 2018-09-24 PROCEDURE — 90471 IMMUNIZATION ADMIN: CPT | Performed by: FAMILY MEDICINE

## 2018-09-24 PROCEDURE — 87086 URINE CULTURE/COLONY COUNT: CPT | Performed by: FAMILY MEDICINE

## 2018-09-24 PROCEDURE — 00000218 ZZHCL STATISTIC OBHBG - HEMOGLOBIN: Performed by: FAMILY MEDICINE

## 2018-09-24 ASSESSMENT — PAIN SCALES - GENERAL: PAINLEVEL: NO PAIN (0)

## 2018-09-24 NOTE — MR AVS SNAPSHOT
After Visit Summary   9/24/2018    Leah Casarez    MRN: [id number]           Patient Information     Date Of Birth          1985        Visit Information        Provider Department      9/24/2018 5:20 PM Dwayne Campos MD [facility]        Today's Diagnoses     Supervision of normal pregnancy    -  1    Need for prophylactic vaccination and inoculation against influenza        Need for vaccination        Fatigue during pregnancy in second trimester           Follow-ups after your visit        Your next 10 appointments already scheduled     Oct 24, 2018  4:00 PM CDT   ESTABLISHED PRENATAL with Dwayne Campos MD   [facility] ([facility]    [address] [place] [phone]            Nov 27, 2018  3:40 PM CST   ESTABLISHED PRENATAL with Dwayne Campos MD   [facility] ([facility])    [address] [place] [address] [phone]              Who to contact     If you have questions or need follow up information about today's clinic visit or your schedule please contact [facility] directly at [phone].  Normal or non-critical lab and imaging results will be communicated to you by Topell Energyhart, letter or phone within 4 business days after the clinic has received the results. If you do not hear from us within 7 days, please contact the clinic through HydroNovationt or phone. If you have a critical or abnormal lab result, we will notify you by phone as soon as possible.  Submit refill requests through Eventap or call your pharmacy and they will forward the refill request to us. Please allow 3 business days for your refill to be completed.          Additional Information About Your Visit        Topell Energyhart Information     Eventap gives you secure access to your electronic health record. If you see a primary care provider, you can also send messages to your care  team and make appointments. If you have questions, please call your primary care clinic.  If you do not have a primary care provider, please call [phone] and they will assist you.        Care EveryWhere ID     This is your Care EveryWhere ID. This could be used by other organizations to access your [place] medical records  [id number]        Your Vitals Were     Pulse Temperature Respirations Last Period Pulse Oximetry BMI (Body Mass Index)    101 97.1  F (36.2  C) (Temporal) 20 06/27/2018 (Approximate) 100% 37.81 kg/m2       Blood Pressure from Last 3 Encounters:   09/24/18 138/78   08/09/18 110/70   02/14/18 110/70    Weight from Last 3 Encounters:   09/24/18 243 lb 3.2 oz (110.3 kg)   08/09/18 242 lb 4 oz (109.9 kg)   02/14/18 234 lb (106.1 kg)              We Performed the Following     Each additional admin.  (Right click and add QUANTITY)  [00325]     FLU VACCINE, SPLIT VIRUS, IM (QUADRIVALENT) [97442]- >3 YRS     HEPATITIS B VACCINE,  ADULT  [58055]     OB hemoglobin     TSH with free T4 reflex     Urine Culture Aerobic Bacterial     Vaccine Administration, Initial [64034]        Primary Care Provider Office Phone # Fax #    Payal Messina PA-C [phone] [address] [place] [address]        Equal Access to Services     [facility] AH: Hadii aad ku hadasho Soanders, waaxda luqadaha, qaybta kaalmada elva, maddy stevens. So [facility] [phone].    ATENCIÓN: Si habla español, tiene a issa disposición servicios gratuitos de asistencia lingüística. Skinny al [phone].    We comply with applicable federal civil rights laws and [place] laws. We do not discriminate on the basis of race, color, national origin, age, disability, sex, sexual orientation, or gender identity.            Thank you!     Thank you for choosing [facility]  for your care. Our goal is always to provide you with excellent care. Hearing back from our patients  is one way we can continue to improve our services. Please take a few minutes to complete the written survey that you may receive in the mail after your visit with us. Thank you!             Your Updated Medication List - Protect others around you: Learn how to safely use, store and throw away your medicines at www.disposemymeds.org.          This list is accurate as of 9/24/18  6:18 PM.  Always use your most recent med list.                   Brand Name Dispense Instructions for use Diagnosis    fluticasone 50 MCG/ACT spray    FLONASE    1 Bottle    Spray 2 sprays into both nostrils daily    Seasonal allergic rhinitis, unspecified chronicity, unspecified trigger       montelukast 10 MG tablet    SINGULAIR    90 tablet    Take 1 tablet (10 mg) by mouth At Bedtime    Seasonal allergic rhinitis, unspecified chronicity, unspecified trigger       PRENATAL VITAMIN PO      Take 1 tablet by mouth daily

## 2018-09-24 NOTE — PROGRESS NOTES
INITIAL OB ASSESSMENT                     Date: 2018  Age: 33 year old   Plan Number: [id number]  Name: Leah Casarez    : 1985  Phone: [phone] (home)  Marital Status:,    Race/Ethnicity:    PC Provider:  Payal Messina    Muslim: None  OB Physician: Dr. Campos    Partner's Name: Demetrio    Partner's Occupation:  /    OB CNM/NP/PA:         See Specialty History for details.    LMP: , Cycle length: 4-5 days  LMP Certain?:Yes  LMP Normal?: Yes     No Known Allergies     Current Outpatient Prescriptions   Medication Sig Dispense Refill     fluticasone (FLONASE) 50 MCG/ACT spray Spray 2 sprays into both nostrils daily 1 Bottle 11     montelukast (SINGULAIR) 10 MG tablet Take 1 tablet (10 mg) by mouth At Bedtime 90 tablet 1     Prenatal Vit-Fe Fumarate-FA (PRENATAL VITAMIN PO) Take 1 tablet by mouth daily        Social History: Benign, No substance abuse, environmental exposures, mental health risks and No financial concerns,pets. Partner support.     Past Medical History of Father of Baby:   High cholesterol     Past Medical History of Patient:  Past Medical History:   Diagnosis Date     NO ACTIVE PROBLEMS (aka NONE)      Surgical History:       Past Surgical History:   Procedure Laterality Date     NO HISTORY OF SURGERY       Family History:   Family History   Problem Relation Age of Onset     Arthritis Mother      No Known Problems Father      No Known Problems Maternal Grandmother      Diabetes Maternal Grandfather      Type II     Hyperlipidemia Paternal Grandmother      No Known Problems Paternal Grandfather      No Known Problems Brother      No Known Problems Son      No Known Problems Son      Genetic History: No known genectic disease in 1st or 2nd degree relatives and No known genectic disease in FOB's 1st ot 2nd degree relatives     Review of Systems: General: Benign and Pre-pregnancy weight 235 #;  Cardiovascular:Benign,    Respiratory:  Benign;  Gastrointestinal Nausea;  Genitourinary: Benign;  Neuromuscular: Benign;   Endocrine: Benign;  Dermatologic Benign;   Psychiatric: Benign.     History Since Last Menstrual Period: Nausea with some mild pelvic discomfort already.      Physical Exam: General: Well developed, well nourished female and Obese;   Skin: Normal;   HEENT: PERRLA, EOMI, fundi benign;   Neck: Supple, no adenopathy and thyroid normal;   Chest: Clear to auscultation;   Heart: Regular rate, rhythm and No murmur, rub, gallop;  Breasts: Not examined;   Abdomen: Benign, Soft, flat, non-tender, No masses, organomegaly, No inguinal nodes, Bowel sounds normoactive and FHR 160s; fundal height is group home to the umbilicus and nontender.  Extremities: Normal and No edema;  Neurological: Normal;   Perineum: Intact;   Vulva: Normal genitalia;  Vagina: Normal mucosa, no discharge;;   Cervix: Parous, closed, mobile, no discharge and Length 4 cm;  Uterus: 12 weeks;  Rectum: NE;   Bony Pelvis: Adequate and Gynecoid.   Pelvimetry: not done    Assessment:   IUP 12-5/7 weeks gestation  Short interval pregnancy  Pelvis proven to 7 pounds 5 ounces  Early pelvic relaxation     Plan:  Follow up in 4 weeks.  Normal exercise.  Normal sexual activity.  Prenatal vitamins.  Anticipated weight gain.  I discussed alpha-fetoprotein quad screening and she did not do this with her previous 2 pregnancies so believes that she and her  will decline at this time also.  She will let me know at her next visit at which time it would be due to be drawn.  We discussed issues that can arise from short interval between pregnancies like  labor she did not have any issues with either of her previous pregnancies.    She did get a flu vaccine today and wanted to start the hepatitis B vaccine series.  We will give her her second hep B in 2 months.  I will make referral to PT so that they can discuss issues with pelvic relaxation and try to teach her some exercises that  may help minimize her discomfort.    Answers for HPI/ROS submitted by the patient on 9/23/2018   PHQ-2 Score: 0    Electronically signed by:  Dwayne Campos M.D.  9/24/2018

## 2018-09-24 NOTE — PROGRESS NOTES
Injectable Influenza Immunization Documentation    1.  Is the person to be vaccinated sick today?   No    2. Does the person to be vaccinated have an allergy to a component   of the vaccine?   No  Egg Allergy Algorithm Link    3. Has the person to be vaccinated ever had a serious reaction   to influenza vaccine in the past?   No    4. Has the person to be vaccinated ever had Guillain-Barré syndrome?   No    Form completed by Kandice Mcbride CMA  Prior to injection verified patient identity using patient's name and date of birth.  Due to injection administration, patient instructed to remain in clinic for 15 minutes  afterwards, and to report any adverse reaction to me immediately.           Screening Questionnaire for Adult Immunization    Are you sick today?   No   Do you have allergies to medications, food, a vaccine component or latex?   No   Have you ever had a serious reaction after receiving a vaccination?   No   Do you have a long-term health problem with heart disease, lung disease, asthma, kidney disease, metabolic disease (e.g. diabetes), anemia, or other blood disorder?   No   Do you have cancer, leukemia, HIV/AIDS, or any other immune system problem?   No   In the past 3 months, have you taken medications that affect  your immune system, such as prednisone, other steroids, or anticancer drugs; drugs for the treatment of rheumatoid arthritis, Crohn s disease, or psoriasis; or have you had radiation treatments?   No   Have you had a seizure, or a brain or other nervous system problem?   No   During the past year, have you received a transfusion of blood or blood     products, or been given immune (gamma) globulin or antiviral drug?   No   For women: Are you pregnant or is there a chance you could become        pregnant during the next month?   No   Have you received any vaccinations in the past 4 weeks?   No     Immunization questionnaire answers were all negative. Prior to injection verified patient  identity using patient's name and date of birth.  Due to injection administration, patient instructed to remain in clinic for 15 minutes  afterwards, and to report any adverse reaction to me immediately.           Per orders of Dr. Campos , injection of HEP B  given by Kandice Mcbride. Patient instructed to remain in clinic for 15 minutes afterwards, and to report any adverse reaction to me immediately.       Screening performed by Kandice Mcbride on 9/24/2018 at 6:10 PM.

## 2018-09-25 LAB
BACTERIA SPEC CULT: NO GROWTH
Lab: NORMAL
SPECIMEN SOURCE: NORMAL

## 2018-10-24 ENCOUNTER — PRENATAL OFFICE VISIT (OUTPATIENT)
Dept: FAMILY MEDICINE | Facility: CLINIC | Age: 33
End: 2018-10-24
Payer: COMMERCIAL

## 2018-10-24 VITALS
OXYGEN SATURATION: 99 % | WEIGHT: 243 LBS | HEART RATE: 104 BPM | RESPIRATION RATE: 18 BRPM | DIASTOLIC BLOOD PRESSURE: 64 MMHG | BODY MASS INDEX: 37.78 KG/M2 | TEMPERATURE: 97.8 F | SYSTOLIC BLOOD PRESSURE: 114 MMHG

## 2018-10-24 DIAGNOSIS — Z34.82 ENCOUNTER FOR SUPERVISION OF OTHER NORMAL PREGNANCY IN SECOND TRIMESTER: Primary | ICD-10-CM

## 2018-10-24 PROCEDURE — 99207 ZZC PRENATAL VISIT: CPT | Performed by: FAMILY MEDICINE

## 2018-10-24 ASSESSMENT — PAIN SCALES - GENERAL: PAINLEVEL: NO PAIN (0)

## 2018-10-24 NOTE — MR AVS SNAPSHOT
After Visit Summary   10/24/2018    Leah Casarez    MRN: [id number]           Patient Information     Date Of Birth          1985        Visit Information        Provider Department      10/24/2018 4:00 PM Dwayne Campos MD [facility]         Follow-ups after your visit        Your next 10 appointments already scheduled     Nov 27, 2018  3:40 PM CST   ESTABLISHED PRENATAL with Dwayne Campos MD   [facility] ([facility])    [address] [place] [address]   [phone]            Dec 12, 2018  3:20 PM CST   ESTABLISHED PRENATAL with Dwayne Campos MD   [facility] ([facility])    [address] [place] [phone]   [phone]            Jan 09, 2019  3:20 PM CST   ESTABLISHED PRENATAL with Dwayne Campos MD   [facility] ([facility])    [address] [place] [phone]   [phone]            Feb 06, 2019  3:00 PM CST   ESTABLISHED PRENATAL with Dwayne Campos MD   [facility] ([facility])    [address] [place] [phone]   [phone]            Feb 20, 2019  3:20 PM CST   ESTABLISHED PRENATAL with Dwayne Campos MD   [facility] ([facility])    [address] [place] [address]   [phone]            Mar 06, 2019  3:20 PM CST   ESTABLISHED PRENATAL with Dwayne Campos MD   [facility] ([facility])    [address] [place] [phone]   [phone]            Mar 13, 2019  3:20 PM CDT   ESTABLISHED PRENATAL with Dwayne Campos MD   [facility] ([facility])    [address] [place] [address]   [phone]            Mar 20, 2019  3:20 PM CDT   ESTABLISHED PRENATAL with Dwayne Campos MD   [facility]  [address] [id number] [phone]            Mar 27, 2019  3:20 PM CDT   ESTABLISHED PRENATAL with Dwayne Campos MD   [address] [phone]            Apr 03, 2019  3:20 PM CDT   ESTABLISHED PRENATAL with Dwayne Campos MD   [facility] ([address] [phone]              Who to contact     If you have questions or need follow up information about today's clinic visit or your schedule please contact [facility] directly at [phone].  Normal or non-critical lab and imaging results will be communicated to you by MyChart, letter or phone within 4 business days after the clinic has received the results. If you do not hear from us within 7 days, please contact the clinic through [employer] or phone. If you have a critical or abnormal lab result, we will notify you by phone as soon as possible.  Submit refill requests through HangIt or call your pharmacy and they will forward the refill request to us. Please allow 3 business days for your refill to be completed.          Additional Information About Your Visit        E[employer] Information     HangIt gives you secure access to your electronic health record. If you see a primary care provider, you can also send messages to your care team and make appointments. If you have questions, please call your primary care clinic.  If you do not have a primary care provider, please call [phone] and they will assist you.        Care EveryWhere ID     This is your Care EveryWhere ID. This could be used by other organizations to access your [place] medical records  [id number]        Your Vitals Were     Pulse Temperature Respirations Last Period Pulse Oximetry BMI (Body Mass Index)    104 97.8  F (36.6  C) (Temporal) 18 06/27/2018  (Approximate) 99% 37.78 kg/m2       Blood Pressure from Last 3 Encounters:   10/24/18 114/64   09/24/18 138/78   08/09/18 110/70    Weight from Last 3 Encounters:   10/24/18 243 lb (110.2 kg)   09/24/18 243 lb 3.2 oz (110.3 kg)   08/09/18 242 lb 4 oz (109.9 kg)              Today, you had the following     No orders found for display       Primary Care Provider Office Phone # Fax #    Payal Messina PA-C [phone] [address] [place] [address]        Equal Access to Services     [facility]: Hadii krysta crain hadasho Soanders, waaxda luqadaha, qaybta kaalmada adejumanayada, maddy granados . So [facility] [phone].    ATENCIÓN: Si habla español, tiene a issa disposición servicios gratuitos de asistencia lingüística. Ll[facility] [phone].    We comply with applicable federal civil rights laws and [place] laws. We do not discriminate on the basis of race, color, national origin, age, disability, sex, sexual orientation, or gender identity.            Thank you!     Thank you for choosing [facility]  for your care. Our goal is always to provide you with excellent care. Hearing back from our patients is one way we can continue to improve our services. Please take a few minutes to complete the written survey that you may receive in the mail after your visit with us. Thank you!             Your Updated Medication List - Protect others around you: Learn how to safely use, store and throw away your medicines at www.disposemymeds.org.          This list is accurate as of 10/24/18  4:28 PM.  Always use your most recent med list.                   Brand Name Dispense Instructions for use Diagnosis    fluticasone 50 MCG/ACT spray    FLONASE    1 Bottle    Spray 2 sprays into both nostrils daily    Seasonal allergic rhinitis, unspecified chronicity, unspecified trigger       montelukast 10 MG tablet    SINGULAIR    90 tablet    Take 1 tablet (10 mg) by mouth At  Bedtime    Seasonal allergic rhinitis, unspecified chronicity, unspecified trigger       PRENATAL VITAMIN PO      Take 1 tablet by mouth daily

## 2018-10-24 NOTE — PROGRESS NOTES
Taking PNVs, no problems. Declined AFP quad screen. Will set her up for her 20 wk fetal anatomy survey.    Electronically signed by:  Dwayne Campos M.D.  10/24/2018

## 2018-11-05 ENCOUNTER — TELEPHONE (OUTPATIENT)
Dept: FAMILY MEDICINE | Facility: CLINIC | Age: 33
End: 2018-11-05

## 2018-11-05 ENCOUNTER — PRENATAL OFFICE VISIT (OUTPATIENT)
Dept: FAMILY MEDICINE | Facility: CLINIC | Age: 33
End: 2018-11-05
Payer: COMMERCIAL

## 2018-11-05 VITALS
BODY MASS INDEX: 37.31 KG/M2 | RESPIRATION RATE: 18 BRPM | OXYGEN SATURATION: 98 % | WEIGHT: 240 LBS | TEMPERATURE: 97.3 F | SYSTOLIC BLOOD PRESSURE: 134 MMHG | DIASTOLIC BLOOD PRESSURE: 64 MMHG | HEART RATE: 88 BPM

## 2018-11-05 DIAGNOSIS — Z34.82 ENCOUNTER FOR SUPERVISION OF OTHER NORMAL PREGNANCY IN SECOND TRIMESTER: Primary | ICD-10-CM

## 2018-11-05 PROCEDURE — 99207 ZZC PRENATAL VISIT: CPT | Performed by: FAMILY MEDICINE

## 2018-11-05 ASSESSMENT — PAIN SCALES - GENERAL: PAINLEVEL: NO PAIN (0)

## 2018-11-05 NOTE — TELEPHONE ENCOUNTER
Patient called back and she is not having any bleeding or cramping. She will see us at 3:20 today.  MP/MA

## 2018-11-05 NOTE — MR AVS SNAPSHOT
After Visit Summary   11/5/2018    Leah Casarez    MRN: [id number]           Patient Information     Date Of Birth          1985        Visit Information        Provider Department      11/5/2018 3:20 PM Dwayne Campos MD [facility]         Follow-ups after your visit        Your next 10 appointments already scheduled     Nov 14, 2018  4:00 PM CST   US OB < 14 WEEKS SINGLE with PHUS1   [facility] ([facility])    [address] [place] [phone]           How do I prepare for my exam? (Food and drink instructions) Drink four 8-ounce glasses of fluid an hour before your exam. If you need to empty your bladder before your exam, try to release only a little urine. Then, drink another glass of fluid.  How do I prepare for my exam? (Other instructions) You may have up to two family members in the exam room. If you bring a small child, an adult must be there to care for him or her. No video or camera photography during the procedure.  What should I wear: Wear comfortable clothes.  How long does the exam take: Most ultrasounds take 30 to 60 minutes.  What should I bring: Bring a list of your medicines, including vitamins, minerals and over-the-counter drugs. It is safest to leave personal items at home.  Do I need a :  No  is needed.  What do I need to tell my doctor: Tell your doctor about any allergies you may have.  What should I do after the exam: No restrictions, You may resume normal activities.  What is this test: An ultrasound uses sound waves to make pictures of the body. Sound waves do not cause pain. The only discomfort may be the pressure of the wand against your skin or full bladder.  Who should I call with questions: If you have any questions, please call the Imaging Department where you will have your exam. Directions, parking instructions, and other information is available on our  website, [email or web address].            Nov 27, 2018  3:40 PM CST   ESTABLISHED PRENATAL with Dwayne Campos MD   [facility]    [address] [place] [phone]   [phone]            Dec 12, 2018  3:20 PM CST   ESTABLISHED PRENATAL with Dwayne Campos MD   [facility]    [address] [place] [address]   [phone]            Jan 09, 2019  3:20 PM CST   ESTABLISHED PRENATAL with Dwayne Campos MD   [facility]    [address] [place] [address]   [phone]            Feb 06, 2019  3:00 PM CST   ESTABLISHED PRENATAL with Dwayne Campos MD   [facility]    [address] [place] [address]   [phone]            Feb 20, 2019  3:20 PM CST   ESTABLISHED PRENATAL with Dwayne Campos MD   [facility]    [address] [place] [address]   [phone]            Mar 06, 2019  3:20 PM CST   ESTABLISHED PRENATAL with Dwayne Campos MD   [facility]    [address] [place] [address]   [phone]            Mar 13, 2019  3:20 PM CDT   ESTABLISHED PRENATAL with Dwayne Campos MD   [facility]    [address] [place] [address]   [phone]            Mar 20, 2019  3:20 PM CDT   ESTABLISHED PRENATAL with Dwayne Campos MD   [facility]    [address] [place] [address]   [phone]            Mar 27, 2019  3:20 PM CDT   ESTABLISHED PRENATAL with Dwayne Campos MD   [facility]    [address] [place] [address]   [phone]              Who to contact     If you have  questions or need follow up information about today's clinic visit or your schedule please contact [facility] directly at [phone].  Normal or non-critical lab and imaging results will be communicated to you by MyChart, letter or phone within 4 business days after the clinic has received the results. If you do not hear from us within 7 days, please contact the clinic through [employer] or phone. If you have a critical or abnormal lab result, we will notify you by phone as soon as possible.  Submit refill requests through hhgregg or call your pharmacy and they will forward the refill request to us. Please allow 3 business days for your refill to be completed.          Additional Information About Your Visit        [employer] Information     hhgregg gives you secure access to your electronic health record. If you see a primary care provider, you can also send messages to your care team and make appointments. If you have questions, please call your primary care clinic.  If you do not have a primary care provider, please call [phone] and they will assist you.        Care EveryWhere ID     This is your Care EveryWhere ID. This could be used by other organizations to access your [place] medical records  [id number]        Your Vitals Were     Pulse Temperature Respirations Last Period Pulse Oximetry BMI (Body Mass Index)    88 97.3  F (36.3  C) (Temporal) 18 06/27/2018 (Approximate) 98% 37.31 kg/m2       Blood Pressure from Last 3 Encounters:   11/05/18 134/64   10/24/18 114/64   09/24/18 138/78    Weight from Last 3 Encounters:   11/05/18 240 lb (108.9 kg)   10/24/18 243 lb (110.2 kg)   09/24/18 243 lb 3.2 oz (110.3 kg)              Today, you had the following     No orders found for display       Primary Care Provider Office Phone # Fax #    Dwayne Campos [phone]       [address] [place] [id number]        Equal Access to Services     [facility]: Maia crain  kevin Gusman, wadioneda luvladimiradaha, qaybta kagelada elva, maddy reynain hayaaandie marinojumana amandahood laKalyanijoyce rodney. So [facility] [phone].    ATENCIÓN: Si habla jeffery, tiene a issa disposición servicios gratuitos de asistencia lingüística. Skinny al [phone].    We comply with applicable federal civil rights laws and [place] laws. We do not discriminate on the basis of race, color, national origin, age, disability, sex, sexual orientation, or gender identity.            Thank you!     Thank you for choosing [facility]  for your care. Our goal is always to provide you with excellent care. Hearing back from our patients is one way we can continue to improve our services. Please take a few minutes to complete the written survey that you may receive in the mail after your visit with us. Thank you!             Your Updated Medication List - Protect others around you: Learn how to safely use, store and throw away your medicines at www.disposemymeds.org.          This list is accurate as of 11/5/18  6:27 PM.  Always use your most recent med list.                   Brand Name Dispense Instructions for use Diagnosis    fluticasone 50 MCG/ACT spray    FLONASE    1 Bottle    Spray 2 sprays into both nostrils daily    Seasonal allergic rhinitis, unspecified chronicity, unspecified trigger       montelukast 10 MG tablet    SINGULAIR    90 tablet    Take 1 tablet (10 mg) by mouth At Bedtime    Seasonal allergic rhinitis, unspecified chronicity, unspecified trigger       PRENATAL VITAMIN PO      Take 1 tablet by mouth daily

## 2018-11-05 NOTE — NURSING NOTE
Chief Complaint   Patient presents with     Prenatal Care     not feeling alot of movement.     18w5d  MP/MA

## 2018-11-05 NOTE — TELEPHONE ENCOUNTER
Patient is calling to report that she had been feeling fetal movement for the past 3 weeks, but in the last couple of days she has not felt any movement.  She states she has laid on her side to see if that would help, but has not had any luck.  She still feels no fetal movement.  She states she recalls feeling her other pregnancy movements at 18 weeks (almost 19 weeks), but the more concerning thing is that she was feeling baby move, and now is not.    She is informed a message will be sent to PCP to see what he would like to do from here.  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    Routing to PCP for further advice.    Sally Toussaint RN
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
## 2018-11-05 NOTE — TELEPHONE ENCOUNTER
Pt is scheduled tomorrow at 4p. Pt would like to speak to AF nurse. Has further questions. Please call cell phone.
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
## 2018-11-14 ENCOUNTER — HOSPITAL ENCOUNTER (OUTPATIENT)
Dept: ULTRASOUND IMAGING | Facility: CLINIC | Age: 33
Discharge: HOME | End: 2018-11-14
Attending: FAMILY MEDICINE | Admitting: FAMILY MEDICINE
Payer: COMMERCIAL

## 2018-11-14 DIAGNOSIS — Z34.82 ENCOUNTER FOR SUPERVISION OF OTHER NORMAL PREGNANCY IN SECOND TRIMESTER: ICD-10-CM

## 2018-11-14 PROCEDURE — 76805 OB US >/= 14 WKS SNGL FETUS: CPT

## 2018-11-27 ENCOUNTER — PRENATAL OFFICE VISIT (OUTPATIENT)
Dept: FAMILY MEDICINE | Facility: CLINIC | Age: 33
End: 2018-11-27
Payer: COMMERCIAL

## 2018-11-27 ENCOUNTER — DOCUMENTATION ONLY (OUTPATIENT)
Dept: LAB | Facility: CLINIC | Age: 33
End: 2018-11-27

## 2018-11-27 VITALS
WEIGHT: 244.8 LBS | TEMPERATURE: 97.2 F | RESPIRATION RATE: 16 BRPM | BODY MASS INDEX: 38.06 KG/M2 | HEART RATE: 117 BPM | OXYGEN SATURATION: 100 % | DIASTOLIC BLOOD PRESSURE: 72 MMHG | SYSTOLIC BLOOD PRESSURE: 110 MMHG

## 2018-11-27 DIAGNOSIS — Z34.82 ENCOUNTER FOR SUPERVISION OF OTHER NORMAL PREGNANCY IN SECOND TRIMESTER: Primary | ICD-10-CM

## 2018-11-27 PROCEDURE — 99207 ZZC PRENATAL VISIT: CPT | Performed by: FAMILY MEDICINE

## 2018-11-27 ASSESSMENT — PAIN SCALES - GENERAL: PAINLEVEL: MILD PAIN (3)

## 2018-11-27 NOTE — PROGRESS NOTES
Taking PNVs, no problems. 1 hr glucola and labs at her next visit.  Instructed on not to eat or drink anything more than water for at least 2 hours prior to the test.

## 2018-11-27 NOTE — MR AVS SNAPSHOT
After Visit Summary   11/27/2018    Leah Casarez    MRN: [id number]           Patient Information     Date Of Birth          1985        Visit Information        Provider Department      11/27/2018 3:40 PM Dwayne Campos MD [facility]         Follow-ups after your visit        Your next 10 appointments already scheduled     Dec 13, 2018 11:30 AM CST   ESTABLISHED PRENATAL with Dwayne Campos MD   [facility]    [address] [place] [id number]   [phone]            Jan 09, 2019  3:20 PM CST   ESTABLISHED PRENATAL with Dwayne Campos MD   [facility] ([facility]    [address] [place] [address]   [phone]            Feb 06, 2019  3:00 PM CST   ESTABLISHED PRENATAL with Dwayne Campos MD   [facility] ([facility]    [address] [place] [address]   [phone]            Feb 20, 2019  3:20 PM CST   ESTABLISHED PRENATAL with Dwayne Campos MD   [facility] ([facility])    [address] [place] [address]   [phone]            Mar 06, 2019  3:20 PM CST   ESTABLISHED PRENATAL with Dwayne Campos MD   [facility] ([facility]    [address] [place] [address]   [phone]            Mar 13, 2019  3:20 PM CDT   ESTABLISHED PRENATAL with Dwayne Campos MD   [facility] ([facility]    [address] [place] [address]   [phone]            Mar 20, 2019  3:20 PM CDT   ESTABLISHED PRENATAL with Dwayne Campos MD   [facility] ([facility]    [address] [place] [address]   [phone]            Mar 27, 2019  3:20 PM CDT   ESTABLISHED PRENATAL with Dwayne Campos MD   [facility] ([facility])    [address] [place] [id number] [phone]            Apr 03, 2019  3:20 PM CDT   ESTABLISHED PRENATAL with Dwayne Campos MD   [facility] ([facility]    [address] [place] [id number] [phone]              Who to contact     If you have questions or need follow up information about today's clinic visit or your schedule please contact [facility] directly at [phone].  Normal or non-critical lab and imaging results will be communicated to you by Mabayahart, letter or phone within 4 business days after the clinic has received the results. If you do not hear from us within 7 days, please contact the clinic through Conturt or phone. If you have a critical or abnormal lab result, we will notify you by phone as soon as possible.  Submit refill requests through Peerz or call your pharmacy and they will forward the refill request to us. Please allow 3 business days for your refill to be completed.          Additional Information About Your Visit        Peerz Information     Peerz gives you secure access to your electronic health record. If you see a primary care provider, you can also send messages to your care team and make appointments. If you have questions, please call your primary care clinic.  If you do not have a primary care provider, please call [phone] and they will assist you.        Care EveryWhere ID     This is your Care EveryWhere ID. This could be used by other organizations to access your [facility] medical records  [id number]        Your Vitals Were     Pulse Temperature Respirations Last Period Pulse Oximetry BMI (Body Mass Index)    117 97.2  F (36.2  C) (Temporal) 16 06/27/2018 (Approximate) 100% 38.06 kg/m2       Blood Pressure from Last 3 Encounters:   11/27/18 110/72   11/05/18 134/64   10/24/18 114/64    Weight from Last 3 Encounters:   11/27/18 244 lb 12.8 oz (111 kg)    11/05/18 240 lb (108.9 kg)   10/24/18 243 lb (110.2 kg)              Today, you had the following     No orders found for display       Primary Care Provider Office Phone # Fax #    Dwayne Campos [phone]       [address] [place] [address]        Equal Access to Services     [facility]: Hadii aad ku hadasho Soomaali, waaxda luqadaha, qaybta kaalmada adeegyada, waxay idiin hayaan adeeg kharash la'aan . So [facility] [phone].    ATENCIÓN: Si habla español, tiene a issa disposición servicios gratuitos de asistencia lingüística. Llame al [phone].    We comply with applicable federal civil rights laws and [place] laws. We do not discriminate on the basis of race, color, national origin, age, disability, sex, sexual orientation, or gender identity.            Thank you!     Thank you for choosing [facility]  for your care. Our goal is always to provide you with excellent care. Hearing back from our patients is one way we can continue to improve our services. Please take a few minutes to complete the written survey that you may receive in the mail after your visit with us. Thank you!             Your Updated Medication List - Protect others around you: Learn how to safely use, store and throw away your medicines at www.disposemymeds.org.          This list is accurate as of 11/27/18  4:11 PM.  Always use your most recent med list.                   Brand Name Dispense Instructions for use Diagnosis    fluticasone 50 MCG/ACT nasal spray    FLONASE    1 Bottle    Spray 2 sprays into both nostrils daily    Seasonal allergic rhinitis, unspecified chronicity, unspecified trigger       montelukast 10 MG tablet    SINGULAIR    90 tablet    Take 1 tablet (10 mg) by mouth At Bedtime    Seasonal allergic rhinitis, unspecified chronicity, unspecified trigger       PRENATAL VITAMIN PO      Take 1 tablet by mouth daily

## 2018-11-28 NOTE — PROGRESS NOTES
This patient did not show up for the labs that were ordered today.  I have canceled and reordered as future for one month.  Please contact the patient to come in.  Thank you! -Marina MATA, Lab

## 2018-12-13 ENCOUNTER — PRENATAL OFFICE VISIT (OUTPATIENT)
Dept: FAMILY MEDICINE | Facility: CLINIC | Age: 33
End: 2018-12-13
Payer: COMMERCIAL

## 2018-12-13 VITALS
WEIGHT: 248 LBS | RESPIRATION RATE: 18 BRPM | HEART RATE: 76 BPM | OXYGEN SATURATION: 99 % | BODY MASS INDEX: 38.55 KG/M2 | TEMPERATURE: 96.7 F | SYSTOLIC BLOOD PRESSURE: 126 MMHG | DIASTOLIC BLOOD PRESSURE: 66 MMHG

## 2018-12-13 DIAGNOSIS — Z34.82 ENCOUNTER FOR SUPERVISION OF OTHER NORMAL PREGNANCY IN SECOND TRIMESTER: Primary | ICD-10-CM

## 2018-12-13 DIAGNOSIS — O99.012 ANEMIA AFFECTING PREGNANCY IN SECOND TRIMESTER: ICD-10-CM

## 2018-12-13 LAB
GLUCOSE 1H P 50 G GLC PO SERPL-MCNC: 88 MG/DL (ref 60–129)
HGB BLD-MCNC: 10.9 G/DL (ref 11.7–15.7)

## 2018-12-13 PROCEDURE — 99207 ZZC PRENATAL VISIT: CPT | Performed by: FAMILY MEDICINE

## 2018-12-13 PROCEDURE — 86780 TREPONEMA PALLIDUM: CPT | Performed by: FAMILY MEDICINE

## 2018-12-13 PROCEDURE — 00000218 ZZHCL STATISTIC OBHBG - HEMOGLOBIN: Performed by: FAMILY MEDICINE

## 2018-12-13 PROCEDURE — 36415 COLL VENOUS BLD VENIPUNCTURE: CPT | Performed by: FAMILY MEDICINE

## 2018-12-13 PROCEDURE — 82950 GLUCOSE TEST: CPT | Performed by: FAMILY MEDICINE

## 2018-12-13 RX ORDER — FERROUS GLUCONATE 324(38)MG
324 TABLET ORAL
Qty: 100 TABLET | Refills: 1 | Status: SHIPPED | OUTPATIENT
Start: 2018-12-13 | End: 2019-02-06

## 2018-12-13 ASSESSMENT — PAIN SCALES - GENERAL: PAINLEVEL: NO PAIN (0)

## 2018-12-13 NOTE — PROGRESS NOTES
Taking PNVs, no problems. 1 hr glucola and other labs being drawn today.     Electronically signed by:  Dwayne Campos M.D.  12/13/2018

## 2018-12-14 LAB — T PALLIDUM AB SER QL: NONREACTIVE

## 2019-01-03 ENCOUNTER — TELEPHONE (OUTPATIENT)
Dept: FAMILY MEDICINE | Facility: CLINIC | Age: 34
End: 2019-01-03

## 2019-01-03 NOTE — TELEPHONE ENCOUNTER
Called patient to reschedule stephen on 3/20/2019 and 3/27/2019 as provider is out of clinic. Appts rescheduled with Dr Meredith.

## 2019-01-09 ENCOUNTER — PRENATAL OFFICE VISIT (OUTPATIENT)
Dept: FAMILY MEDICINE | Facility: CLINIC | Age: 34
End: 2019-01-09
Payer: COMMERCIAL

## 2019-01-09 VITALS
OXYGEN SATURATION: 98 % | TEMPERATURE: 96.9 F | HEIGHT: 68 IN | RESPIRATION RATE: 16 BRPM | DIASTOLIC BLOOD PRESSURE: 62 MMHG | BODY MASS INDEX: 37.59 KG/M2 | SYSTOLIC BLOOD PRESSURE: 120 MMHG | WEIGHT: 248 LBS | HEART RATE: 84 BPM

## 2019-01-09 DIAGNOSIS — Z34.83 ENCOUNTER FOR SUPERVISION OF OTHER NORMAL PREGNANCY IN THIRD TRIMESTER: Primary | ICD-10-CM

## 2019-01-09 PROCEDURE — 99207 ZZC PRENATAL VISIT: CPT | Performed by: FAMILY MEDICINE

## 2019-01-09 ASSESSMENT — MIFFLIN-ST. JEOR: SCORE: 1894.77

## 2019-01-09 ASSESSMENT — PAIN SCALES - GENERAL: PAINLEVEL: MILD PAIN (3)

## 2019-01-09 NOTE — PROGRESS NOTES
Taking PNVs, no problems.  No change in discharge, no bleeding or leaking of fluid.  She has not had any contractions and has good fetal movement.  Patient will see me in 2 weeks.    Electronically signed by:  Dwayne Campos M.D.  1/9/2019

## 2019-01-16 ENCOUNTER — MYC MEDICAL ADVICE (OUTPATIENT)
Dept: FAMILY MEDICINE | Facility: CLINIC | Age: 34
End: 2019-01-16

## 2019-01-16 DIAGNOSIS — R10.2 PELVIC PAIN IN PREGNANCY: Primary | ICD-10-CM

## 2019-01-16 DIAGNOSIS — O26.899 PELVIC PAIN IN PREGNANCY: Primary | ICD-10-CM

## 2019-01-16 NOTE — TELEPHONE ENCOUNTER
Can we place a PT order for Leah for pelvic relaxation/pain in pregnancy.  Have her seen either Edith MCKEON or Ashlyn CHUNG in the [facility] or any of the PTs here in [place] who work with women for pelvic pain in pregnancy.     Electronically signed by:  Dwayne Campos M.D.  1/16/2019

## 2019-01-22 ENCOUNTER — MYC MEDICAL ADVICE (OUTPATIENT)
Dept: FAMILY MEDICINE | Facility: CLINIC | Age: 34
End: 2019-01-22

## 2019-01-22 NOTE — TELEPHONE ENCOUNTER
Can we call him to PT to see if they can get her an SI belt for her pelvic relaxation.  She has a referral to PT but they can't get her in until the 4th of February.    Electronically signed by:  Dwayne Campos M.D.  1/22/2019

## 2019-01-23 ENCOUNTER — HOSPITAL ENCOUNTER (OUTPATIENT)
Dept: PHYSICAL THERAPY | Facility: CLINIC | Age: 34
Setting detail: THERAPIES SERIES
End: 2019-01-23
Attending: FAMILY MEDICINE
Payer: COMMERCIAL

## 2019-01-23 DIAGNOSIS — R10.2 PELVIC PAIN IN PREGNANCY: ICD-10-CM

## 2019-01-23 DIAGNOSIS — O26.899 PELVIC PAIN IN PREGNANCY: ICD-10-CM

## 2019-01-23 PROCEDURE — 97140 MANUAL THERAPY 1/> REGIONS: CPT | Mod: GP | Performed by: PHYSICAL THERAPIST

## 2019-01-23 PROCEDURE — 97162 PT EVAL MOD COMPLEX 30 MIN: CPT | Mod: GP | Performed by: PHYSICAL THERAPIST

## 2019-01-23 PROCEDURE — 97110 THERAPEUTIC EXERCISES: CPT | Mod: GP | Performed by: PHYSICAL THERAPIST

## 2019-01-23 NOTE — PROGRESS NOTES
" 01/23/19 0821   General Information   Type of Visit Initial OP Ortho PT Evaluation   Start of Care Date 01/23/19   Referring Physician Dr. Ness   Patient/Family Goals Statement The pt would like to have less pain where it is managable.   Orders Evaluate and Treat   Date of Order 01/22/19   Insurance Type Beat.no   Medical Diagnosis Pelvic pain in pregnancy O26.899, R10.2  - Primary   Surgical/Medical history reviewed Yes   Precautions/Limitations no known precautions/limitations   Weight-Bearing Status - LUE full weight-bearing   Weight-Bearing Status - RUE full weight-bearing   Weight-Bearing Status - LLE full weight-bearing   Weight-Bearing Status - RLE full weight-bearing       Present No   Body Part(s)   Body Part(s) Lumbar Spine/SI;Pelvic Floor Dysfunction   Presentation and Etiology   Pertinent history of current problem (include personal factors and/or comorbidities that impact the POC) The pt notes that she has pain in pelvis, hips, low back and down front of both of her legs occasionally. She reports that sometimes after sitting for 5 minutes her L leg drags when she tries to walk. She notes that her pelvic region feels like \"shattering' and her hip and low back pain is sharp. She is 30 weeks gestation and she had a more mild version of with her other two pregnancies of these same symptoms. The pt is a special . Pt note she is having pain when  legs, going up and downs stairs, rolling in bed, and getting in and out of a car. The pt notes that she does have stress incontinence with this pregnancy. She notes that she is urinating more frequently than what she was with her previous pregnancies. She has a 3.5 year old and a 1 year old at home.   Impairments A. Pain;B. Decreased WB tolerance;D. Decreased ROM;E. Decreased flexibility;F. Decreased strength and endurance;H. Impaired gait;L. Tingling   Functional Limitations perform activities of daily " living;perform required work activities   Symptom Location pelvis, hips, low back   How/Where did it occur (pregnancy)   Onset date of current episode/exacerbation 09/23/18   Pain rating (0-10 point scale) Best (/10);Worst (/10);Other   Best (/10) current: 3-4/10   Worst (/10) 9-10/10,   Pain quality A. Sharp  (shattering)   Frequency of pain/symptoms A. Constant   Pain/symptoms are: Worse during the day   Pain/symptoms exacerbated by A. Sitting;B. Walking;I. Bending;J. ADL;K. Home tasks;L. Work tasks   Pain/symptoms eased by D. Nothing   Progression of symptoms since onset: Worsened   Prior Level of Function   Prior Level of Function-Mobility IND   Prior Level of Function-ADLs IND   Current Level of Function   Current Community Support Family/friend caregiver   Patient role/employment history A. Employed   Employment Comments special    [facility]   Home/community accessibility 2 flights inside house   Current equipment-Gait/Locomotion None   Current equipment-ADL None   Fall Risk Screen   Fall screen completed by PT   Have you fallen 2 or more times in the past year? No   Have you fallen and had an injury in the past year? No   Is patient a fall risk? No   Abuse Screen (yes response referral indicated)   Feels Unsafe at Home or Work/School no   Lumbar Spine/SI Objective Findings   Posture In standing pt demonstrates increased lumbar lordosis, R ASIS higher than L ASIS in supine   Gait/Locomotion decreased stance time on L LE, decreased step length on L LE, R side of pubic symphysis slightly elevated when compared to L side   Pelvic Screen negative with standing marches, positive SI special tests: distraction, [place], thigh thrust and, STEFFI   Hip Screen limited HIP IR B, pain with assessment on L side, negative scour on L LE   Lumbar/Hip/Knee/Foot Strength Comments Attempted hip abduction MMT, however pt was unable to perform due to increasing pain   Piriformis Flexibility  demonstrates tightness   SLR negative B   Palpation Tenderness and tightness to palpation along pubic symphysis (R>L), L glutes, piriformis, and QL   Specific Questions   Specific Questions Pelvic Floor Dysfunction;Pregnancy;Women's Health   Women's Health Questions   Number of pregnancies  3   Planned Therapy Interventions   Planned Therapy Interventions ADL retraining;balance training;bed mobility training;gait training;joint mobilization;manual therapy;neuromuscular re-education;ROM;strengthening;stretching;transfer training   Planned Modality Interventions   Planned Modality Interventions Cryotherapy  (as needed for symptom management)   Clinical Impression   Criteria for Skilled Therapeutic Interventions Met yes, treatment indicated   PT Diagnosis Pain, malalignment of pelvis due to muscle tissue strength/length imbalances, decreased ROM, decreased flexibility,decreased strength   Influenced by the following impairments pregnancy, muscle and soft tissue length/strength imbalances   Functional limitations due to impairments ambulation, sitting, walking, getting in and out of cars   Clinical Presentation Evolving/Changing   Clinical Presentation Rationale PT is a 33 year old female that presents to physical therapy 30 weeks gestation with muscle and ligament strength/length imbalances which are contributing to malalignment of pelvis, which is resulting in pain with functional mobility and ADLs. Pt has a history of dislocation of pubic symphysis during her previous pregnancies. She will continue to benefit from skilled physical therapy to address impairments listed above and to reduce pain throughout the rest of her pregnancy.   Clinical Decision Making (Complexity) Moderate complexity   Therapy Frequency 1 time/week   Predicted Duration of Therapy Intervention (days/wks) 10 weeks   Risk & Benefits of therapy have been explained Yes   Patient, Family & other staff in agreement with plan of care Yes   Education  Assessment   Preferred Learning Style Listening;Demonstration;Reading;Pictures/video   Barriers to Learning No barriers   ORTHO GOALS   PT Ortho Eval Goals 1;2   Ortho Goal 1   Goal Identifier HEP   Goal Description Pt will be consistent with performing HEP 3x a week to assist with reducing pain with functional mobility.   Target Date 04/02/19   Ortho Goal 2   Goal Identifier sitting   Goal Description Pt will be able to sit for 30 minutes and stand up with no greater than 4/10 pain.   Target Date 04/02/19   Total Evaluation Time   PT Eval, Moderate Complexity Minutes (58262) 30     Thank you for your referral.    Tesha Falk, PT, DTP  [facility]  [phone]

## 2019-01-30 ENCOUNTER — HOSPITAL ENCOUNTER (OUTPATIENT)
Dept: PHYSICAL THERAPY | Facility: CLINIC | Age: 34
Setting detail: THERAPIES SERIES
End: 2019-01-30
Attending: FAMILY MEDICINE
Payer: COMMERCIAL

## 2019-01-30 PROCEDURE — 97140 MANUAL THERAPY 1/> REGIONS: CPT | Mod: GP | Performed by: PHYSICAL THERAPIST

## 2019-01-30 PROCEDURE — 97110 THERAPEUTIC EXERCISES: CPT | Mod: GP | Performed by: PHYSICAL THERAPIST

## 2019-02-06 ENCOUNTER — PRENATAL OFFICE VISIT (OUTPATIENT)
Dept: FAMILY MEDICINE | Facility: CLINIC | Age: 34
End: 2019-02-06
Payer: COMMERCIAL

## 2019-02-06 VITALS
SYSTOLIC BLOOD PRESSURE: 130 MMHG | OXYGEN SATURATION: 100 % | BODY MASS INDEX: 38.47 KG/M2 | RESPIRATION RATE: 16 BRPM | WEIGHT: 256 LBS | DIASTOLIC BLOOD PRESSURE: 80 MMHG | TEMPERATURE: 96.8 F | HEART RATE: 90 BPM

## 2019-02-06 DIAGNOSIS — N81.89 PELVIC RELAXATION DISORDER: ICD-10-CM

## 2019-02-06 DIAGNOSIS — Z34.83 ENCOUNTER FOR SUPERVISION OF OTHER NORMAL PREGNANCY IN THIRD TRIMESTER: Primary | ICD-10-CM

## 2019-02-06 PROCEDURE — 99207 ZZC PRENATAL VISIT: CPT | Performed by: FAMILY MEDICINE

## 2019-02-06 ASSESSMENT — PAIN SCALES - GENERAL: PAINLEVEL: SEVERE PAIN (7)

## 2019-02-06 NOTE — PROGRESS NOTES
Taking PNVs. Reports severe pelvic pain. Has been seeing PT and wearing a support belt without much relief. Also reports heartburn, and recommend she take Tums. No contractions. No fluid leaking or vaginal bleeding. Feeling good movement. Patient will see me in 2 weeks.    Patient was seen and examined by myself and Dr. Campos. The note was then scribed by me.     Irene Roxanne, MS3  February 6, 2019    I agree with the PFSH and ROS as completed by the MS.  The remainder of the encounter was performed by me and scribed by the MS.  The scribed note accurately reflects my personal services and the decisions made by me.     Electronically signed by:  Dwayne Campos M.D.  2/6/2019

## 2019-02-20 ENCOUNTER — PRENATAL OFFICE VISIT (OUTPATIENT)
Dept: FAMILY MEDICINE | Facility: CLINIC | Age: 34
End: 2019-02-20
Payer: COMMERCIAL

## 2019-02-20 VITALS
BODY MASS INDEX: 38.17 KG/M2 | OXYGEN SATURATION: 97 % | RESPIRATION RATE: 18 BRPM | SYSTOLIC BLOOD PRESSURE: 112 MMHG | DIASTOLIC BLOOD PRESSURE: 60 MMHG | WEIGHT: 254 LBS | TEMPERATURE: 97.5 F | HEART RATE: 82 BPM

## 2019-02-20 DIAGNOSIS — Z34.83 ENCOUNTER FOR SUPERVISION OF OTHER NORMAL PREGNANCY IN THIRD TRIMESTER: Primary | ICD-10-CM

## 2019-02-20 PROCEDURE — 99207 ZZC PRENATAL VISIT: CPT | Performed by: FAMILY MEDICINE

## 2019-02-20 ASSESSMENT — PAIN SCALES - GENERAL: PAINLEVEL: NO PAIN (0)

## 2019-02-20 NOTE — PROGRESS NOTES
Taking PNVs, no problems.  Pelvic relaxation waxes and wanes.   No change in discharge, no bleeding or leaking of fluid.  She has not had any contractions and has good fetal movement.

## 2019-03-06 ENCOUNTER — PRENATAL OFFICE VISIT (OUTPATIENT)
Dept: FAMILY MEDICINE | Facility: CLINIC | Age: 34
End: 2019-03-06
Payer: COMMERCIAL

## 2019-03-06 VITALS
RESPIRATION RATE: 18 BRPM | DIASTOLIC BLOOD PRESSURE: 74 MMHG | OXYGEN SATURATION: 97 % | HEART RATE: 84 BPM | WEIGHT: 261 LBS | TEMPERATURE: 97.4 F | SYSTOLIC BLOOD PRESSURE: 114 MMHG | BODY MASS INDEX: 39.22 KG/M2

## 2019-03-06 DIAGNOSIS — Z34.83 ENCOUNTER FOR SUPERVISION OF OTHER NORMAL PREGNANCY IN THIRD TRIMESTER: Primary | ICD-10-CM

## 2019-03-06 LAB — HGB BLD-MCNC: 10.4 G/DL (ref 11.7–15.7)

## 2019-03-06 PROCEDURE — 99207 ZZC PRENATAL VISIT: CPT | Performed by: FAMILY MEDICINE

## 2019-03-06 PROCEDURE — 36415 COLL VENOUS BLD VENIPUNCTURE: CPT | Performed by: FAMILY MEDICINE

## 2019-03-06 PROCEDURE — 00000218 ZZHCL STATISTIC OBHBG - HEMOGLOBIN: Performed by: FAMILY MEDICINE

## 2019-03-06 PROCEDURE — 87653 STREP B DNA AMP PROBE: CPT | Performed by: FAMILY MEDICINE

## 2019-03-06 PROCEDURE — 0064U ANTB TP TOTAL&RPR IA QUAL: CPT | Performed by: FAMILY MEDICINE

## 2019-03-06 ASSESSMENT — PAIN SCALES - GENERAL: PAINLEVEL: SEVERE PAIN (7)

## 2019-03-07 LAB
GP B STREP DNA SPEC QL NAA+PROBE: NEGATIVE
SPECIMEN SOURCE: NORMAL
T PALLIDUM AB SER QL: NONREACTIVE

## 2019-03-13 ENCOUNTER — PRENATAL OFFICE VISIT (OUTPATIENT)
Dept: FAMILY MEDICINE | Facility: CLINIC | Age: 34
End: 2019-03-13
Payer: COMMERCIAL

## 2019-03-13 VITALS
DIASTOLIC BLOOD PRESSURE: 60 MMHG | SYSTOLIC BLOOD PRESSURE: 118 MMHG | HEART RATE: 110 BPM | OXYGEN SATURATION: 100 % | BODY MASS INDEX: 39.19 KG/M2 | RESPIRATION RATE: 18 BRPM | WEIGHT: 260.8 LBS | TEMPERATURE: 96.5 F

## 2019-03-13 DIAGNOSIS — Z34.83 ENCOUNTER FOR SUPERVISION OF OTHER NORMAL PREGNANCY IN THIRD TRIMESTER: Primary | ICD-10-CM

## 2019-03-13 PROCEDURE — 99207 ZZC PRENATAL VISIT: CPT | Performed by: FAMILY MEDICINE

## 2019-03-13 ASSESSMENT — PAIN SCALES - GENERAL: PAINLEVEL: NO PAIN (0)

## 2019-03-13 NOTE — PROGRESS NOTES
Taking PNVs, no problems.  No change in discharge other than a little more mucus, no bleeding or leaking of fluid.  She has not had any contractions and has good fetal movement.  Signs and symptoms of labor were reviewed.      Electronically signed by:  Dwayne Campos M.D.  3/13/2019

## 2019-03-18 NOTE — ADDENDUM NOTE
Encounter addended by: Tesha Falk, PT on: 3/18/2019 3:36 PM   Actions taken: Sign clinical note, Episode resolved

## 2019-03-18 NOTE — PROGRESS NOTES
Outpatient Physical Therapy Discharge Note     Patient: Leah Casarez  : 1985    Beginning/End Dates of Reporting Period:  2019 to 2019    Referring Provider: Dr. Ness    Therapy Diagnosis: Pain, malalignment of pelvis due to muscle tissue strength/length imbalances, decreased ROM, decreased flexibility,decreased strength     Client Self Report: The pt got a belt on Thursday night. ON friday after she took her belt off she went from a mild ach to a lot of pain. She has been wearing her SI belt daily. She forgot her belt this afternoon. She did a lot of cleaning today and notes that it has been getting a little more aggravated. The  pt notes that she has moer shooting pain in her lower back and glutes.    Objective Measurements:  Objective Measure: symptoms  Details: low back pain, and left glute greater than right side.       Goals:  Goal Identifier HEP   Goal Description Pt will be consistent with performing HEP 3x a week to assist with reducing pain with functional mobility.   Target Date 19   Date Met      Progress: Not assessed. Pt did not schedule future appointments.     Goal Identifier sitting   Goal Description Pt will be able to sit for 30 minutes and stand up with no greater than 4/10 pain.   Target Date 19   Date Met      Progress: Not assessed. Pt did not schedule future appointments.         Progress Toward Goals:   Not assessed this period.          Plan:  Discharge from therapy.    Discharge:    Reason for Discharge: Patient has failed to schedule further appointments.    Equipment Issued: n/a    Discharge Plan: Patient to continue home program.    Thank you for your referral.    Tesha Falk, PT, DTP  [facility]  [phone]

## 2019-03-22 ENCOUNTER — PRENATAL OFFICE VISIT (OUTPATIENT)
Dept: FAMILY MEDICINE | Facility: CLINIC | Age: 34
End: 2019-03-22
Payer: COMMERCIAL

## 2019-03-22 VITALS
HEART RATE: 109 BPM | OXYGEN SATURATION: 99 % | SYSTOLIC BLOOD PRESSURE: 116 MMHG | DIASTOLIC BLOOD PRESSURE: 64 MMHG | TEMPERATURE: 96.9 F | RESPIRATION RATE: 16 BRPM | BODY MASS INDEX: 38.56 KG/M2 | WEIGHT: 256.6 LBS

## 2019-03-22 DIAGNOSIS — Z34.83 ENCOUNTER FOR SUPERVISION OF OTHER NORMAL PREGNANCY IN THIRD TRIMESTER: Primary | ICD-10-CM

## 2019-03-22 PROBLEM — Z30.41 ORAL CONTRACEPTIVE PILL SURVEILLANCE: Status: RESOLVED | Noted: 2018-02-14 | Resolved: 2019-03-22

## 2019-03-22 PROCEDURE — 99207 ZZC PRENATAL VISIT: CPT | Performed by: FAMILY MEDICINE

## 2019-03-22 ASSESSMENT — PAIN SCALES - GENERAL: PAINLEVEL: MILD PAIN (3)

## 2019-03-22 NOTE — PROGRESS NOTES
Taking PNVs, no concerns. No change in discharge, still has some mucus like discharge. No vaginal bleeding or fluid leaking. Has not had any contractions. Good fetal movement. Reports some headaches, has trace swelling. No changes in vision.    Unable to reach cervix on exam today. Baby's head felt very anterior above symphysis, no pressure on posterior cervix.     Next appointment in 1 week.    Discussed warning signs for preeclampsia.  Will f/u sooner with any concern for decreased fetal movement, vaginal bleeding, fluid leak, headache, vision change, severe nausea or vomiting, abdominal pain, increased edema or other concerns.     Patient was seen and examined by myself and Dr. Meredith. The note was then scribed by me.     Irene Oliveira, MS3  Pt was personally seen, interviewed and examined by me, chart notes edited and I agree with assessment as documented above.   Tesha Meredith MD   March 22, 2019

## 2019-03-27 ENCOUNTER — PRENATAL OFFICE VISIT (OUTPATIENT)
Dept: FAMILY MEDICINE | Facility: CLINIC | Age: 34
End: 2019-03-27
Payer: COMMERCIAL

## 2019-03-27 VITALS
OXYGEN SATURATION: 99 % | WEIGHT: 261 LBS | BODY MASS INDEX: 39.22 KG/M2 | TEMPERATURE: 97.5 F | HEART RATE: 96 BPM | DIASTOLIC BLOOD PRESSURE: 66 MMHG | RESPIRATION RATE: 18 BRPM | SYSTOLIC BLOOD PRESSURE: 122 MMHG

## 2019-03-27 DIAGNOSIS — Z34.83 ENCOUNTER FOR SUPERVISION OF OTHER NORMAL PREGNANCY IN THIRD TRIMESTER: Primary | ICD-10-CM

## 2019-03-27 PROCEDURE — 99207 ZZC PRENATAL VISIT: CPT | Performed by: FAMILY MEDICINE

## 2019-03-27 ASSESSMENT — PAIN SCALES - GENERAL: PAINLEVEL: NO PAIN (0)

## 2019-03-27 NOTE — PROGRESS NOTES
Doing well overall.  No bleeding, no regular ctx. No LOF, just ongoing intermittent vaginal discharge, no change.   On exam today, baby has dropped back down, head is low but cervix is posterior and difficult to reach. I offered to do vigorous exam and try to strip membranes, she declines.    Discussed when to present for signs and symptoms of labor.   Discussed warning signs for preeclampsia.  Will f/u in 1 week(s) or sooner with any concern for decreased fetal movement, vaginal bleeding, fluid leak, headache, vision change, severe nausea or vomiting, abdominal pain, increased edema or other concerns.   Tesha Meredith MD

## 2019-04-01 ENCOUNTER — PRENATAL OFFICE VISIT (OUTPATIENT)
Dept: FAMILY MEDICINE | Facility: CLINIC | Age: 34
End: 2019-04-01
Payer: COMMERCIAL

## 2019-04-01 ENCOUNTER — MYC MEDICAL ADVICE (OUTPATIENT)
Dept: FAMILY MEDICINE | Facility: CLINIC | Age: 34
End: 2019-04-01

## 2019-04-01 VITALS
SYSTOLIC BLOOD PRESSURE: 118 MMHG | TEMPERATURE: 97 F | BODY MASS INDEX: 38.92 KG/M2 | OXYGEN SATURATION: 99 % | HEART RATE: 84 BPM | RESPIRATION RATE: 18 BRPM | DIASTOLIC BLOOD PRESSURE: 80 MMHG | WEIGHT: 259 LBS

## 2019-04-01 DIAGNOSIS — Z34.83 ENCOUNTER FOR SUPERVISION OF OTHER NORMAL PREGNANCY IN THIRD TRIMESTER: Primary | ICD-10-CM

## 2019-04-01 PROCEDURE — 99207 ZZC PRENATAL VISIT: CPT | Performed by: FAMILY MEDICINE

## 2019-04-01 ASSESSMENT — PAIN SCALES - GENERAL: PAINLEVEL: SEVERE PAIN (6)

## 2019-04-01 NOTE — PROGRESS NOTES
Taking PNVs, no problems. Having some irregular pains but nothing regular with pelvic relaxation.  Discussed elective induction for tomorrow.  The patient has a Gonzales score of 8 which is very favorable for Pitocin induction.  We discussed that this does not increase her risk at all and outcomes are unchanged whether we do an induction with Pitocin versus wait for labor to onset on its own.  She is aware that upwards of 30% of women have a  due to 1 of 4 reasons including failure to dilate, failure to descend, now presentation, or fetal intolerance of labor.  Because she is had 2 successful vaginal deliveries without complication, I believe her risks are lower than the normal labor patient and that we would have a excellent chance for a successful induction.  She had 2 spontaneous labor is right around her due date with her other 2 deliveries.  She is 39-6/7 weeks gestation tomorrow on the date we would want to induce.  She will have this discussion with her  and get back to me tonight or tomorrow morning by calling labor and delivery and confirming induction or not.    Signs and symptoms of labor were reviewed.      Electronically signed by:  Dwayne Campos M.D.  2019

## 2019-04-01 NOTE — TELEPHONE ENCOUNTER
Called patient and left message to call the clinic back.  TIMMY/MA      Have her come tonight for an appt.  BERNICE/MA

## 2019-04-02 ENCOUNTER — ANESTHESIA (OUTPATIENT)
Dept: OBGYN | Facility: CLINIC | Age: 34
End: 2019-04-02
Payer: COMMERCIAL

## 2019-04-02 ENCOUNTER — ANESTHESIA EVENT (OUTPATIENT)
Dept: OBGYN | Facility: CLINIC | Age: 34
End: 2019-04-02
Payer: COMMERCIAL

## 2019-04-02 ENCOUNTER — HOSPITAL ENCOUNTER (INPATIENT)
Facility: CLINIC | Age: 34
LOS: 2 days | Discharge: HOME | End: 2019-04-04
Attending: FAMILY MEDICINE | Admitting: FAMILY MEDICINE
Payer: COMMERCIAL

## 2019-04-02 PROBLEM — Z34.90 PREGNANCY: Status: ACTIVE | Noted: 2019-04-02

## 2019-04-02 LAB
ABO + RH BLD: NORMAL
ABO + RH BLD: NORMAL
BLD GP AB SCN SERPL QL: NORMAL
BLOOD BANK CMNT PATIENT-IMP: NORMAL
SPECIMEN EXP DATE BLD: NORMAL

## 2019-04-02 PROCEDURE — 25000125 ZZHC RX 250: Performed by: FAMILY MEDICINE

## 2019-04-02 PROCEDURE — 27110038 ZZH RX 271

## 2019-04-02 PROCEDURE — 25000128 H RX IP 250 OP 636

## 2019-04-02 PROCEDURE — 10907ZC DRAINAGE OF AMNIOTIC FLUID, THERAPEUTIC FROM PRODUCTS OF CONCEPTION, VIA NATURAL OR ARTIFICIAL OPENING: ICD-10-PCS | Performed by: FAMILY MEDICINE

## 2019-04-02 PROCEDURE — 25800030 ZZH RX IP 258 OP 636

## 2019-04-02 PROCEDURE — 25800030 ZZH RX IP 258 OP 636: Performed by: FAMILY MEDICINE

## 2019-04-02 PROCEDURE — 00HU33Z INSERTION OF INFUSION DEVICE INTO SPINAL CANAL, PERCUTANEOUS APPROACH: ICD-10-PCS | Performed by: NURSE ANESTHETIST, CERTIFIED REGISTERED

## 2019-04-02 PROCEDURE — 25000125 ZZHC RX 250: Performed by: NURSE ANESTHETIST, CERTIFIED REGISTERED

## 2019-04-02 PROCEDURE — 37000011 ZZH ANESTHESIA WARD SERVICE: Performed by: NURSE ANESTHETIST, CERTIFIED REGISTERED

## 2019-04-02 PROCEDURE — 86901 BLOOD TYPING SEROLOGIC RH(D): CPT | Performed by: FAMILY MEDICINE

## 2019-04-02 PROCEDURE — 86850 RBC ANTIBODY SCREEN: CPT | Performed by: FAMILY MEDICINE

## 2019-04-02 PROCEDURE — 25000132 ZZH RX MED GY IP 250 OP 250 PS 637: Performed by: FAMILY MEDICINE

## 2019-04-02 PROCEDURE — 3E0R3BZ INTRODUCTION OF ANESTHETIC AGENT INTO SPINAL CANAL, PERCUTANEOUS APPROACH: ICD-10-PCS | Performed by: NURSE ANESTHETIST, CERTIFIED REGISTERED

## 2019-04-02 PROCEDURE — 72200001 ZZH LABOR CARE VAGINAL DELIVERY SINGLE

## 2019-04-02 PROCEDURE — 59400 OBSTETRICAL CARE: CPT | Performed by: FAMILY MEDICINE

## 2019-04-02 PROCEDURE — 12000000 ZZH R&B MED SURG/OB

## 2019-04-02 PROCEDURE — 0KQM0ZZ REPAIR PERINEUM MUSCLE, OPEN APPROACH: ICD-10-PCS | Performed by: FAMILY MEDICINE

## 2019-04-02 PROCEDURE — 86900 BLOOD TYPING SEROLOGIC ABO: CPT | Performed by: FAMILY MEDICINE

## 2019-04-02 PROCEDURE — 40000671 ZZH STATISTIC ANESTHESIA CASE

## 2019-04-02 PROCEDURE — 25000128 H RX IP 250 OP 636: Performed by: NURSE ANESTHETIST, CERTIFIED REGISTERED

## 2019-04-02 PROCEDURE — 25000125 ZZHC RX 250

## 2019-04-02 RX ORDER — CARBOPROST TROMETHAMINE 250 UG/ML
250 INJECTION, SOLUTION INTRAMUSCULAR
Status: DISCONTINUED | OUTPATIENT
Start: 2019-04-02 | End: 2019-04-02

## 2019-04-02 RX ORDER — AMOXICILLIN 250 MG
2 CAPSULE ORAL 2 TIMES DAILY
Status: DISCONTINUED | OUTPATIENT
Start: 2019-04-02 | End: 2019-04-04 | Stop reason: HOSPADM

## 2019-04-02 RX ORDER — OXYTOCIN 10 [USP'U]/ML
10 INJECTION, SOLUTION INTRAMUSCULAR; INTRAVENOUS
Status: DISCONTINUED | OUTPATIENT
Start: 2019-04-02 | End: 2019-04-02

## 2019-04-02 RX ORDER — FENTANYL CITRATE 50 UG/ML
50-100 INJECTION, SOLUTION INTRAMUSCULAR; INTRAVENOUS
Status: DISCONTINUED | OUTPATIENT
Start: 2019-04-02 | End: 2019-04-02

## 2019-04-02 RX ORDER — NALOXONE HYDROCHLORIDE 0.4 MG/ML
.1-.4 INJECTION, SOLUTION INTRAMUSCULAR; INTRAVENOUS; SUBCUTANEOUS
Status: DISCONTINUED | OUTPATIENT
Start: 2019-04-02 | End: 2019-04-02

## 2019-04-02 RX ORDER — OXYTOCIN/0.9 % SODIUM CHLORIDE 30/500 ML
340 PLASTIC BAG, INJECTION (ML) INTRAVENOUS CONTINUOUS PRN
Status: DISCONTINUED | OUTPATIENT
Start: 2019-04-02 | End: 2019-04-04 | Stop reason: HOSPADM

## 2019-04-02 RX ORDER — OXYTOCIN/0.9 % SODIUM CHLORIDE 30/500 ML
100-340 PLASTIC BAG, INJECTION (ML) INTRAVENOUS CONTINUOUS PRN
Status: DISCONTINUED | OUTPATIENT
Start: 2019-04-02 | End: 2019-04-02

## 2019-04-02 RX ORDER — IBUPROFEN 800 MG/1
800 TABLET, FILM COATED ORAL
Status: DISCONTINUED | OUTPATIENT
Start: 2019-04-02 | End: 2019-04-02

## 2019-04-02 RX ORDER — LANOLIN 100 %
OINTMENT (GRAM) TOPICAL
Status: DISCONTINUED | OUTPATIENT
Start: 2019-04-02 | End: 2019-04-04 | Stop reason: HOSPADM

## 2019-04-02 RX ORDER — BISACODYL 10 MG
10 SUPPOSITORY, RECTAL RECTAL DAILY PRN
Status: DISCONTINUED | OUTPATIENT
Start: 2019-04-04 | End: 2019-04-04 | Stop reason: HOSPADM

## 2019-04-02 RX ORDER — OXYTOCIN/0.9 % SODIUM CHLORIDE 30/500 ML
100 PLASTIC BAG, INJECTION (ML) INTRAVENOUS CONTINUOUS
Status: DISCONTINUED | OUTPATIENT
Start: 2019-04-02 | End: 2019-04-04 | Stop reason: HOSPADM

## 2019-04-02 RX ORDER — LIDOCAINE HYDROCHLORIDE 10 MG/ML
INJECTION, SOLUTION EPIDURAL; INFILTRATION; INTRACAUDAL; PERINEURAL
Status: COMPLETED
Start: 2019-04-02 | End: 2019-04-02

## 2019-04-02 RX ORDER — AMOXICILLIN 250 MG
1 CAPSULE ORAL 2 TIMES DAILY
Status: DISCONTINUED | OUTPATIENT
Start: 2019-04-02 | End: 2019-04-04 | Stop reason: HOSPADM

## 2019-04-02 RX ORDER — NALBUPHINE HYDROCHLORIDE 10 MG/ML
2.5-5 INJECTION, SOLUTION INTRAMUSCULAR; INTRAVENOUS; SUBCUTANEOUS EVERY 6 HOURS PRN
Status: DISCONTINUED | OUTPATIENT
Start: 2019-04-02 | End: 2019-04-02

## 2019-04-02 RX ORDER — FENTANYL/BUPIVACAINE/NS/PF 2-1250MCG
PLASTIC BAG, INJECTION (ML) INJECTION
Status: COMPLETED
Start: 2019-04-02 | End: 2019-04-02

## 2019-04-02 RX ORDER — METHYLERGONOVINE MALEATE 0.2 MG/ML
200 INJECTION INTRAVENOUS
Status: DISCONTINUED | OUTPATIENT
Start: 2019-04-02 | End: 2019-04-02

## 2019-04-02 RX ORDER — OXYTOCIN 10 [USP'U]/ML
10 INJECTION, SOLUTION INTRAMUSCULAR; INTRAVENOUS
Status: DISCONTINUED | OUTPATIENT
Start: 2019-04-02 | End: 2019-04-04 | Stop reason: HOSPADM

## 2019-04-02 RX ORDER — OXYCODONE AND ACETAMINOPHEN 5; 325 MG/1; MG/1
1 TABLET ORAL
Status: DISCONTINUED | OUTPATIENT
Start: 2019-04-02 | End: 2019-04-02

## 2019-04-02 RX ORDER — OXYTOCIN/0.9 % SODIUM CHLORIDE 30/500 ML
1-24 PLASTIC BAG, INJECTION (ML) INTRAVENOUS CONTINUOUS
Status: DISCONTINUED | OUTPATIENT
Start: 2019-04-02 | End: 2019-04-02

## 2019-04-02 RX ORDER — NALOXONE HYDROCHLORIDE 0.4 MG/ML
.1-.4 INJECTION, SOLUTION INTRAMUSCULAR; INTRAVENOUS; SUBCUTANEOUS
Status: DISCONTINUED | OUTPATIENT
Start: 2019-04-02 | End: 2019-04-04 | Stop reason: HOSPADM

## 2019-04-02 RX ORDER — LIDOCAINE HYDROCHLORIDE 10 MG/ML
1 INJECTION, SOLUTION EPIDURAL; INFILTRATION; INTRACAUDAL; PERINEURAL ONCE
Status: COMPLETED | OUTPATIENT
Start: 2019-04-02 | End: 2019-04-02

## 2019-04-02 RX ORDER — HYDROCORTISONE 2.5 %
CREAM (GRAM) TOPICAL 3 TIMES DAILY PRN
Status: DISCONTINUED | OUTPATIENT
Start: 2019-04-02 | End: 2019-04-04 | Stop reason: HOSPADM

## 2019-04-02 RX ORDER — LIDOCAINE 40 MG/G
CREAM TOPICAL
Status: DISCONTINUED | OUTPATIENT
Start: 2019-04-02 | End: 2019-04-02

## 2019-04-02 RX ORDER — OXYCODONE HYDROCHLORIDE 5 MG/1
5 TABLET ORAL EVERY 4 HOURS PRN
Status: DISCONTINUED | OUTPATIENT
Start: 2019-04-02 | End: 2019-04-04 | Stop reason: HOSPADM

## 2019-04-02 RX ORDER — LIDOCAINE HYDROCHLORIDE AND EPINEPHRINE 15; 5 MG/ML; UG/ML
INJECTION, SOLUTION EPIDURAL PRN
Status: DISCONTINUED | OUTPATIENT
Start: 2019-04-02 | End: 2019-04-02

## 2019-04-02 RX ORDER — BUPIVACAINE HYDROCHLORIDE 2.5 MG/ML
INJECTION, SOLUTION INFILTRATION; PERINEURAL PRN
Status: DISCONTINUED | OUTPATIENT
Start: 2019-04-02 | End: 2019-04-02

## 2019-04-02 RX ORDER — IBUPROFEN 800 MG/1
800 TABLET, FILM COATED ORAL EVERY 6 HOURS PRN
Status: DISCONTINUED | OUTPATIENT
Start: 2019-04-02 | End: 2019-04-04 | Stop reason: HOSPADM

## 2019-04-02 RX ORDER — ACETAMINOPHEN 325 MG/1
650 TABLET ORAL EVERY 4 HOURS PRN
Status: DISCONTINUED | OUTPATIENT
Start: 2019-04-02 | End: 2019-04-04 | Stop reason: HOSPADM

## 2019-04-02 RX ORDER — EPHEDRINE SULFATE 50 MG/ML
5 INJECTION, SOLUTION INTRAMUSCULAR; INTRAVENOUS; SUBCUTANEOUS
Status: DISCONTINUED | OUTPATIENT
Start: 2019-04-02 | End: 2019-04-02

## 2019-04-02 RX ORDER — ONDANSETRON 2 MG/ML
4 INJECTION INTRAMUSCULAR; INTRAVENOUS EVERY 6 HOURS PRN
Status: DISCONTINUED | OUTPATIENT
Start: 2019-04-02 | End: 2019-04-02

## 2019-04-02 RX ORDER — SODIUM CHLORIDE, SODIUM LACTATE, POTASSIUM CHLORIDE, CALCIUM CHLORIDE 600; 310; 30; 20 MG/100ML; MG/100ML; MG/100ML; MG/100ML
INJECTION, SOLUTION INTRAVENOUS CONTINUOUS
Status: DISCONTINUED | OUTPATIENT
Start: 2019-04-02 | End: 2019-04-02

## 2019-04-02 RX ORDER — ACETAMINOPHEN 325 MG/1
650 TABLET ORAL EVERY 4 HOURS PRN
Status: DISCONTINUED | OUTPATIENT
Start: 2019-04-02 | End: 2019-04-02

## 2019-04-02 RX ADMIN — OXYTOCIN-SODIUM CHLORIDE 0.9% IV SOLN 30 UNIT/500ML 2 MILLI-UNITS/MIN: 30-0.9/5 SOLUTION at 14:18

## 2019-04-02 RX ADMIN — Medication: at 18:19

## 2019-04-02 RX ADMIN — BUPIVACAINE HYDROCHLORIDE 10 ML: 2.5 INJECTION, SOLUTION EPIDURAL; INFILTRATION; INTRACAUDAL; PERINEURAL at 18:12

## 2019-04-02 RX ADMIN — Medication 10 ML/HR: at 18:21

## 2019-04-02 RX ADMIN — SODIUM CHLORIDE, POTASSIUM CHLORIDE, SODIUM LACTATE AND CALCIUM CHLORIDE: 600; 310; 30; 20 INJECTION, SOLUTION INTRAVENOUS at 18:01

## 2019-04-02 RX ADMIN — LIDOCAINE HYDROCHLORIDE 1 ML: 10 INJECTION, SOLUTION EPIDURAL; INFILTRATION; INTRACAUDAL; PERINEURAL at 13:58

## 2019-04-02 RX ADMIN — SODIUM CHLORIDE, POTASSIUM CHLORIDE, SODIUM LACTATE AND CALCIUM CHLORIDE: 600; 310; 30; 20 INJECTION, SOLUTION INTRAVENOUS at 14:18

## 2019-04-02 RX ADMIN — SENNOSIDES AND DOCUSATE SODIUM 1 TABLET: 8.6; 5 TABLET ORAL at 22:53

## 2019-04-02 RX ADMIN — LIDOCAINE HYDROCHLORIDE,EPINEPHRINE BITARTRATE 4 ML: 15; .005 INJECTION, SOLUTION EPIDURAL; INFILTRATION; INTRACAUDAL; PERINEURAL at 18:05

## 2019-04-02 RX ADMIN — IBUPROFEN 800 MG: 800 TABLET, FILM COATED ORAL at 22:55

## 2019-04-02 NOTE — ANESTHESIA PROCEDURE NOTES
Peripheral nerve/Neuraxial procedure note : epidural catheter  Pre-Procedure  Performed by  Patel Cisneros APRN CRNA   Location: OB      Pre-Anesthestic Checklist: patient identified, IV checked, risks and benefits discussed, informed consent, monitors and equipment checked, pre-op evaluation and at physician/surgeon's request    Timeout  Correct Patient: Yes   Correct Procedure: Yes   Correct Site: Yes   Correct Laterality: N/A   Correct Position: Yes   Site Marked: N/A   .   Procedure Documentation    Diagnosis:Acive labor.    Procedure:    Epidural catheter.  Insertion Site:L3-4  (midline approach) Injection technique: LORT air   Local skin infiltrated with 3 mL of 1% lidocaine.  PERLA at 7 cm     Patient Prep;mask, sterile gloves, povidone-iodine 7.5% surgical scrub, patient draped.  .  Needle: Touhy needle, Harvey Needle Gauge: 18.    Needle Length (Inches) 3.5  # of attempts: 1 and # of redirects:  .   Catheter: 20 G . .  Catheter threaded easily  4 cm epidural space.  .   .    Assessment/Narrative  Paresthesias: No.  .  .  Aspiration negative for heme or CSF  . Test dose of 3 mL lidocaine 1.5% w/ 1:200,000 epinephrine at. Test dose negative for signs of intravascular, subdural or intrathecal injection. Sensory Level Left: T6  Sensory Level Right: T6  Comments:  Patient tolerated epidural catheter placement well. There were no anesthesia related complications noted. Will follow as noted.

## 2019-04-02 NOTE — PROGRESS NOTES
IV pitocin was started at 1418 at 2 mu/min. Currently pt is having rare ctxs, FHTs reassuring, will follow IV induction Pitocin order set.

## 2019-04-02 NOTE — PLAN OF CARE
"Epidural test dose 1805, pt tolerated placement well, becoming more comfortable with ctx. Ctx q3-4\", T cat1. Will increase pitocin as able once pt more comfortable  "

## 2019-04-02 NOTE — ANESTHESIA PREPROCEDURE EVALUATION
"Anesthesia Pre-Procedure Evaluation    Patient: Leah Casarez   MRN: [id number] : 1985          Preoperative Diagnosis: * No surgery found *        Past Medical History:   Diagnosis Date     NO ACTIVE PROBLEMS (aka NONE)      Past Surgical History:   Procedure Laterality Date     NO HISTORY OF SURGERY         Anesthesia Evaluation       history and physical reviewed .      No history of anesthetic complications          ROS/MED HX    ENT/Pulmonary:  - neg pulmonary ROS     Neurologic:  - neg neurologic ROS     Cardiovascular:  - neg cardiovascular ROS       METS/Exercise Tolerance:     Hematologic:         Musculoskeletal:         GI/Hepatic:  - neg GI/hepatic ROS       Renal/Genitourinary:         Endo:         Psychiatric:         Infectious Disease:         Malignancy:         Other:                     neg OB ROS            Physical Exam  Normal systems: cardiovascular, pulmonary and dental    Airway   Mallampati: II  TM distance: > 3 FB  Neck ROM: full  Mouth opening: > 3 cm    Dental     Cardiovascular       Pulmonary             Lab Results   Component Value Date    WBC 9.5 2018    HGB 10.4 (L) 2019    HCT 35.9 2018     2018    TSH 1.25 2018    HCG Positive (A) 2018       Preop Vitals  BP Readings from Last 3 Encounters:   19 126/75   19 118/80   19 122/66    Pulse Readings from Last 3 Encounters:   19 94   19 84   19 96      Resp Readings from Last 3 Encounters:   19 15   19 18   19 18    SpO2 Readings from Last 3 Encounters:   19 99%   19 99%   19 99%      Temp Readings from Last 1 Encounters:   19 97.8  F (36.6  C) (Oral)    Ht Readings from Last 1 Encounters:   19 1.737 m (5' 8.4\")      Wt Readings from Last 1 Encounters:   19 117.5 kg (259 lb)    Estimated body mass index is 38.92 kg/m  as calculated from the following:    Height as of 19: 1.737 m (5' " "8.4\").    Weight as of 4/1/19: 117.5 kg (259 lb).       Anesthesia Plan      History & Physical Review  History and physical reviewed and following examination; no interval change.    ASA Status:  2 .  OB Epidural Asa: 2   NPO Status:  > 6 hours    Plan for Epidural          Postoperative Care      Consents  Anesthetic plan, risks, benefits and alternatives discussed with:  Patient, Patient and Spouse.  Use of blood products discussed: No .   .                 ESTELLA Umanzor CRNA  "

## 2019-04-02 NOTE — H&P
[facility] Labor and Delivery History and Physical    Leah Casarez MRN# [id number]   Age: 33 year old YOB: 1985     Date of Admission:  2019    Primary care provider: Dwayne Campos           Chief Complaint:   Leah Casarez is a 33 year old female who is 39w6d pregnant and being admitted for induction of labor, indication term pregnancy at 39w6d with favorable cervix (Bischop score of 8 yesterday). Had discussed induction yesterday in clinic, and she started marbella last night and in to this morning, contractions have become more painful and she started having difficulty breathing through them. They have been irregular ranging from 10 - 18 min.     Leah received good prenatal care here with Dr. Campos. Pregnancy has been uncomplicated. She is GBS (-), Hep B (-), blood type A+.           Pregnancy history:     OBSTETRIC HISTORY:    Obstetric History       T2      L1     SAB0   TAB0   Ectopic0   Multiple0   Live Births1       # Outcome Date GA Lbr Marlo/2nd Weight Sex Delivery Anes PTL Lv   3 Current            2 Term 17 40w4d 06:00 / 00:44 3.147 kg (6 lb 15 oz) M Vag-Spont EPI N JESSIE      Name: Kenton      Apgar1:  9                Apgar5: 9   1 Term 05/08/15 40w0d  3.317 kg (7 lb 5 oz) M   N       Name: Tabby      Obstetric Comments   JACKELIN:5/7/15 by 8 4/7 week ultrasound    to Demetrio.  This will be [facility] delivery at [facility].       EDC: Estimated Date of Delivery: 4/3/19    Prenatal Labs:   Lab Results   Component Value Date    ABO A 2018    RH Pos 2018    AS Neg 2018    HEPBANG Nonreactive 2018    CHPCRT  2017     Negative   Negative for C. trachomatis rRNA by transcription mediated amplification.   A negative result by transcription mediated amplification does not preclude the   presence of C. trachomatis infection because results are dependent on proper   and adequate collection, absence of  inhibitors, and sufficient rRNA to be   detected.      GCPCRT  06/21/2017     Negative   Negative for N. gonorrhoeae rRNA by transcription mediated amplification.   A negative result by transcription mediated amplification does not preclude the   presence of N. gonorrhoeae infection because results are dependent on proper   and adequate collection, absence of inhibitors, and sufficient rRNA to be   detected.      TREPAB Negative 12/27/2017    HGB 10.4 (L) 03/06/2019       GBS Status:   Lab Results   Component Value Date    GBS Negative 03/06/2019       Active Problem List  Patient Active Problem List   Diagnosis     Gluten intolerance     Obesity (BMI 35.0-39.9 without comorbidity)     Seasonal allergic rhinitis, unspecified chronicity, unspecified trigger     CARDIOVASCULAR SCREENING; LDL GOAL LESS THAN 160     Sprain of neck     Segmental dysfunction of cervical region     Segmental dysfunction of thoracic region     Tension headache     Segmental dysfunction of sacral region     Segmental dysfunction of lumbar region     Lumbago     Dislocation of symphysis pubis, initial encounter     Supervision of normal pregnancy     Pelvic relaxation disorder       Medication Prior to Admission  Medications Prior to Admission   Medication Sig Dispense Refill Last Dose     Prenatal Vit-Fe Fumarate-FA (PRENATAL VITAMIN PO) Take 1 tablet by mouth daily    4/1/2019 at 2100   .        Maternal Past Medical History:     Past Medical History:   Diagnosis Date     NO ACTIVE PROBLEMS (aka NONE)                        Family History:     Family History   Problem Relation Age of Onset     Arthritis Mother      No Known Problems Father      No Known Problems Maternal Grandmother      Diabetes Maternal Grandfather         Type II     Hyperlipidemia Paternal Grandmother      No Known Problems Paternal Grandfather      No Known Problems Brother      No Known Problems Son      No Known Problems Son      Family history reviewed             Social History:     Social History     Socioeconomic History     Marital status:      Spouse name: Not on file     Number of children: Not on file     Years of education: Not on file     Highest education level: Not on file   Occupational History     Occupation: Special Ed   Social Needs     Financial resource strain: Not on file     Food insecurity:     Worry: Not on file     Inability: Not on file     Transportation needs:     Medical: Not on file     Non-medical: Not on file   Tobacco Use     Smoking status: Never Smoker     Smokeless tobacco: Never Used   Substance and Sexual Activity     Alcohol use: No     Drug use: No     Sexual activity: Yes     Partners: Male     Birth control/protection: Pill   Lifestyle     Physical activity:     Days per week: Not on file     Minutes per session: Not on file     Stress: Not on file   Relationships     Social connections:     Talks on phone: Not on file     Gets together: Not on file     Attends Yarsani service: Not on file     Active member of club or organization: Not on file     Attends meetings of clubs or organizations: Not on file     Relationship status: Not on file     Intimate partner violence:     Fear of current or ex partner: Not on file     Emotionally abused: Not on file     Physically abused: Not on file     Forced sexual activity: Not on file   Other Topics Concern     Parent/sibling w/ CABG, MI or angioplasty before 65F 55M? No   Social History Narrative    8/2018  Lives in [place] with , Demetrio and sons, Tabby and Kenton.  No smokers in the home.  No indoor cats/kittens.  No concerns about domestic violence.  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              Review of Systems:   The Review of Systems is negative other than noted in the HPI          Physical Exam:     Vitals were reviewed  Patient Vitals for the past 8 hrs:   BP Temp Temp src Pulse Resp   04/02/19 1317 124/69 97.5  F (36.4  C) Oral 94 16     Constitutional:    awake, alert, cooperative, no apparent distress, and appears stated age     Lungs:   No increased work of breathing, good air exchange, clear to auscultation bilaterally, no crackles or wheezing     Cardiovascular:   Normal apical impulse, regular rate and rhythm, normal S1 and S2, no S3 or S4, and no murmur noted     Musculoskeletal:   Trace edema present      Cervical exam not performed  Presentation:Cephalic  Fetal Heart Rate Tracing: reactive and reassuring - baseline 145 with moderate variability, accelerations present, no decels  Tocometer: external monitor - intermittent                     Assessment:   Leah Casarez is a 39w6d pregnant female admitted with induction of labor, indication term pregnancy at 39w6d with favorable cervix. Had been having some irregular contractions at home that had increased with intensity.          Plan:   - Induction of labor with pitocin  - Plan to use epidural for pain control  - As labor progresses, plan to perform AROM to further augment labor  - Anticipate     Patient was seen and examined by myself and Dr. Campos. The note was then scribed by me.     Irene Oliveira, MS3  2019    I agree with the PFSH and ROS as completed by the MS.  The remainder of the encounter was performed by me and scribed by the MS.  The scribed note accurately reflects my personal services and the decisions made by me.     Electronically signed by:  Dwayne Campos M.D.  2019

## 2019-04-02 NOTE — PROGRESS NOTES
S:  Admission  B:  Leah is a  @ 39w6d gestation, GBS -, Hep. B -, pregnancy uncomplicated. EFW [id number] per MD on 19  A:  Patient admitted to room 333 for elective induction of labor with IV Pitocin  R: Will prepare & educate pt for induction of labor & oriented to room

## 2019-04-02 NOTE — PROGRESS NOTES
SUBJECTIVE:  Patient is starting to get more uncomfortable with contractions, anesthesia is coming soon to place an epidural.    OBJECTIVE:  Vital signs:  Temp: 97.8  F (36.6  C) Temp src: Oral BP: 119/56 Pulse: 78   Resp: 16 SpO2: 97 %            Baseline FHR at 140 with moderate variability and accelerations. No decelerations present.  Katja every 2-4 minutes.  Pitocin currently at 6m/u.    SVE 6/80%/0    ASSESSMENT:  Active labor at 39w6d after induction of labor with pitocin    PLAN:  - Anticipate   - Anesthesia will be her shortly to place an epidural  - Continue pitocin for labor augmentation    Patient was seen and examined by myself and Dr. Campos. The note was then scribed by me.     Irene Oliveira, MS3  2019    I agree with the PFSH and ROS as completed by the MS.  The remainder of the encounter was performed by me and scribed by the MS.  The scribed note accurately reflects my personal services and the decisions made by me.     Electronically signed by:  Dwayne Campos M.D.  2019

## 2019-04-02 NOTE — PLAN OF CARE
"Dr. Campos here,dilated 5-6/80/0. Requesting epidural and anesthesia called and coming. Fluid bolus started, consent signed. Pt rating ctx at \"6\" on pain scale. Ctx q2-4\", FHT cat 1. Pitocin at 6m/u  "

## 2019-04-03 LAB — HGB BLD-MCNC: 10.2 G/DL (ref 11.7–15.7)

## 2019-04-03 PROCEDURE — 36415 COLL VENOUS BLD VENIPUNCTURE: CPT | Performed by: FAMILY MEDICINE

## 2019-04-03 PROCEDURE — 12000000 ZZH R&B MED SURG/OB

## 2019-04-03 PROCEDURE — 25000132 ZZH RX MED GY IP 250 OP 250 PS 637: Performed by: FAMILY MEDICINE

## 2019-04-03 PROCEDURE — 85018 HEMOGLOBIN: CPT | Performed by: FAMILY MEDICINE

## 2019-04-03 RX ADMIN — IBUPROFEN 800 MG: 800 TABLET, FILM COATED ORAL at 05:47

## 2019-04-03 RX ADMIN — ACETAMINOPHEN 650 MG: 325 TABLET ORAL at 05:47

## 2019-04-03 RX ADMIN — ACETAMINOPHEN 650 MG: 325 TABLET ORAL at 15:38

## 2019-04-03 RX ADMIN — SENNOSIDES AND DOCUSATE SODIUM 1 TABLET: 8.6; 5 TABLET ORAL at 20:09

## 2019-04-03 RX ADMIN — ACETAMINOPHEN 650 MG: 325 TABLET ORAL at 09:33

## 2019-04-03 RX ADMIN — Medication: at 12:04

## 2019-04-03 RX ADMIN — IBUPROFEN 800 MG: 800 TABLET, FILM COATED ORAL at 12:04

## 2019-04-03 RX ADMIN — IBUPROFEN 800 MG: 800 TABLET, FILM COATED ORAL at 18:03

## 2019-04-03 RX ADMIN — SENNOSIDES AND DOCUSATE SODIUM 1 TABLET: 8.6; 5 TABLET ORAL at 09:32

## 2019-04-03 RX ADMIN — ACETAMINOPHEN 650 MG: 325 TABLET ORAL at 20:09

## 2019-04-03 NOTE — ANESTHESIA POSTPROCEDURE EVALUATION
Patient: Leah Casarez    * No procedures listed *    Diagnosis:* No pre-op diagnosis entered *  Diagnosis Additional Information: No value filed.    Anesthesia Type:  Epidural    Note:  Anesthesia Post Evaluation    Patient location during evaluation: bedside  Patient participation: Able to fully participate in evaluation  Level of consciousness: awake and alert  Pain management: satisfactory to patient  Airway patency: patent  Cardiovascular status: acceptable  Respiratory status: acceptable and room air  Hydration status: acceptable  PONV: none     Anesthetic complications: None    Comments: Patient was very happy with her OB Anesthetic care.  No anesthesia concerns are noted.  Patient stated all her motor and sensory functions have returned and I informed her she would likely have some pain at the epidural site which is comparable to a flu shot.  I also advised her to contact the anesthesia department if she had any further concerns or if she had persistent pain or redness at epidural site.  I will follow up with her if needed.        Last vitals:  Vitals:    04/02/19 2215 04/02/19 2230 04/02/19 2330   BP: 126/64 107/57 138/65   Pulse:      Resp:   18   Temp:   97.8  F (36.6  C)   SpO2:            Electronically Signed By: ESTELLA Serra CRNA  April 3, 2019  8:29 AM

## 2019-04-03 NOTE — PROGRESS NOTES
SUBJECTIVE:  Patient is comfortable with epidural pain control.    OBJECTIVE:  Temp: 97.8  F (36.6  C) Temp src: Oral BP: 119/56 Pulse: 78   Resp: 16 SpO2: 97 %         Baseline FHR at 140 with moderate variability and accelerations. No decelerations present.  Katja every 2-6 minutes.  Pitocin currently at 8m/u.    SVE 6/85%/0    AROM at 1855 with small amount of clear fluid noted.    ASSESSMENT:  Active labor at 39w6d after induction of labor with pitocin    PLAN:  - Anticipate   - Good pain control with epidural  - Continue pitocin for labor augmentation    Patient was seen and examined by myself and Dr. Campos. The note was then scribed by me.     Irene Oliveira, MS3  2019    I agree with the PFSH and ROS as completed by the MS.  The remainder of the encounter was performed by me and scribed by the MS.  The scribed note accurately reflects my personal services and the decisions made by me.     Electronically signed by:  Dwayne Campos M.D.  2019

## 2019-04-03 NOTE — PROGRESS NOTES
[facility] Obstetrics Post-Partum Progress Note          Assessment and Plan:    Assessment:   Post-partum day #1  Normal spontaneous vaginal delivery, small repaired 2nd degree posterior perineal tear  L&D complications: None      Doing well.  No excessive bleeding, may have more bleeding when she stands after sitting for a while  Feeling a lot of pelvic and back pain  Hgb stable 10.4 -> 10.2, no supplemental iron needed  Breastfeeding going well      Plan:   Ambulation encouraged  Continue scheduled ibuprofen, PRN tylenol and oxycodone as needed for pain  Anticipate discharge tomorrow             Interval History:   Doing well.  Having a lot of pelvic and back pain. Had pelvic relaxation pain throughout pregnancy. Taking scheduled ibuprofen. No fevers.  No history of foul-smelling vaginal discharge.  Good appetite.  Denies chest pain, shortness of breath, nausea or vomiting.  Vaginal bleeding is similar to a heavy menstrual flow.  Breastfeeding going well.           Significant Problems:    Active Problems:    Pregnancy     (spontaneous vaginal delivery)            Review of Systems:    The Review of Systems is negative other than noted in the HPI          Medications:       senna-docusate  1 tablet Oral BID    Or     senna-docusate  2 tablet Oral BID     acetaminophen, [START ON 2019] bisacodyl, hydrocortisone, ibuprofen, lactated ringers, lanolin, misoprostol, naloxone, - MEDICATION INSTRUCTIONS -, NO Rho (D) immune globulin (RhoGam) needed - mother Rh POSITIVE, - MEDICATION INSTRUCTIONS -, oxyCODONE, oxytocin in 0.9% NaCl, oxytocin, [START ON 2019] sodium phosphate, Tranexamic Acid          Physical Exam:   All vitals stable  /65   Pulse 78   Temp 97.8  F (36.6  C) (Oral)   Resp 18   LMP 2018 (Approximate)   SpO2 96%   Breastfeeding? yes  Uterine fundus is firm, non-tender and at the level of the umbilicus  Heart is regular rate and rhythm and lungs clear to  auscultation  Trace bilateral lower extremity swelling, extending up to mid-shin          Data:     Hemoglobin   Date Value Ref Range Status   04/03/2019 10.2 (L) 11.7 - 15.7 g/dL Final   03/06/2019 10.4 (L) 11.7 - 15.7 g/dL Final     No imaging studies have been ordered    Patient was seen and examined by myself and Dr. Campos. The note was then scribed by me.     Irene Oliveira, MS3  April 3, 2019

## 2019-04-03 NOTE — PLAN OF CARE
S: Shift review; [id number]  B:Leah is a  who delivered vaginally on 19 at ; second degree tear repaired. HgB 10.2 mg/dl.   A: VSS, patient is independent with mobility, pain only moderately controlled with p.o. pain meds; declines use of narcotics for her back pain/stiffness and significant cramping. Encourage tub for comfort. Warm blankets do provide some relief. Handles baby with confidence. Repeat brstfdr; reports is going better than has in the past. Both parents very attentive.   R: Continue with routine PP care and brstfd assist as needed.

## 2019-04-03 NOTE — PLAN OF CARE
S: Delivery  B: augmented Labor,  @ [id number] gestation, GBS negative   A: Patient delivered Vaginal at  with Dr. Campos in attendance. Baby delivered and placed on mother's low abdomen for delayed cord clamping where baby was dried and stimulated. After cord clamped and cut, baby was placed skin to skin on mother's chest within 5 minutes following delivery . Apgars 9/9. Placenta was delivered @  followed by administration of oxytocin. Bonding initiated with mom and baby. Educated mother on importance of exclusive breast feeding, expected feeding readiness cues and encouraged her to observe for feeding cues. Mother informed that breast feeding assistance would be provided. See flowsheet for VS and PP checks. Labor care plan goals met.  R: Expect routine postpartum care. Anticipate first feeding within the hour.

## 2019-04-03 NOTE — L&D DELIVERY NOTE
Leah is a 33-year-old -0-0-2 now P3-0-0-3 who is 39 6/7wks gestation who had prenatal care with me at the [facility].  The patient's pregnancy was uncomplicated. Her blood type is A+, Hep B -, Rubella immune, TreponemaAb -, HIV -.     The patient was brought in for Pitocin induction today, indication term dates and favorable cervix (Bischop score of 8).     FIRST STAGE: She came in she was marbella about every 10 - 18 minutes. She was 4cm/70% effaced/-3 station. Pitocin was started at 1418. AROM occurred at 1855. She had small amounts of clear amniotic fluid. Her Pitocin was at a max of 8 milliunits per minute. She received an epidural for pain management which worked very nicely for her. She was completely dilated and feeling pushy at .  Total time of first stage of labor was 3 hours and 15 minutes.     SECOND STAGE: She started pushing at  and delivered a viable female infant over an intact perineum in the JOSE presentation. No complications with second stage.    There was spontaneous crying and respirations noted. Baby was suctioned through the mouth and nares at completion of the delivery after it was placed on moms chest.  There was no nuchal cord. The cord was clamped x2 and cut by father of the baby.   Total time of second stage of labor was 11 minutes.     THIRD STAGE: Placenta delivered with active management in the Schultze presentation. There was a 3-vessel cord noted and the placenta was intact and complete on inspection.   Total time of third stage of labor was 5 minutes.     On inspection of the perineum, there was a small 2nd degree posterior perineal laceration noted. This was repaired with 2-0 vicryl in the standard fashion with good results.  Fundus was firm after I was done with the repair.  She received Pitocin 10 units through her IV bag after delivery of the placenta. Both mom and this female  who weighed 3110 grams or 6 pounds 9 ounces were stable when I  left the delivery room. Apgars were 9 and 9 at 1 and 5 minutes respectively.     Patient was seen and examined by myself and Dr. Campos. The note was then scribed by me.     Irene Oliveira, MS3  April 2, 2019    I agree with the PFSH and ROS as completed by the MS.  The remainder of the encounter was performed by me and scribed by the MS.  The scribed note accurately reflects my personal services and the decisions made by me.     Electronically signed by:  Dwayne Campos M.D.  4/2/2019

## 2019-04-03 NOTE — PLAN OF CARE
S: Transfer to postpartum  B: Vaginal birth @ , 2nd degree tear, with repair, breast feeding    A: Mother and baby transferred to postpartum unit at 2210 via walking after completion of immediate recovery period. No weakness with ambulation, was able to void and showered Patient oriented to room. Mother and baby bonding well and in satisfactory condition upon transfer.  R: Anticipate routine postpartum care.

## 2019-04-04 VITALS
HEART RATE: 73 BPM | OXYGEN SATURATION: 96 % | SYSTOLIC BLOOD PRESSURE: 112 MMHG | DIASTOLIC BLOOD PRESSURE: 61 MMHG | TEMPERATURE: 97.3 F | RESPIRATION RATE: 16 BRPM

## 2019-04-04 PROCEDURE — 25000132 ZZH RX MED GY IP 250 OP 250 PS 637: Performed by: FAMILY MEDICINE

## 2019-04-04 RX ORDER — ACETAMINOPHEN 325 MG/1
650 TABLET ORAL EVERY 4 HOURS PRN
Start: 2019-04-04 | End: 2019-05-01

## 2019-04-04 RX ORDER — AMOXICILLIN 250 MG
1 CAPSULE ORAL 2 TIMES DAILY
Start: 2019-04-04 | End: 2019-05-01

## 2019-04-04 RX ORDER — IBUPROFEN 800 MG/1
800 TABLET, FILM COATED ORAL EVERY 6 HOURS PRN
Qty: 90 TABLET | Refills: 1 | Status: SHIPPED | OUTPATIENT
Start: 2019-04-04 | End: 2019-05-01

## 2019-04-04 RX ADMIN — ACETAMINOPHEN 650 MG: 325 TABLET ORAL at 04:32

## 2019-04-04 RX ADMIN — Medication: at 08:37

## 2019-04-04 RX ADMIN — SENNOSIDES AND DOCUSATE SODIUM 2 TABLET: 8.6; 5 TABLET ORAL at 07:24

## 2019-04-04 RX ADMIN — IBUPROFEN 800 MG: 800 TABLET, FILM COATED ORAL at 00:37

## 2019-04-04 RX ADMIN — ACETAMINOPHEN 650 MG: 325 TABLET ORAL at 00:37

## 2019-04-04 RX ADMIN — IBUPROFEN 800 MG: 800 TABLET, FILM COATED ORAL at 07:24

## 2019-04-04 RX ADMIN — ACETAMINOPHEN 650 MG: 325 TABLET ORAL at 08:32

## 2019-04-04 NOTE — PROGRESS NOTES
S: Discharge  to home with  & baby.    B: Patient had a Vaginal delivery with no complications. Baby girl Baby's name Vera, breast: . Support person's name Demetrio.     A: Pt states understanding of post partum discharge instructions, meds, s/s of infection & f/u needed.    R: Post partum Discharge instructions reviewed and questions answered.  Belongings gathered and returned to the patient. Agreed to follow up in 6 weeks or sooner with any question or concerns.     Nursing Discharge Checklist:    Pneumovax screened and given, if appropriate: N/A  Influenza vaccine screened and given, if appropriate: N/A  Staples removed (): N/A  Breast milk returned: N/A  Hydrogel pads sent home:N/A  Birth Certificate Done: YES

## 2019-04-04 NOTE — PROGRESS NOTES
S: Shift review   B:Leah is a  who delivered vaginally on 4-2  A: VSS, patient is independent with mobility, pain well controlled with p.o. pain meds. Handles baby with confidence.  R: Continue with routine PP care

## 2019-04-04 NOTE — PROGRESS NOTES
Pt doing well, VSS, pain controlled well with po pain meds.  Plans for discharge to home this evening.

## 2019-04-04 NOTE — DISCHARGE SUMMARY
[facility] Discharge Summary    Leah Casarez MRN# [id number]   Age: 33 year old YOB: 1985     Date of Admission:  2019  Date of Discharge::  2019  Admitting Physician:  Dwayne Campos MD  Discharge Physician:  Dwayne Campos MD     Home clinic: [facility]          Admission Diagnoses:   Pregnancy   (spontaneous vaginal delivery)          Discharge Diagnosis:   Normal spontaneous vaginal delivery  Intrauterine pregnancy at 39 6/7weeks gestation          Procedures:   Procedure(s): No additional procedures performed            Medications Prior to Admission:     Medications Prior to Admission   Medication Sig Dispense Refill Last Dose     Prenatal Vit-Fe Fumarate-FA (PRENATAL VITAMIN PO) Take 1 tablet by mouth daily    2019 at 2100             Discharge Medications:     Current Discharge Medication List      START taking these medications    Details   acetaminophen (TYLENOL) 325 MG tablet Take 2 tablets (650 mg) by mouth every 4 hours as needed for mild pain or fever (greater than or equal to 38  C /100.4  F (oral) or 38.5  C/ 101.4  F (core).)    Associated Diagnoses:  (spontaneous vaginal delivery)      APNO CREA ointment Apply 1 g topically every hour as needed for skin care    Associated Diagnoses: Breast feeding status of mother      ibuprofen (ADVIL/MOTRIN) 800 MG tablet Take 1 tablet (800 mg) by mouth every 6 hours as needed for other (cramping)  Qty: 90 tablet, Refills: 1    Associated Diagnoses:  (spontaneous vaginal delivery)      senna-docusate (SENOKOT-S/PERICOLACE) 8.6-50 MG tablet Take 1 tablet by mouth 2 times daily    Associated Diagnoses:  (spontaneous vaginal delivery)         CONTINUE these medications which have NOT CHANGED    Details   Prenatal Vit-Fe Fumarate-FA (PRENATAL VITAMIN PO) Take 1 tablet by mouth daily                    Consultations:   No consultations were requested during this admission          Brief  History of Labor:   Leah Casarez is a 33 year old female who presented for induction of labor at 39w6d with favorable cervix on 19. Had had some contractions that morning before presenting. She was 4cm/70%/-3 station on presentation. Pitocin was started, and after she made further progress, she received an epidural for pain management and AROM occurred revealing a small amount of clear amniotic fluid. Tyler was delivered followed by placenta, without complication. Small posterior second degree perineal laceration which was repaired. See L&D Delivery note for further details regarding labor and delivery.           Hospital Course:   The patient's hospital course was unremarkable.  On discharge, her pain was well controlled with ibuprofen and tylenol. Vaginal bleeding is similar to peak menstrual flow.  Voiding without difficulty.  Ambulating well and tolerating a normal diet.  No fever.  Breastfeeding well.  Infant is stable.  No bowel movement yet, has been taking stool softners.  She was discharged on post-partum day #2.    Post-partum hemoglobin:   Hemoglobin   Date Value Ref Range Status   2019 10.2 (L) 11.7 - 15.7 g/dL Final             Discharge Instructions and Follow-Up:   Discharge diet: Regular   Discharge activity: Activity as tolerated  Pelvic rest: abstain from intercourse and do not use tampons for 6 week(s)   Discharge follow-up: Follow up with primary care provider in 6 weeks   Wound care: Instructed on postpartum perineal care.           Discharge Disposition:   Discharged to home      Attestation:  I have reviewed today's vital signs, notes, medications, labs and imaging.  Amount of time performed on this discharge summary: 15 minutes.    Patient was seen and examined by myself and Dr. Campos. The note was then scribed by me.     Irene Oliveira, MS3  2019    I agree with the PFSH and ROS as completed by the MS.  The remainder of the encounter was performed by me and scribed  by the MS.  The scribed note accurately reflects my personal services and the decisions made by me.     Electronically signed by:  Dwayne Campos M.D.  4/4/2019

## 2019-04-04 NOTE — DISCHARGE INSTRUCTIONS
[facility] Discharge Instructions     Discharge disposition:  Discharged to home       Diet:  Regular       Activity No lifting more than 20# x 2 wks then increase gradually by 10# per week.  No driving or operating machinery while on narcotic analgesics  Pelvic rest: abstain from intercourse and do not use tampons for 6 week(s)        Follow-up: Follow up with primary care provider in 6 weeks       Additional instructions: Stool softener: as needed for constipation        Postpartum Vaginal Delivery Instructions    Activity       Ask family and friends for help when you need it.    Do not place anything in your vagina for 6 weeks.    You are not restricted on other activities, but take it easy for a few weeks to allow your body to recover from delivery.  You are able to do any activities you feel up to that point.    No driving until you have stopped taking your pain medications (usually two weeks after delivery).     Call your health care provider if you have any of these symptoms:       Increased pain, swelling, redness, or fluid around your stiches from an episiotomy or perineal tear.    A fever above 100.4 F (38 C) with or without chills when placing a thermometer under your tongue.    You soak a sanitary pad with blood within 1 hour, or you see blood clots larger than a golf ball.    Bleeding that lasts more than 6 weeks.    Vaginal discharge that smells bad.    Severe pain, cramping or tenderness in your lower belly area.    A need to urinate more frequently (use the toilet more often), more urgently (use the toilet very quickly), or it burns when you urinate.    Nausea and vomiting.    Redness, swelling or pain around a vein in your leg.    Problems breastfeeding or a red or painful area on your breast.    Chest pain and cough or are gasping for air.    Problems coping with sadness, anxiety, or depression.  If you have any concerns about hurting yourself or the baby, call your provider  immediately.     You have questions or concerns after you return home.     Keep your hands clean:  Always wash your hands before touching your perineal area and stitches.  This helps reduce your risk of infection.  If your hands aren't dirty, you may use an alcohol hand-rub to clean your hands. Keep your nails clean and short.

## 2019-04-04 NOTE — PLAN OF CARE
S-(situation): shift note    B-(background): , , 1st pp day    A-(assessment): stable, independent with all self and  cares and feedings. Having some uterine cramping with breastfeeding, relief with ibuprofen and tylenol.     R-(recommendations): cont routine pp cares, discontinue tomorrow

## 2019-04-07 NOTE — PROGRESS NOTES
Leah Casarez  Gender: female  : 1985  [address]  [place] [id number] [phone] (home) [phone] (work)  Medical Record: [id number]  Primary Care Provider: Dwayne Campos       [facility]   ?   Discharge Phone Call: Key Words/Key Times     How are you and the baby?     How are feedings going?     Voiding & Stooling?     Any questions or concerns?     Follow-up appointment?       We want to provide excellent care here at [facility]. Do you have any feedback for us that would help us improve?     Call back COMMENTS:   Attempted to call pt for a PP followup phone call, but unsuccessful. Message left to call if having concerns or questions.       Attempted Calls:   ___X1______   19 1244 second attempt, no answer per IGNACIA Benavidez RN    __________

## 2019-04-08 ENCOUNTER — TELEPHONE (OUTPATIENT)
Dept: OBGYN | Facility: CLINIC | Age: 34
End: 2019-04-08

## 2019-04-08 NOTE — TELEPHONE ENCOUNTER
Leah Sangeeta Casarez  Gender: female  : 1985  [address]  [place] [id number] [phone] (home) [phone] (work)  Medical Record: [id number]  Primary Care Provider: Dwayne Campos       [facility]   ?   Discharge Phone Call: Key Words/Key Times     How are you and the baby?     How are feedings going?     Voiding & Stooling?     Any questions or concerns?     Follow-up appointment?       We want to provide excellent care here at [facility]. Do you have any feedback for us that would help us improve?     Call back COMMENTS:         Attempted Calls:   ___ Left Message ADOLFO Faustin RN______     __________

## 2019-04-11 ENCOUNTER — OFFICE VISIT (OUTPATIENT)
Dept: FAMILY MEDICINE | Facility: OTHER | Age: 34
End: 2019-04-11
Payer: COMMERCIAL

## 2019-04-11 VITALS
DIASTOLIC BLOOD PRESSURE: 62 MMHG | BODY MASS INDEX: 36.67 KG/M2 | TEMPERATURE: 98.2 F | WEIGHT: 244 LBS | SYSTOLIC BLOOD PRESSURE: 110 MMHG | RESPIRATION RATE: 16 BRPM | HEART RATE: 78 BPM

## 2019-04-11 DIAGNOSIS — J01.90 ACUTE NON-RECURRENT SINUSITIS, UNSPECIFIED LOCATION: Primary | ICD-10-CM

## 2019-04-11 PROCEDURE — 99213 OFFICE O/P EST LOW 20 MIN: CPT | Performed by: PHYSICIAN ASSISTANT

## 2019-04-11 RX ORDER — AMOXICILLIN 875 MG
875 TABLET ORAL 2 TIMES DAILY
Qty: 20 TABLET | Refills: 0 | Status: SHIPPED | OUTPATIENT
Start: 2019-04-11 | End: 2019-05-01

## 2019-04-11 ASSESSMENT — PAIN SCALES - GENERAL: PAINLEVEL: NO PAIN (0)

## 2019-04-11 NOTE — PROGRESS NOTES
SUBJECTIVE:   Leah Casarez is a 33 year old female who presents to clinic today for the following health issues:      HPI  Acute Illness   Acute illness concerns: sinuses   Onset: about 7 days , just had a baby, is breastfeeding 11 day ol infant.  entire family is sick , symptoms progressing     Fever: no    Chills/Sweats: no    Headache (location?): YES    Sinus Pressure:YES    Conjunctivitis:  no    Ear Pain: no    Rhinorrhea: no    Congestion: YES    Sore Throat: YES     Cough: YES - a little this morning     Wheeze: no    Decreased Appetite: no    Nausea: no    Vomiting: no    Diarrhea:  no    Dysuria/Freq.: no    Fatigue/Achiness: YES- fatigue     Sick/Strep Exposure: YES     Therapies Tried and outcome: ibuprofen     Additional history: as documented    Reviewed and updated as needed this visit by clinical staff  Tobacco  Allergies  Meds  Med Hx  Surg Hx  Fam Hx  Soc Hx        Reviewed and updated as needed this visit by Provider             BP Readings from Last 3 Encounters:   04/11/19 110/62   04/04/19 112/61   04/01/19 118/80    Wt Readings from Last 3 Encounters:   04/11/19 110.7 kg (244 lb)   04/01/19 117.5 kg (259 lb)   03/27/19 118.4 kg (261 lb)                  Labs reviewed in [facility]    ROS:  As above    OBJECTIVE:     /62   Pulse 78   Temp 98.2  F (36.8  C) (Temporal)   Resp 16   Wt 110.7 kg (244 lb)   LMP 06/27/2018 (Approximate)   BMI 36.67 kg/m    Body mass index is 36.67 kg/m .  GENERAL: healthy, alert and no distress  EYES: Eyes grossly normal to inspection  HENT: normal cephalic/atraumatic, ear canals and TM's normal, nose and mouth without ulcers or lesions, nasal mucosa edematous , oropharynx clear, oral mucous membranes moist and sinuses: maxillary tenderness on bilaterally  NECK: no adenopathy, no asymmetry, masses, or scars and thyroid normal to palpation  RESP: lungs clear to auscultation - no rales, rhonchi or wheezes  CV: regular rate and rhythm, normal S1 S2,  no S3 or S4, no murmur, click or rub, no peripheral edema and peripheral pulses strong  MS: no gross musculoskeletal defects noted, no edema  PSYCH: mentation appears normal, affect normal/bright    Diagnostic Test Results:  none     ASSESSMENT/PLAN:         1. Acute non-recurrent sinusitis, unspecified location  Nasal saline washes, otc meds for pain ,   - amoxicillin (AMOXIL) 875 MG tablet; Take 1 tablet (875 mg) by mouth 2 times daily  Dispense: 20 tablet; Refill: 0    Follow up as needed     Dacia Pratt PA-C  [facility]

## 2019-04-12 ENCOUNTER — MYC MEDICAL ADVICE (OUTPATIENT)
Dept: FAMILY MEDICINE | Facility: CLINIC | Age: 34
End: 2019-04-12

## 2019-04-16 ENCOUNTER — MYC MEDICAL ADVICE (OUTPATIENT)
Dept: FAMILY MEDICINE | Facility: OTHER | Age: 34
End: 2019-04-16

## 2019-04-16 DIAGNOSIS — J01.90 ACUTE SINUSITIS WITH SYMPTOMS > 10 DAYS: Primary | ICD-10-CM

## 2019-04-30 NOTE — PROGRESS NOTES
SUBJECTIVE:   Leah Casarez is a 34 year old female who presents to clinic today for the following health issues:    HPI     Concern - Red bumps  Onset: last Sunday felt irritated and this Saturday the bumps began appearing    Description:    various size red bumps on left side of body arm, back and chest    Intensity: moderate    Progression of Symptoms:  worsening    Accompanying Signs & Symptoms:  none    Previous history of similar problem:   none    Precipitating factors:   Worsened by: none    Alleviating factors:  Improved by: none  Therapies Tried and outcome: none    - They itch on chest (2) and back (1)   - In armpit, they hurt/uncomfortable, can't even put deodorant   - One month old at home           Additional history: as documented      ROS:  Constitutional, HEENT, cardiovascular, pulmonary, gi and gu systems are negative, except as otherwise noted.    OBJECTIVE:   /84   Pulse 84   Temp 97.6  F (36.4  C) (Temporal)   Resp 16   Wt 112 kg (247 lb)   LMP 06/27/2018 (Approximate)   SpO2 97%   BMI 37.12 kg/m    Body mass index is 37.12 kg/m .  GENERAL APPEARANCE: healthy, alert and no distress  EYES: Eyes grossly normal to inspection, PERRLA, conjunctivae and sclerae without injection or discharge, EOM intact   MS: No musculoskeletal defects are noted and gait is age appropriate without ataxia   SKIN:   Left upper chest - two 1 cm circular erythematous patches with central raising, slightly warm, not tender, no fluctuance   Left axillary area - several small erythematous patches as above, consistent with a bite   Left posterior upper arm - 3 patches as above   No other suspicious lesions or rashes, hydration status appears adeuqate with normal skin turgor   PSYCH: Alert and oriented x3; speech- coherent , normal rate and volume; able to articulate logical thoughts, able to abstract reason, no tangential thoughts, no hallucinations or delusions, mentation appears normal, Mood is  euthymic. Affect is appropriate for this mood state and bright. Thought content is free of suicidal ideation, hallucinations, and delusions. Dress is adequate and upkept. Eye contact is good during conversation.       Diagnostic Test Results:  none     ASSESSMENT/PLAN:       ICD-10-CM    1. Rash R21      - Discussed with patient rash that is most consistent with bite of some sort (spider?) vs. Tinea vs. Contact derm, but other two seem less likely   - Ones on chest appeared first and spread, making bite scenario more likely      But did discuss axillary area may be fungal   - Only 3 days of symptoms   - Recommend treatment with OTC antibacterial ointment   - Monitor   - Discussed warning signs that would warrant return to clinic/ED  - If doesn't improve, return to clinic and will get KOH     The patient indicates understanding of these issues and agrees with the plan.    Follow up: LOI Cuellar PA-C  [facility]

## 2019-05-01 ENCOUNTER — OFFICE VISIT (OUTPATIENT)
Dept: FAMILY MEDICINE | Facility: OTHER | Age: 34
End: 2019-05-01
Payer: COMMERCIAL

## 2019-05-01 VITALS
SYSTOLIC BLOOD PRESSURE: 118 MMHG | TEMPERATURE: 97.6 F | DIASTOLIC BLOOD PRESSURE: 84 MMHG | BODY MASS INDEX: 37.12 KG/M2 | HEART RATE: 84 BPM | WEIGHT: 247 LBS | OXYGEN SATURATION: 97 % | RESPIRATION RATE: 16 BRPM

## 2019-05-01 DIAGNOSIS — R21 RASH: Primary | ICD-10-CM

## 2019-05-01 PROCEDURE — 99213 OFFICE O/P EST LOW 20 MIN: CPT | Performed by: PHYSICIAN ASSISTANT

## 2019-05-01 ASSESSMENT — PAIN SCALES - GENERAL: PAINLEVEL: NO PAIN (0)

## 2019-05-01 NOTE — PATIENT INSTRUCTIONS
- Topical antibacterial - like neosporin        Twice a day        Wash each area with hand soap

## 2019-05-16 ENCOUNTER — PRENATAL OFFICE VISIT (OUTPATIENT)
Dept: FAMILY MEDICINE | Facility: CLINIC | Age: 34
End: 2019-05-16
Payer: COMMERCIAL

## 2019-05-16 VITALS
TEMPERATURE: 97.2 F | BODY MASS INDEX: 37.57 KG/M2 | OXYGEN SATURATION: 99 % | SYSTOLIC BLOOD PRESSURE: 104 MMHG | WEIGHT: 250 LBS | DIASTOLIC BLOOD PRESSURE: 66 MMHG | HEART RATE: 84 BPM | RESPIRATION RATE: 18 BRPM

## 2019-05-16 DIAGNOSIS — Z30.011 ENCOUNTER FOR INITIAL PRESCRIPTION OF CONTRACEPTIVE PILLS: Primary | ICD-10-CM

## 2019-05-16 PROCEDURE — 99207 ZZC POST PARTUM EXAM: CPT | Performed by: FAMILY MEDICINE

## 2019-05-16 RX ORDER — ACETAMINOPHEN AND CODEINE PHOSPHATE 120; 12 MG/5ML; MG/5ML
0.35 SOLUTION ORAL DAILY
Qty: 84 TABLET | Refills: 6 | Status: SHIPPED | OUTPATIENT
Start: 2019-05-16 | End: 2019-08-08

## 2019-05-16 ASSESSMENT — PATIENT HEALTH QUESTIONNAIRE - PHQ9: SUM OF ALL RESPONSES TO PHQ QUESTIONS 1-9: 0

## 2019-05-16 ASSESSMENT — PAIN SCALES - GENERAL: PAINLEVEL: NO PAIN (0)

## 2019-05-16 NOTE — PROGRESS NOTES
Leah is here for a 6-week postpartum checkup.    She had a  of a viable boy, weight 6 pounds 14 oz., with no complications. Date of delivery was 2019. Since delivery, she has been breast feeding.  She has no signs of infection, bleeding or other complications.  She is not pregnant.  We discussed contraceptions and she has chosen mini pill / progesterone only pill.      Post partum tubal: No  Type of Delivery:  Vaginal  Feeding Method:  Breast  If initiated breast feeding and stopped, how long did you breast feed?:        REVIEW OF SYSTEMS:  Ears/Nose/Throat: negative  Respiratory: negative  Cardiovascular: negative  Gastrointestinal: negative  Genitourinary: negative  Musculoskeletal: negative    Neurologic: negative   Skin: negative   Endocrine:  negative  Vagina: negative  Cervix: negative  Breasts: negative  Vulva: negative  Episiotomy: negative  Contraception Plan: mini pill / progesterone only pill  Medical History Reviewed yes - updated   Family History Reviewed yes - updated   Problem List Updated yes - updated     EXAM:    HEENT: grossly normal.  NECK: no lymphadenopathy or thyroidomegaly.  LUNGS: CTA X 2, no rales or crackles.  BACK: No spinal or CVA tenderness.  HEART: RRR without murmurs clicks or gallops.  ABDOMEN: soft, non tender, good bowel sounds, without masses   rebound, guarding or tenderness.  PELVIC:  Exam deferred, patient not due for a pap smear and she is without complaints or concerns today.     EXTREMITIES:  warm to touch, good pulses, no ankle edema or calf tenderness.  NEUROLOGIC: grossly normal.    ASSESSMENT:   6-week postpartum exam after .    PLAN:  Contraception methods discussed  Discussed calcium intake, vitamins and supplements  Exercise encouraged  Follow up in 1 year  Seat belt use encouraged  Weight loss recommended  Patient's Body mass index is 37.57 kg/m .  We discussed that an ideal BMI should be 20-25.  mini pill / progesterone only pill for  contraception.    Electronically signed by:  Dwayne Campos M.D.  5/16/2019

## 2019-06-17 PROBLEM — J30.2 SEASONAL ALLERGIC RHINITIS: Status: ACTIVE | Noted: 2017-08-08

## 2019-07-09 ENCOUNTER — MYC MEDICAL ADVICE (OUTPATIENT)
Dept: FAMILY MEDICINE | Facility: CLINIC | Age: 34
End: 2019-07-09

## 2019-07-10 NOTE — TELEPHONE ENCOUNTER
Message patient that she would make an appointment for a consultation with him. And figure date for the procedure that would work for her to be off work.   [facility]

## 2019-07-11 NOTE — TELEPHONE ENCOUNTER
I was approved to us a procedure time for the consultation on tubal for this patient.    I have called and scheduled patient for Wednesday 07/31/19   [facility]

## 2019-07-31 ENCOUNTER — OFFICE VISIT (OUTPATIENT)
Dept: FAMILY MEDICINE | Facility: CLINIC | Age: 34
End: 2019-07-31
Payer: COMMERCIAL

## 2019-07-31 ENCOUNTER — MYC MEDICAL ADVICE (OUTPATIENT)
Dept: FAMILY MEDICINE | Facility: CLINIC | Age: 34
End: 2019-07-31

## 2019-07-31 VITALS — OXYGEN SATURATION: 99 % | WEIGHT: 242 LBS | BODY MASS INDEX: 36.37 KG/M2 | TEMPERATURE: 96.6 F

## 2019-07-31 DIAGNOSIS — Z30.09 CONSULTATION FOR FEMALE STERILIZATION: Primary | ICD-10-CM

## 2019-07-31 PROCEDURE — 99214 OFFICE O/P EST MOD 30 MIN: CPT | Performed by: FAMILY MEDICINE

## 2019-07-31 ASSESSMENT — PAIN SCALES - GENERAL: PAINLEVEL: NO PAIN (0)

## 2019-07-31 NOTE — PROGRESS NOTES
SUBJECTIVE:    Patient is in for a consultation for a sterilization procedure.  This is a 34 year old old female in today for a consultation for sterilization procedure.  She has 3 children, ages 4 yrs, 2 1/2 yrs and almost 4 months.    She and her partner do not want to have any more children and she has decided to have the sterilization procedure.      Past Surgical History:   Procedure Laterality Date     NO HISTORY OF SURGERY         Past Medical History:   Diagnosis Date     NO ACTIVE PROBLEMS (aka NONE)        No Known Allergies      Current Outpatient Medications:      norethindrone (MICRONOR) 0.35 MG tablet, Take 1 tablet (0.35 mg) by mouth daily, Disp: 84 tablet, Rfl: 6     Prenatal Vit-Fe Fumarate-FA (PRENATAL VITAMIN PO), Take 1 tablet by mouth daily , Disp: , Rfl:     Social History     Socioeconomic History     Marital status:      Spouse name: Not on file     Number of children: Not on file     Years of education: Not on file     Highest education level: Not on file   Occupational History     Occupation: Special Ed   Social Needs     Financial resource strain: Not on file     Food insecurity:     Worry: Not on file     Inability: Not on file     Transportation needs:     Medical: Not on file     Non-medical: Not on file   Tobacco Use     Smoking status: Never Smoker     Smokeless tobacco: Never Used   Substance and Sexual Activity     Alcohol use: No     Drug use: No     Sexual activity: Yes     Partners: Male     Birth control/protection: Pill   Lifestyle     Physical activity:     Days per week: Not on file     Minutes per session: Not on file     Stress: Not on file   Relationships     Social connections:     Talks on phone: Not on file     Gets together: Not on file     Attends [employer] service: Not on file     Active member of club or organization: Not on file     Attends meetings of clubs or organizations: Not on file     Relationship status: Not on file     Intimate partner violence:      Fear of current or ex partner: Not on file     Emotionally abused: Not on file     Physically abused: Not on file     Forced sexual activity: Not on file   Other Topics Concern     Parent/sibling w/ CABG, MI or angioplasty before 65F 55M? No   Social History Narrative    ** Merged History Encounter **         8/2018  Lives in [place] with , Demetrio and sons, Tabby and Kenton.  No smokers in the home.  No indoor cats/kittens.  No concerns about domestic violence.  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         Family History   Problem Relation Age of Onset     Arthritis Mother      No Known Problems Father      No Known Problems Maternal Grandmother      Diabetes Maternal Grandfather         Type II     Hyperlipidemia Paternal Grandmother      No Known Problems Paternal Grandfather      No Known Problems Brother      No Known Problems Son      No Known Problems Son        I reviewed with the patient her desire for sterility and the other non permanent options available to her.  Patient is currently using progesterone only OCP.   She is certain that she wants to have a sterilization procedure.    I went through the risks and benefits of a tubal removal since they are now recommending that we do a complete salpingectomy to decrease the risk of ovarian cancer in these patients.  The patient is willing to go through the tubal removal versus just a tubal ligation.  The risks would include bleeding, wound infection, damage or injury to bowel, bladder or other internal organs and the risk of failure, although that risk of failure is almost nonexistent with the removal of the tube versus the 1 to 2% risk that there was with just a tubal ligation. She is also aware of the potential for side effects from anesthesia.  We talked about blood loss being less than 5-10 cc and the risk of infection being very minimal.  Postoperatively she is going to have some discomfort.  I did warn her about the potential for  diaphragmatic irritation with referred pain to her left chest and shoulder region.    I did show the patient where the incisions would be by showing her a diagram of the female anatomy and abdomen.     OBJECTIVE:     Temp 96.6  F (35.9  C) (Temporal)   Wt 109.8 kg (242 lb)   SpO2 99%   BMI 36.37 kg/m    No exam was done today.      Assessment:  Undersired fertility    Plan:  Will schedule the patient for a Laparoscopic Bilateral salpingectomy when she lets me know if there is a waiting period for her surgery.  She is aware that she will need a preop PE prior to the procedure.    If she has any further questions, she will contact me.  She did sign the consent forms today.  I answered all of her questions to her satisfaction.    Electronically signed by:  Dwayne Campos M.D.  7/31/2019

## 2019-08-06 ENCOUNTER — OFFICE VISIT (OUTPATIENT)
Dept: FAMILY MEDICINE | Facility: CLINIC | Age: 34
End: 2019-08-06
Payer: COMMERCIAL

## 2019-08-06 ENCOUNTER — TELEPHONE (OUTPATIENT)
Dept: FAMILY MEDICINE | Facility: CLINIC | Age: 34
End: 2019-08-06

## 2019-08-06 VITALS
WEIGHT: 242 LBS | SYSTOLIC BLOOD PRESSURE: 118 MMHG | BODY MASS INDEX: 36.37 KG/M2 | TEMPERATURE: 96.8 F | RESPIRATION RATE: 18 BRPM | DIASTOLIC BLOOD PRESSURE: 72 MMHG | HEART RATE: 100 BPM

## 2019-08-06 DIAGNOSIS — Z01.818 PREOP GENERAL PHYSICAL EXAM: Primary | ICD-10-CM

## 2019-08-06 PROCEDURE — 99214 OFFICE O/P EST MOD 30 MIN: CPT | Performed by: FAMILY MEDICINE

## 2019-08-06 ASSESSMENT — PAIN SCALES - GENERAL: PAINLEVEL: NO PAIN (0)

## 2019-08-06 NOTE — PROGRESS NOTES
[address] [address]  [phone]  Dept: [phone]    PRE-OP EVALUATION:  Today's date: 2019    Leah Casarez (: 1985) presents for pre-operative evaluation assessment as requested by Dr. Campos.  She requires evaluation and anesthesia risk assessment prior to undergoing surgery/procedure for treatment of SALPINGECTOMY, LAPAROSCOPIC BILATERAL .    Proposed Surgery/ Procedure: SALPINGECTOMY, LAPAROSCOPIC BILATERAL  Date of Surgery/ Procedure: 2019  Time of Surgery/ Procedure: 7:30  Hospital/Surgical Facility: [facility]  Fax number for surgical facility:   Primary Physician: Dwayne Campos  Type of Anesthesia Anticipated: General    Patient has a Health Care Directive or Living Will:  NO    1. NO - Do you have a history of heart attack, stroke, stent, bypass or surgery on an artery in the head, neck, heart or legs?  2. NO - Do you ever have any pain or discomfort in your chest?  3. NO - Do you have a history of  Heart Failure?  4. NO - Are you troubled by shortness of breath when: walking on the level, up a slight hill or at night?  5. NO - Do you currently have a cold, bronchitis or other respiratory infection?  6. NO - Do you have a cough, shortness of breath or wheezing?  7. NO - Do you sometimes get pains in the calves of your legs when you walk?  8. NO - Do you or anyone in your family have previous history of blood clots?  9. NO - Do you or does anyone in your family have a serious bleeding problem such as prolonged bleeding following surgeries or cuts?  10. NO - Have you ever had problems with anemia or been told to take iron pills?  11. NO - Have you had any abnormal blood loss such as black, tarry or bloody stools, or abnormal vaginal bleeding?  12. NO - Have you ever had a blood transfusion?  13. NO - Have you or any of your relatives ever had problems with anesthesia?  14. NO - Do you have sleep apnea, excessive  snoring or daytime drowsiness?  15. NO - Do you have any prosthetic heart valves?  16. NO - Do you have prosthetic joints?  17. NO - Is there any chance that you may be pregnant?      HPI:     HPI related to upcoming procedure:       See problem list for active medical problems.  Problems all longstanding and stable, except as noted/documented.  See ROS for pertinent symptoms related to these conditions.      MEDICAL HISTORY:     Patient Active Problem List    Diagnosis Date Noted     Pregnancy 2019     Priority: Medium      (spontaneous vaginal delivery) 2019     Priority: Medium     Supervision of normal pregnancy 2018     Priority: Medium     Pelvic relaxation disorder 2018     Priority: Medium     Dislocation of symphysis pubis, initial encounter 2017     Priority: Medium     Sprain of neck 10/23/2017     Priority: Medium     Segmental dysfunction of cervical region 10/23/2017     Priority: Medium     Segmental dysfunction of thoracic region 10/23/2017     Priority: Medium     Tension headache 10/23/2017     Priority: Medium     Segmental dysfunction of sacral region 10/23/2017     Priority: Medium     Segmental dysfunction of lumbar region 10/23/2017     Priority: Medium     Lumbago 10/23/2017     Priority: Medium     Seasonal allergic rhinitis, unspecified chronicity, unspecified trigger 2017     Priority: Medium     CARDIOVASCULAR SCREENING; LDL GOAL LESS THAN 160 2017     Priority: Medium     Gluten intolerance 2016     Priority: Medium     Obesity (BMI 35.0-39.9 without comorbidity) 2016     Priority: Medium      Past Medical History:   Diagnosis Date     NO ACTIVE PROBLEMS (aka NONE)      Past Surgical History:   Procedure Laterality Date     NO HISTORY OF SURGERY       Current Outpatient Medications   Medication Sig Dispense Refill     norethindrone (MICRONOR) 0.35 MG tablet Take 1 tablet (0.35 mg) by mouth daily 84 tablet 6     Prenatal Vit-Fe  Fumarate-FA (PRENATAL VITAMIN PO) Take 1 tablet by mouth daily        OTC products: None, except as noted above    No Known Allergies   Latex Allergy: NO    Social History     Tobacco Use     Smoking status: Never Smoker     Smokeless tobacco: Never Used   Substance Use Topics     Alcohol use: No     History   Drug Use No       REVIEW OF SYSTEMS:   Constitutional, neuro, ENT, endocrine, pulmonary, cardiac, gastrointestinal, genitourinary, musculoskeletal, integument and psychiatric systems are negative, except as otherwise noted.    EXAM:   /72   Pulse 100   Temp 96.8  F (36  C) (Temporal)   Resp 18   Wt 109.8 kg (242 lb)   BMI 36.37 kg/m      GENERAL APPEARANCE: healthy, alert and no distress     EYES: EOMI, PERRL     HENT: ear canals and TM's normal and nose and mouth without ulcers or lesions     NECK: no adenopathy, no asymmetry, masses, or scars and thyroid normal to palpation     RESP: lungs clear to auscultation - no rales, rhonchi or wheezes     CV: regular rates and rhythm, normal S1 S2, no S3 or S4 and no murmur, click or rub     Abdomen: I had shown her on a diagram where the incisions would be made I did not reexamine her abdomen today.     SKIN: no suspicious lesions or rashes     NEURO: Grossly intact.     PSYCH: mentation appears normal. and affect normal/bright     LYMPHATICS: No cervical adenopathy    DIAGNOSTICS:   EKG: Not indicated due to non-vascular surgery and low risk of event (age <65 and without cardiac risk factors)    Recent Labs   Lab Test 04/03/19  0649 03/06/19  1548  08/24/18  1206  06/07/17  1307   HGB 10.2* 10.4*   < > 11.8   < > 11.9   PLT  --   --   --  282  --  294    < > = values in this interval not displayed.        IMPRESSION:   Reason for surgery/procedure: Undesired fertility scheduled for a bilateral laparoscopic salpingectomy on 8/12/2019 at 7:30 AM.  Diagnosis/reason for consult: Preoperative evaluation for surgery and anesthesia    The proposed surgical  procedure is considered INTERMEDIATE risk.    REVISED CARDIAC RISK INDEX  The patient has the following serious cardiovascular risks for perioperative complications such as (MI, PE, VFib and 3  AV Block):  No serious cardiac risks  INTERPRETATION: 0 risks: Class I (very low risk - 0.4% complication rate)    The patient has the following additional risks for perioperative complications:  No identified additional risks    No diagnosis found.    RECOMMENDATIONS:   APPROVAL GIVEN to proceed with proposed procedure, without further diagnostic evaluation     Electronically signed by:  Dwayne Campos M.D.  8/6/2019      Copy of this evaluation report is provided to requesting physician.    [facility] Preop Guidelines    Revised Cardiac Risk Index

## 2019-08-06 NOTE — NURSING NOTE
Chief Complaint   Patient presents with     Pre-Op Exam     SALPINGECTOMY, LAPAROSCOPIC BILATERAL 8/12/2019     MP/MA

## 2019-08-06 NOTE — TELEPHONE ENCOUNTER
Reason for Call:  Same Day Appointment, Requested Provider:  Dwayne Campos M.D.    PCP: Dwayne Campos    Reason for visit: Leah will be here with Vera today for her 4mos WCC - Leah is wondering if she can do her Pre-op at that time also.  Leah states she can do the 7:30am next Wed if you want to go ahead and schedule her.    Duration of symptoms:     Have you been treated for this in the past? Yes    Additional comments: She will talk with you when she is in at 10:30am for Vera's appt today    Can we leave a detailed message on this number? YES    Phone number patient can be reached at: Home number on file [phone] (home)    Best Time: any    Call taken on 8/6/2019 at 8:50 AM by Kellee Rangel

## 2019-08-06 NOTE — TELEPHONE ENCOUNTER
Called patient and left message to call the clinic back.  MJP/MA      We will talk to her today.  MP/MA

## 2019-08-06 NOTE — TELEPHONE ENCOUNTER
She is scheduled for her Salpingectomy on 8/14/19 at 07:30 am.  We need to get her in for a quick Pre-op exam too.      Electronically signed by:  Dwayne Campos M.D.  8/6/2019

## 2019-08-09 RX ORDER — CEFAZOLIN SODIUM 2 G/100ML
2 INJECTION, SOLUTION INTRAVENOUS
Status: CANCELLED | OUTPATIENT
Start: 2019-08-09

## 2019-08-12 ENCOUNTER — HOSPITAL ENCOUNTER (OUTPATIENT)
Facility: CLINIC | Age: 34
Discharge: HOME | End: 2019-08-12
Attending: FAMILY MEDICINE | Admitting: FAMILY MEDICINE
Payer: COMMERCIAL

## 2019-08-12 ENCOUNTER — ANESTHESIA (OUTPATIENT)
Dept: SURGERY | Facility: CLINIC | Age: 34
End: 2019-08-12
Payer: COMMERCIAL

## 2019-08-12 ENCOUNTER — ANESTHESIA EVENT (OUTPATIENT)
Dept: SURGERY | Facility: CLINIC | Age: 34
End: 2019-08-12
Payer: COMMERCIAL

## 2019-08-12 VITALS
HEART RATE: 52 BPM | SYSTOLIC BLOOD PRESSURE: 112 MMHG | DIASTOLIC BLOOD PRESSURE: 58 MMHG | TEMPERATURE: 97.8 F | RESPIRATION RATE: 16 BRPM | OXYGEN SATURATION: 94 %

## 2019-08-12 DIAGNOSIS — Z98.890 S/P LAPAROSCOPY: Primary | ICD-10-CM

## 2019-08-12 LAB — B-HCG SERPL-ACNC: <1 IU/L (ref 0–5)

## 2019-08-12 PROCEDURE — 25000128 H RX IP 250 OP 636: Performed by: FAMILY MEDICINE

## 2019-08-12 PROCEDURE — 37000008 ZZH ANESTHESIA TECHNICAL FEE, 1ST 30 MIN: Performed by: FAMILY MEDICINE

## 2019-08-12 PROCEDURE — 25000125 ZZHC RX 250: Performed by: FAMILY MEDICINE

## 2019-08-12 PROCEDURE — 58661 LAPAROSCOPY REMOVE ADNEXA: CPT | Performed by: FAMILY MEDICINE

## 2019-08-12 PROCEDURE — 36000056 ZZH SURGERY LEVEL 3 1ST 30 MIN: Performed by: FAMILY MEDICINE

## 2019-08-12 PROCEDURE — 88302 TISSUE EXAM BY PATHOLOGIST: CPT | Performed by: FAMILY MEDICINE

## 2019-08-12 PROCEDURE — 71000014 ZZH RECOVERY PHASE 1 LEVEL 2 FIRST HR: Performed by: FAMILY MEDICINE

## 2019-08-12 PROCEDURE — 37000009 ZZH ANESTHESIA TECHNICAL FEE, EACH ADDTL 15 MIN: Performed by: FAMILY MEDICINE

## 2019-08-12 PROCEDURE — 25800030 ZZH RX IP 258 OP 636: Performed by: NURSE ANESTHETIST, CERTIFIED REGISTERED

## 2019-08-12 PROCEDURE — 84702 CHORIONIC GONADOTROPIN TEST: CPT | Performed by: FAMILY MEDICINE

## 2019-08-12 PROCEDURE — 71000027 ZZH RECOVERY PHASE 2 EACH 15 MINS: Performed by: FAMILY MEDICINE

## 2019-08-12 PROCEDURE — 25000564 ZZH DESFLURANE, EA 15 MIN: Performed by: FAMILY MEDICINE

## 2019-08-12 PROCEDURE — 25000128 H RX IP 250 OP 636: Performed by: NURSE ANESTHETIST, CERTIFIED REGISTERED

## 2019-08-12 PROCEDURE — 40000306 ZZH STATISTIC PRE PROC ASSESS II: Performed by: FAMILY MEDICINE

## 2019-08-12 PROCEDURE — 25000125 ZZHC RX 250: Performed by: NURSE ANESTHETIST, CERTIFIED REGISTERED

## 2019-08-12 PROCEDURE — 36000058 ZZH SURGERY LEVEL 3 EA 15 ADDTL MIN: Performed by: FAMILY MEDICINE

## 2019-08-12 PROCEDURE — 27210794 ZZH OR GENERAL SUPPLY STERILE: Performed by: FAMILY MEDICINE

## 2019-08-12 PROCEDURE — 25000132 ZZH RX MED GY IP 250 OP 250 PS 637: Performed by: FAMILY MEDICINE

## 2019-08-12 PROCEDURE — 88302 TISSUE EXAM BY PATHOLOGIST: CPT | Mod: 26 | Performed by: FAMILY MEDICINE

## 2019-08-12 RX ORDER — HYDRALAZINE HYDROCHLORIDE 20 MG/ML
2.5-5 INJECTION INTRAMUSCULAR; INTRAVENOUS EVERY 10 MIN PRN
Status: DISCONTINUED | OUTPATIENT
Start: 2019-08-12 | End: 2019-08-12 | Stop reason: HOSPADM

## 2019-08-12 RX ORDER — SODIUM CHLORIDE, SODIUM LACTATE, POTASSIUM CHLORIDE, CALCIUM CHLORIDE 600; 310; 30; 20 MG/100ML; MG/100ML; MG/100ML; MG/100ML
INJECTION, SOLUTION INTRAVENOUS CONTINUOUS
Status: DISCONTINUED | OUTPATIENT
Start: 2019-08-12 | End: 2019-08-12 | Stop reason: HOSPADM

## 2019-08-12 RX ORDER — ALBUTEROL SULFATE 0.83 MG/ML
2.5 SOLUTION RESPIRATORY (INHALATION) EVERY 4 HOURS PRN
Status: DISCONTINUED | OUTPATIENT
Start: 2019-08-12 | End: 2019-08-12 | Stop reason: HOSPADM

## 2019-08-12 RX ORDER — SODIUM CHLORIDE, SODIUM LACTATE, POTASSIUM CHLORIDE, CALCIUM CHLORIDE 600; 310; 30; 20 MG/100ML; MG/100ML; MG/100ML; MG/100ML
INJECTION, SOLUTION INTRAVENOUS CONTINUOUS PRN
Status: DISCONTINUED | OUTPATIENT
Start: 2019-08-12 | End: 2019-08-12

## 2019-08-12 RX ORDER — MEPERIDINE HYDROCHLORIDE 25 MG/ML
12.5 INJECTION INTRAMUSCULAR; INTRAVENOUS; SUBCUTANEOUS
Status: DISCONTINUED | OUTPATIENT
Start: 2019-08-12 | End: 2019-08-12 | Stop reason: HOSPADM

## 2019-08-12 RX ORDER — ONDANSETRON 4 MG/1
4 TABLET, ORALLY DISINTEGRATING ORAL EVERY 30 MIN PRN
Status: DISCONTINUED | OUTPATIENT
Start: 2019-08-12 | End: 2019-08-12 | Stop reason: HOSPADM

## 2019-08-12 RX ORDER — ONDANSETRON 2 MG/ML
4 INJECTION INTRAMUSCULAR; INTRAVENOUS EVERY 30 MIN PRN
Status: DISCONTINUED | OUTPATIENT
Start: 2019-08-12 | End: 2019-08-12 | Stop reason: HOSPADM

## 2019-08-12 RX ORDER — OXYCODONE AND ACETAMINOPHEN 5; 325 MG/1; MG/1
1 TABLET ORAL EVERY 6 HOURS PRN
Qty: 12 TABLET | Refills: 0 | Status: SHIPPED | OUTPATIENT
Start: 2019-08-12 | End: 2019-09-17

## 2019-08-12 RX ORDER — FENTANYL CITRATE 50 UG/ML
INJECTION, SOLUTION INTRAMUSCULAR; INTRAVENOUS PRN
Status: DISCONTINUED | OUTPATIENT
Start: 2019-08-12 | End: 2019-08-12

## 2019-08-12 RX ORDER — CEFAZOLIN SODIUM 2 G/100ML
2 INJECTION, SOLUTION INTRAVENOUS
Status: COMPLETED | OUTPATIENT
Start: 2019-08-12 | End: 2019-08-12

## 2019-08-12 RX ORDER — ONDANSETRON 2 MG/ML
INJECTION INTRAMUSCULAR; INTRAVENOUS PRN
Status: DISCONTINUED | OUTPATIENT
Start: 2019-08-12 | End: 2019-08-12

## 2019-08-12 RX ORDER — KETOROLAC TROMETHAMINE 30 MG/ML
INJECTION, SOLUTION INTRAMUSCULAR; INTRAVENOUS PRN
Status: DISCONTINUED | OUTPATIENT
Start: 2019-08-12 | End: 2019-08-12

## 2019-08-12 RX ORDER — HYDROMORPHONE HYDROCHLORIDE 1 MG/ML
.3-.5 INJECTION, SOLUTION INTRAMUSCULAR; INTRAVENOUS; SUBCUTANEOUS EVERY 10 MIN PRN
Status: DISCONTINUED | OUTPATIENT
Start: 2019-08-12 | End: 2019-08-12 | Stop reason: HOSPADM

## 2019-08-12 RX ORDER — BUPIVACAINE HYDROCHLORIDE AND EPINEPHRINE 2.5; 5 MG/ML; UG/ML
INJECTION, SOLUTION INFILTRATION; PERINEURAL PRN
Status: DISCONTINUED | OUTPATIENT
Start: 2019-08-12 | End: 2019-08-12 | Stop reason: HOSPADM

## 2019-08-12 RX ORDER — IBUPROFEN 800 MG/1
800 TABLET, FILM COATED ORAL EVERY 8 HOURS PRN
Qty: 90 TABLET | Refills: 1 | Status: SHIPPED | OUTPATIENT
Start: 2019-08-12 | End: 2019-09-17

## 2019-08-12 RX ORDER — LIDOCAINE HYDROCHLORIDE 20 MG/ML
INJECTION, SOLUTION INFILTRATION; PERINEURAL PRN
Status: DISCONTINUED | OUTPATIENT
Start: 2019-08-12 | End: 2019-08-12

## 2019-08-12 RX ORDER — DIMENHYDRINATE 50 MG/ML
INJECTION, SOLUTION INTRAMUSCULAR; INTRAVENOUS PRN
Status: DISCONTINUED | OUTPATIENT
Start: 2019-08-12 | End: 2019-08-12

## 2019-08-12 RX ORDER — FENTANYL CITRATE 50 UG/ML
25-50 INJECTION, SOLUTION INTRAMUSCULAR; INTRAVENOUS
Status: DISCONTINUED | OUTPATIENT
Start: 2019-08-12 | End: 2019-08-12 | Stop reason: HOSPADM

## 2019-08-12 RX ORDER — DEXAMETHASONE SODIUM PHOSPHATE 10 MG/ML
INJECTION, SOLUTION INTRAMUSCULAR; INTRAVENOUS PRN
Status: DISCONTINUED | OUTPATIENT
Start: 2019-08-12 | End: 2019-08-12

## 2019-08-12 RX ORDER — PROPOFOL 10 MG/ML
INJECTION, EMULSION INTRAVENOUS CONTINUOUS PRN
Status: DISCONTINUED | OUTPATIENT
Start: 2019-08-12 | End: 2019-08-12

## 2019-08-12 RX ORDER — OXYCODONE AND ACETAMINOPHEN 5; 325 MG/1; MG/1
1 TABLET ORAL EVERY 6 HOURS PRN
Status: DISCONTINUED | OUTPATIENT
Start: 2019-08-12 | End: 2019-08-12 | Stop reason: HOSPADM

## 2019-08-12 RX ORDER — NALOXONE HYDROCHLORIDE 0.4 MG/ML
.1-.4 INJECTION, SOLUTION INTRAMUSCULAR; INTRAVENOUS; SUBCUTANEOUS
Status: DISCONTINUED | OUTPATIENT
Start: 2019-08-12 | End: 2019-08-12 | Stop reason: HOSPADM

## 2019-08-12 RX ORDER — PROPOFOL 10 MG/ML
INJECTION, EMULSION INTRAVENOUS PRN
Status: DISCONTINUED | OUTPATIENT
Start: 2019-08-12 | End: 2019-08-12

## 2019-08-12 RX ADMIN — DIMENHYDRINATE 25 MG: 50 INJECTION, SOLUTION INTRAMUSCULAR; INTRAVENOUS at 09:20

## 2019-08-12 RX ADMIN — KETOROLAC TROMETHAMINE 30 MG: 30 INJECTION, SOLUTION INTRAMUSCULAR at 09:41

## 2019-08-12 RX ADMIN — FENTANYL CITRATE 100 MCG: 50 INJECTION, SOLUTION INTRAMUSCULAR; INTRAVENOUS at 09:06

## 2019-08-12 RX ADMIN — PROPOFOL 200 MG: 10 INJECTION, EMULSION INTRAVENOUS at 09:15

## 2019-08-12 RX ADMIN — SODIUM CHLORIDE, SODIUM LACTATE, POTASSIUM CHLORIDE, CALCIUM CHLORIDE: 600; 310; 30; 20 INJECTION, SOLUTION INTRAVENOUS at 09:28

## 2019-08-12 RX ADMIN — DEXAMETHASONE SODIUM PHOSPHATE 5 MG: 10 INJECTION, SOLUTION INTRAMUSCULAR; INTRAVENOUS at 09:20

## 2019-08-12 RX ADMIN — MIDAZOLAM 2 MG: 1 INJECTION INTRAMUSCULAR; INTRAVENOUS at 09:06

## 2019-08-12 RX ADMIN — OXYCODONE HYDROCHLORIDE AND ACETAMINOPHEN 1 TABLET: 5; 325 TABLET ORAL at 10:54

## 2019-08-12 RX ADMIN — SUGAMMADEX 200 MG: 100 INJECTION, SOLUTION INTRAVENOUS at 09:41

## 2019-08-12 RX ADMIN — ROCURONIUM BROMIDE 40 MG: 10 INJECTION INTRAVENOUS at 09:15

## 2019-08-12 RX ADMIN — PROPOFOL 200 MCG/KG/MIN: 10 INJECTION, EMULSION INTRAVENOUS at 09:15

## 2019-08-12 RX ADMIN — ONDANSETRON 4 MG: 2 INJECTION INTRAMUSCULAR; INTRAVENOUS at 09:20

## 2019-08-12 RX ADMIN — CEFAZOLIN SODIUM 2 G: 2 INJECTION, SOLUTION INTRAVENOUS at 09:21

## 2019-08-12 RX ADMIN — SODIUM CHLORIDE, SODIUM LACTATE, POTASSIUM CHLORIDE, CALCIUM CHLORIDE: 600; 310; 30; 20 INJECTION, SOLUTION INTRAVENOUS at 09:03

## 2019-08-12 RX ADMIN — LIDOCAINE HYDROCHLORIDE 60 MG: 20 INJECTION, SOLUTION INFILTRATION; PERINEURAL at 09:15

## 2019-08-12 NOTE — PROCEDURES
[facility] Gynecology Brief Operative Note    Pre-operative diagnosis: Undesired fertility   Post-operative diagnosis: Same   Procedure: Bilateral laparoscopic salpingectomy   Surgeon: Dwayne Campos MD, MD   Assistant(s): Yoan Paredes MD   Anesthesia: General Endotracheal Anesthesia with Dante Ochoa CRNA   Estimated blood loss: <5 ml   Total IV fluids: (See anesthesia record)   Blood transfusion: No transfusion was given during surgery   Total urine output: (See anesthesia record)   Drains: None   Specimens: Bilateral fallopian tubes placed in formalin and sent to pathology.   Findings:  Normal-appearing uterus ovaries and fallopian tubes   Complications: None   Condition: Stable   Comments: See dictated operative report for full details       Electronically signed by:  Dwayne Campos M.D.  8/12/2019

## 2019-08-12 NOTE — ANESTHESIA POSTPROCEDURE EVALUATION
Patient: Leah Casarez    Procedure(s):  SALPINGECTOMY, LAPAROSCOPIC BILATERAL    Diagnosis:Undesired fertility  Diagnosis Additional Information: No value filed.    Anesthesia Type:  General, ETT    Note:  Anesthesia Post Evaluation    Patient location during evaluation: Phase 2  Patient participation: Able to fully participate in evaluation  Level of consciousness: awake  Pain management: adequate  Airway patency: patent  Cardiovascular status: acceptable and hemodynamically stable  Respiratory status: acceptable, room air and nonlabored ventilation  Hydration status: stable  PONV: none     Anesthetic complications: None    Comments: Patient was happy with the anesthesia care received and no anesthesia related complications were noted.  I will follow up with the patient again if it is needed.        Last vitals:  Vitals:    08/12/19 1018 08/12/19 1025 08/12/19 1030   BP: 126/67 118/61 112/58   Pulse:   52   Resp: 19 16 16   Temp: 97.8  F (36.6  C)     SpO2: 95%  94%         Electronically Signed By: ESTELLA Choi CRNA  August 12, 2019  11:01 AM

## 2019-08-12 NOTE — OR NURSING
Verbal report received from Marie ADAMS RN and Dante DING. Phase 1 recovery assumed by Marie MCCOY. Pt post-op general anesthesia for lap salpingectomy per .

## 2019-08-12 NOTE — ANESTHESIA CARE TRANSFER NOTE
Patient: Leah Casarez    Procedure(s):  SALPINGECTOMY, LAPAROSCOPIC BILATERAL    Diagnosis: Undesired fertility  Diagnosis Additional Information: No value filed.    Anesthesia Type:   General, ETT     Note:  Airway :Face Mask  Patient transferred to:PACU  Handoff Report: Identifed the Patient, Identified the Reponsible Provider, Reviewed the pertinent medical history, Discussed the surgical course, Reviewed Intra-OP anesthesia mangement and issues during anesthesia, Set expectations for post-procedure period and Allowed opportunity for questions and acknowledgement of understanding      Vitals: (Last set prior to Anesthesia Care Transfer)    CRNA VITALS  8/12/2019 0923 - 8/12/2019 0957      8/12/2019             Pulse:  63    SpO2:  100 %    Resp Rate (observed):  6  (Abnormal)                 Electronically Signed By: ESTELLA Choi CRNA  August 12, 2019  9:57 AM

## 2019-08-12 NOTE — ANESTHESIA PREPROCEDURE EVALUATION
"Anesthesia Pre-Procedure Evaluation    Patient: Leah Casarez   MRN: [id number] : 1985          Preoperative Diagnosis: * No surgery found *        Past Medical History:   Diagnosis Date     NO ACTIVE PROBLEMS (aka NONE)      Past Surgical History:   Procedure Laterality Date     NO HISTORY OF SURGERY         Anesthesia Evaluation       history and physical reviewed . Pt has had prior anesthetic. Type: MAC    No history of anesthetic complications          ROS/MED HX    ENT/Pulmonary:  - neg pulmonary ROS     Neurologic:  - neg neurologic ROS     Cardiovascular:  - neg cardiovascular ROS       METS/Exercise Tolerance:     Hematologic:         Musculoskeletal:         GI/Hepatic:  - neg GI/hepatic ROS       Renal/Genitourinary:         Endo:         Psychiatric:         Infectious Disease:         Malignancy:         Other:                     neg OB ROS            Physical Exam  Normal systems: cardiovascular, pulmonary and dental    Airway   Mallampati: II  TM distance: > 3 FB  Neck ROM: full  Mouth opening: > 3 cm    Dental     Cardiovascular       Pulmonary             Lab Results   Component Value Date    WBC 9.5 2018    HGB 10.2 (L) 2019    HCT 35.9 2018     2018    TSH 1.25 2018    HCG Positive (A) 2018       Preop Vitals  BP Readings from Last 3 Encounters:   19 118/72   19 (P) 104/68   19 104/66    Pulse Readings from Last 3 Encounters:   19 100   19 (P) 80   19 84      Resp Readings from Last 3 Encounters:   19 18   19 (P) 18   19 18    SpO2 Readings from Last 3 Encounters:   19 99%   19 99%   19 97%      Temp Readings from Last 1 Encounters:   19 96.8  F (36  C) (Temporal)    Ht Readings from Last 1 Encounters:   19 1.737 m (5' 8.4\")      Wt Readings from Last 1 Encounters:   19 109.8 kg (242 lb)    Estimated body mass index is 36.37 kg/m  as calculated from " "the following:    Height as of 1/9/19: 1.737 m (5' 8.4\").    Weight as of 8/6/19: 109.8 kg (242 lb).       Anesthesia Plan      History & Physical Review  History and physical reviewed and following examination; no interval change.    ASA Status:  2 .  OB Epidural Asa: 2   NPO Status:  > 6 hours    Plan for General and ETT with Intravenous and Propofol induction. Maintenance will be Balanced.    PONV prophylaxis:  Ondansetron (or other 5HT-3) and Dexamethasone or Solumedrol       Postoperative Care  Postoperative pain management:  IV analgesics and Oral pain medications.      Consents  Anesthetic plan, risks, benefits and alternatives discussed with:  Patient, Patient and Spouse.  Use of blood products discussed: No .   .                   ESTELLA Choi CRNA  "

## 2019-08-12 NOTE — PROCEDURES
Preoperative diagnosis: Undesired fertility  Postoperative diagnosis: Same  Procedure: Bilateral laparoscopic salpingectomy  Surgeon: Beth GONZALEZ   Assistant: Reggie GONZALEZ anesthesia:  General endotracheal with Dante Ochoa CRNA  Indications: This is a 34-year-old G3, P3 who has decided with her  not to have any more children.  They decided to have her do a laparoscopic salpingectomy as a form of sterilization.  Please see my consult note in epic for details.  Patient had no further questions for me this morning he did sign consent forms.    Procedure: Patient was taken to the operating room where she was placed under general endotracheal anesthesia.  She was then put in the lithotomy position where she was prepped and draped in the routine sterile fashion.  Her bladder was emptied by straight cath.  Attention was then placed to the umbilical region where a small stab incision was made with a #11 blade.  A varies needle was placed and verified with sterile saline.  Insufflation was obtained and after adequate insufflation the varies needle was removed and a 5 mm trocar was placed without incident.  On inspection with the camera there was no evidence of any injury to bowel bladder or other internal organs.  The right lateral area was then explored where second stab incision was made and under direct visualization after quarter percent Marcaine with epinephrine was used for anesthesia a stab incision was made measuring 1-1/2 cm long and a 5 mm trocar was placed.  The same was done to the left lateral region again anesthetized with quarter percent Marcaine, a stab incision with a #11 blade and the trocar placed without difficulty.    The left adnexal region was first explored and the fimbriated end grasped with a grasper.  The ligasure was used to ligate and remove the distal 2/3 of the tube and then brought through 1 of the 5 mm ports without difficulty.  It was then placed in a specimen jar to be sent to  pathology.    The right adenexal region was then explored in a similar fashion, the fimbriated end grasped with a grasper and the LigaSure was used to ligate and remove the distal two thirds of the tube.  The tube was then removed through the one of the 5 mm ports and again placed in a specimen jar in formalin to be sent to pathology.    Inspection of the adnexal regions was then done and noted to have no active bleeding.  The instrument were removed and the abdomen was deflated to remove as much of the CO2 is possible.  The wounds were then closed with 4-0 Vicryl with one subcuticular stitch followed by some Dermabond to seal the wound.  More quarter percent Marcaine was used to anesthetize the incision sites.    The patient remained stable throughout the procedure.    Estimated blood loss: Less than 5 ml    Total fluids and: See anesthesia record.    Urine output: Patient was straight cathed in the operating room after she was prepped.  Please see anesthesia record for amount.    Complications: None    Discharge instructions: She will be discharged to home when she is fully awake and taking solids and liquids.    Discharge medications will be ibuprofen 800 mg p.o. every 6 hours as needed pain.  We will supplement pain with Percocet 325/5 mg 1 to 2 tablets every 4 hours as needed.  12.    Discharge diet: Advance as tolerated.    Discharge activity: She will lay low today and increase her activity as tolerated.  She is aware that she could have some left-sided anterior chest wall and shoulder pain due to any air this left in the abdominal cavity.  This should resolve within 24 hours.  By tomorrow she can resume more normal activities.    Follow-up: Will be with me in 2 weeks.    Electronically signed by:  Dwayne Campos M.D.  8/12/2019

## 2019-08-12 NOTE — DISCHARGE INSTRUCTIONS
[facility] Discharge Instructions     Discharge disposition:  Discharged to home       Diet:  Advance to a regular diet as tolerated       Activity  Rest today with increasing activity starting tomorrow.  She can resume all normal activities by postop day #2.       Follow-up: Follow up with primary care provider in 2-4 weeks       Additional instructions: Wound care / dressings: may shower and keep wound clean and dry      [facility] Same-Day Surgery   Adult Discharge Orders & Instructions     For 24 hours after surgery    1. Get plenty of rest.  A responsible adult must stay with you for at least 24 hours after you leave the hospital.   2. Do not drive or use heavy equipment.  If you have weakness or tingling, don't drive or use heavy equipment until this feeling goes away.  3. Do not drink alcohol.  4. Avoid strenuous or risky activities.  Ask for help when climbing stairs.   5. You may feel lightheaded.  If so, sit for a few minutes before standing.  Have someone help you get up.   6. You may have a slight fever. Call the doctor if your fever is over 100 F (37.7 C) (taken under the tongue) or lasts longer than 24 hours.  7. You may have a dry mouth, a sore throat, muscle aches or trouble sleeping.  These should go away after 24 hours.  8. Do not make important or legal decisions.  We don t expect you to have any problems from the surgery or treatment you had today. Just in case, here s what to do if you have pain, upset stomach (nausea), bleeding or infection:  Pain:  Take medicines your doctor has prescribed or over-the-counter medicine they have suggested. Resting and using ice packs can help, too. For surgery on an arm or leg, raise it on a pillow to ease swelling. Call your doctor if these methods don t work.  Copyright [employer], Licensed under [employer]  Upset stomach (nausea):  Take anti-nausea medicine approved by your doctor. Drink clear liquids like apple  juice, ginger ale, broth or 7-Up. Be sure to drink enough fluids. Rest can help, too. Move to normal foods when you re ready. Bleeding:  In the first 24 hours, you may see a little blood on your dressing, about the size of a quarter. You don t need to worry about this much blood, but if the blood spot keeps getting bigger:    Put pressure on the wound if you can, AND    Call your doctor.  Copyright [employer], Licensed under [employer]  Fever/Infection: Please call your doctor if you have any of these signs:    Redness    Swelling    Wound feels warm    Pain gets worse    Bad-smelling fluid leaks from wound    Fever or chills  Call your doctor for any of the followin.  It has been over 8 to 10 hours since surgery and you are still not able to urinate (pass water).    2.  Headache for over 24 hours.    3.  Numbness, tingling or weakness in your legs the day after surgery (if you had spinal anesthesia).    Nurse advice line: [phone]

## 2019-08-13 NOTE — OR NURSING
[facility] Same Day Surgery  Discharge Call Back  Leah Casarez  1985  MRN: [id number]  Home: [phone] (home) [phone] (work)  PCP: Dwayne Campos    We are calling to see how you're doing since your surgery/procedure with us?   Comments: just a little sore yet when I bend over  Clinical Questions  1. Have you had time to look at your discharge instructions? Do you have any questions in regards to the instructions?   Comment: yes, none  2. Do you feel your pain is being controlled with the regimen the surgeon sent you home on? (ie: prescription medications, over the counter pain medications, ice packs)   Comments: I'm just using Ibuprofen  3. Have you noticed any drainage on your dressing? Do you know what to do if you have bleeding as a result of your procedure?   Comments: none, yes  4. Have you had any nausea/vomiting? Do you know how to treat this?   Comment: none, yes  5. Have you had any signs/symptoms of infection? (ie: fever, swelling, heat, drainage or redness) Do you know what to do if you have?   Comment: none, yes  6. Do you have a follow up appointment made with your surgeon? Do you have a number to contact them at if you need it?   Comment: Dr. Campos told me Merry would call, and so I'm just waiting to hear from her.       You may be randomly selected to fill out a [facility] survey. We would appreciate you taking the time to fill this out. It is important to us if you would answer all of the questions on the survey.

## 2019-08-15 LAB — COPATH REPORT: NORMAL

## 2019-09-12 ENCOUNTER — MYC MEDICAL ADVICE (OUTPATIENT)
Dept: FAMILY MEDICINE | Facility: CLINIC | Age: 34
End: 2019-09-12

## 2019-09-17 ENCOUNTER — OFFICE VISIT (OUTPATIENT)
Dept: FAMILY MEDICINE | Facility: CLINIC | Age: 34
End: 2019-09-17
Payer: COMMERCIAL

## 2019-09-17 VITALS
SYSTOLIC BLOOD PRESSURE: 124 MMHG | DIASTOLIC BLOOD PRESSURE: 70 MMHG | HEART RATE: 84 BPM | BODY MASS INDEX: 36.97 KG/M2 | OXYGEN SATURATION: 99 % | WEIGHT: 246 LBS | RESPIRATION RATE: 18 BRPM | TEMPERATURE: 96.7 F

## 2019-09-17 DIAGNOSIS — F41.1 GAD (GENERALIZED ANXIETY DISORDER): Primary | ICD-10-CM

## 2019-09-17 DIAGNOSIS — F32.1 MODERATE MAJOR DEPRESSION (H): ICD-10-CM

## 2019-09-17 PROBLEM — S33.4XXA: Status: RESOLVED | Noted: 2017-11-01 | Resolved: 2019-09-17

## 2019-09-17 PROBLEM — Z34.90 PREGNANCY: Status: RESOLVED | Noted: 2019-04-02 | Resolved: 2019-09-17

## 2019-09-17 PROBLEM — N81.89 PELVIC RELAXATION DISORDER: Status: RESOLVED | Noted: 2018-09-24 | Resolved: 2019-09-17

## 2019-09-17 PROBLEM — Z34.90 SUPERVISION OF NORMAL PREGNANCY: Status: RESOLVED | Noted: 2018-09-24 | Resolved: 2019-09-17

## 2019-09-17 LAB
HGB BLD-MCNC: 12.3 G/DL (ref 11.7–15.7)
TSH SERPL DL<=0.005 MIU/L-ACNC: 1.28 MU/L (ref 0.4–4)

## 2019-09-17 PROCEDURE — 84443 ASSAY THYROID STIM HORMONE: CPT | Performed by: FAMILY MEDICINE

## 2019-09-17 PROCEDURE — 36415 COLL VENOUS BLD VENIPUNCTURE: CPT | Performed by: FAMILY MEDICINE

## 2019-09-17 PROCEDURE — 99214 OFFICE O/P EST MOD 30 MIN: CPT | Performed by: FAMILY MEDICINE

## 2019-09-17 PROCEDURE — 85018 HEMOGLOBIN: CPT | Performed by: FAMILY MEDICINE

## 2019-09-17 ASSESSMENT — ANXIETY QUESTIONNAIRES
2. NOT BEING ABLE TO STOP OR CONTROL WORRYING: NEARLY EVERY DAY
GAD7 TOTAL SCORE: 20
6. BECOMING EASILY ANNOYED OR IRRITABLE: NEARLY EVERY DAY
3. WORRYING TOO MUCH ABOUT DIFFERENT THINGS: NEARLY EVERY DAY
5. BEING SO RESTLESS THAT IT IS HARD TO SIT STILL: MORE THAN HALF THE DAYS
7. FEELING AFRAID AS IF SOMETHING AWFUL MIGHT HAPPEN: NEARLY EVERY DAY
1. FEELING NERVOUS, ANXIOUS, OR ON EDGE: NEARLY EVERY DAY

## 2019-09-17 ASSESSMENT — PATIENT HEALTH QUESTIONNAIRE - PHQ9
SUM OF ALL RESPONSES TO PHQ QUESTIONS 1-9: 12
5. POOR APPETITE OR OVEREATING: NEARLY EVERY DAY

## 2019-09-17 ASSESSMENT — PAIN SCALES - GENERAL: PAINLEVEL: NO PAIN (0)

## 2019-09-17 NOTE — PROGRESS NOTES
Subjective     Leah Casarez is a 34 year old female who presents to clinic today for the following health issues:    HPI   Anxiety/depression Follow-Up    How are you doing with your anxiety since your last visit? worse    Are you having other symptoms that might be associated with anxiety? No    Have you had a significant life event? No     Are you feeling depressed? Yes:  sometimes    Do you have any concerns with your use of alcohol or other drugs? No    Social History     Tobacco Use     Smoking status: Never Smoker     Smokeless tobacco: Never Used   Substance Use Topics     Alcohol use: No     Drug use: No     HOLLY-7 SCORE 9/17/2019   Total Score 20     PHQ 5/16/2019 9/17/2019   PHQ-9 Total Score 0 12   Q9: Thoughts of better off dead/self-harm past 2 weeks Not at all Not at all     HOLLY-7 SCORE 9/17/2019   Total Score 20         How many servings of fruits and vegetables do you eat daily?  2-3    On average, how many sweetened beverages do you drink each day (soda, juice, sweet tea, etc)?   1    How many days per week do you miss taking your medication? 0        PROBLEMS TO ADD ON...  Problem presents today for evaluation of depression and anxiety.  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  She had a third child 6 months ago and is a little overwhelmed with all of the increased work and responsibilities that a third child added.  Unfortunately she has not been getting from her  who plays summer and fall softball and is gone almost every weekend and at least once during the week or softball practice.  She has talked to him numerous times about being gone so much in the fact that she needs him home more frequently to help out.  This past week and she had an emotional breakdown and she thinks that he finally got the picture that she is not doing well.  She is actually told him that she might be  better off leaving him and moving in with her parents because they are willing to help her and he is never around to do that.  She is not sure that he will actually get the picture completely unless she actually takes the kids and leaves.  He is not abusive or neglectful to her in any way but just does not help out much because he is gone so much.  He plays softball from March through December every year.  She has given up all of her outdoor activities, i.e. volleyball and softball, since having children and he has been given up anything.  She is very tearful in the office today.  When I questioned about her family history she states that her brother who is 6 years younger than her does have depression and anxiety and is on medication.  Neither of her parents have either of these conditions.      Patient Active Problem List   Diagnosis     Gluten intolerance     Obesity (BMI 35.0-39.9 without comorbidity)     Seasonal allergic rhinitis, unspecified chronicity, unspecified trigger     CARDIOVASCULAR SCREENING; LDL GOAL LESS THAN 160     Sprain of neck     Segmental dysfunction of cervical region     Segmental dysfunction of thoracic region     Tension headache     Segmental dysfunction of sacral region     Segmental dysfunction of lumbar region     Lumbago     HOLLY (generalized anxiety disorder)     Moderate major depression (H)     Past Surgical History:   Procedure Laterality Date     LAPAROSCOPIC SALPINGECTOMY Bilateral 8/12/2019    Procedure: SALPINGECTOMY, LAPAROSCOPIC BILATERAL;  Surgeon: Dwayne Campos MD;  Location: [facility]     NO HISTORY OF SURGERY         Social History     Tobacco Use     Smoking status: Never Smoker     Smokeless tobacco: Never Used   Substance Use Topics     Alcohol use: No     Family History   Problem Relation Age of Onset     Arthritis Mother      No Known Problems Father      No Known Problems Maternal Grandmother      Diabetes Maternal Grandfather         Type II     Hyperlipidemia  Paternal Grandmother      No Known Problems Paternal Grandfather      Depression Brother         6 yrs younger     Anxiety Disorder Brother         on meds     No Known Problems Son      No Known Problems Son          No Known Allergies  Recent Labs   Lab Test 09/17/19  0925 09/24/18  1809   TSH 1.28 1.25      BP Readings from Last 3 Encounters:   09/17/19 124/70   08/12/19 112/58   08/06/19 118/72    Wt Readings from Last 3 Encounters:   09/17/19 111.6 kg (246 lb)   08/06/19 109.8 kg (242 lb)   07/31/19 109.8 kg (242 lb)         Reviewed and updated as needed this visit by Provider  Tobacco  Allergies  Meds  Problems  Med Hx  Surg Hx  Fam Hx         Review of Systems   ROS COMP: Constitutional, HEENT, cardiovascular, pulmonary, gi and gu systems are negative, except as otherwise noted.      Objective    /70   Pulse 84   Temp 96.7  F (35.9  C) (Temporal)   Resp 18   Wt 111.6 kg (246 lb)   SpO2 99%   BMI 36.97 kg/m    Body mass index is 36.97 kg/m .  Physical Exam   GENERAL: healthy, alert  but is very sad and tearful in the office today.  This is not her normal self.  She is normally very upbeat and happy and positive.  NECK: no adenopathy, no asymmetry, masses, or scars and thyroid normal to palpation  RESP: lungs clear to auscultation - no rales, rhonchi or wheezes  CV: regular rate and rhythm, normal S1 S2, no S3 or S4, no murmur, click or rub, no peripheral edema and peripheral pulses strong    Diagnostic Test Results:  Labs reviewed in Epic  Results for orders placed or performed in visit on 09/17/19 (from the past 24 hour(s))   TSH with free T4 reflex   Result Value Ref Range    TSH 1.28 0.40 - 4.00 mU/L   Hemoglobin   Result Value Ref Range    Hemoglobin 12.3 11.7 - 15.7 g/dL           Assessment & Plan     (F41.1) HOLLY (generalized anxiety disorder)  (primary encounter diagnosis)  Comment: Significant anxiety with a score of 20 on her HOLLY-7 today.  Plan: sertraline (ZOLOFT) 50 MG tablet,  TSH with free        T4 reflex, Hemoglobin        I think a lot of this is situational having the third child being overwhelmed with life in general and little support from her .  TSH and hemoglobin were normal today.  I am going to start her on sertraline 25 mg daily for 2 weeks then increase her to 50 mg.  She will take this at bedtime.    (F32.1) Moderate major depression (H)  Comment: Her depression score is elevated to at 12 but I think she is having more anxiety symptoms.  Plan: sertraline (ZOLOFT) 50 MG tablet, TSH with free        T4 reflex, Hemoglobin        Again the TSH and hemoglobin were normal, so we will wait to see how she does on the sertraline.  She will see me in 6 to 8 weeks and send me a my chart message in the next 2 to 3 weeks to let me know how things are going.  I reinforce the fact that she really needs to have a heart-to-heart with her  and that if she does not get things up she may need to just move out of the house and follow through with her threat.       Electronically signed by:  Dwayne Campos M.D.  9/17/2019

## 2019-09-18 ASSESSMENT — ANXIETY QUESTIONNAIRES: GAD7 TOTAL SCORE: 20

## 2019-10-28 ENCOUNTER — OFFICE VISIT (OUTPATIENT)
Dept: FAMILY MEDICINE | Facility: CLINIC | Age: 34
End: 2019-10-28
Payer: COMMERCIAL

## 2019-10-28 VITALS
WEIGHT: 239 LBS | BODY MASS INDEX: 35.92 KG/M2 | HEART RATE: 78 BPM | OXYGEN SATURATION: 100 % | RESPIRATION RATE: 18 BRPM | SYSTOLIC BLOOD PRESSURE: 102 MMHG | TEMPERATURE: 97 F | DIASTOLIC BLOOD PRESSURE: 68 MMHG

## 2019-10-28 DIAGNOSIS — F41.1 GAD (GENERALIZED ANXIETY DISORDER): ICD-10-CM

## 2019-10-28 DIAGNOSIS — F32.1 MODERATE MAJOR DEPRESSION (H): ICD-10-CM

## 2019-10-28 PROCEDURE — 96127 BRIEF EMOTIONAL/BEHAV ASSMT: CPT | Performed by: FAMILY MEDICINE

## 2019-10-28 PROCEDURE — 99213 OFFICE O/P EST LOW 20 MIN: CPT | Performed by: FAMILY MEDICINE

## 2019-10-28 ASSESSMENT — ANXIETY QUESTIONNAIRES
6. BECOMING EASILY ANNOYED OR IRRITABLE: SEVERAL DAYS
6. BECOMING EASILY ANNOYED OR IRRITABLE: SEVERAL DAYS
3. WORRYING TOO MUCH ABOUT DIFFERENT THINGS: MORE THAN HALF THE DAYS
5. BEING SO RESTLESS THAT IT IS HARD TO SIT STILL: NOT AT ALL
3. WORRYING TOO MUCH ABOUT DIFFERENT THINGS: MORE THAN HALF THE DAYS
IF YOU CHECKED OFF ANY PROBLEMS ON THIS QUESTIONNAIRE, HOW DIFFICULT HAVE THESE PROBLEMS MADE IT FOR YOU TO DO YOUR WORK, TAKE CARE OF THINGS AT HOME, OR GET ALONG WITH OTHER PEOPLE: NOT DIFFICULT AT ALL
7. FEELING AFRAID AS IF SOMETHING AWFUL MIGHT HAPPEN: MORE THAN HALF THE DAYS
7. FEELING AFRAID AS IF SOMETHING AWFUL MIGHT HAPPEN: MORE THAN HALF THE DAYS
IF YOU CHECKED OFF ANY PROBLEMS ON THIS QUESTIONNAIRE, HOW DIFFICULT HAVE THESE PROBLEMS MADE IT FOR YOU TO DO YOUR WORK, TAKE CARE OF THINGS AT HOME, OR GET ALONG WITH OTHER PEOPLE: NOT DIFFICULT AT ALL
GAD7 TOTAL SCORE: 8
2. NOT BEING ABLE TO STOP OR CONTROL WORRYING: SEVERAL DAYS
1. FEELING NERVOUS, ANXIOUS, OR ON EDGE: SEVERAL DAYS
2. NOT BEING ABLE TO STOP OR CONTROL WORRYING: SEVERAL DAYS
5. BEING SO RESTLESS THAT IT IS HARD TO SIT STILL: NOT AT ALL
1. FEELING NERVOUS, ANXIOUS, OR ON EDGE: SEVERAL DAYS
GAD7 TOTAL SCORE: 8

## 2019-10-28 ASSESSMENT — PATIENT HEALTH QUESTIONNAIRE - PHQ9
5. POOR APPETITE OR OVEREATING: SEVERAL DAYS
SUM OF ALL RESPONSES TO PHQ QUESTIONS 1-9: 7
5. POOR APPETITE OR OVEREATING: SEVERAL DAYS

## 2019-10-28 ASSESSMENT — PAIN SCALES - GENERAL: PAINLEVEL: NO PAIN (0)

## 2019-10-28 NOTE — PROGRESS NOTES
Subjective     Leah Casarez is a 34 year old female who presents to clinic today for the following health issues:    HPI   Anxiety Follow-Up    How are you doing with your anxiety since your last visit? Improved     Are you having other symptoms that might be associated with anxiety? No    Have you had a significant life event? Job Concerns and OTHER: .     Are you feeling depressed? No    Do you have any concerns with your use of alcohol or other drugs? No    Social History     Tobacco Use     Smoking status: Never Smoker     Smokeless tobacco: Never Used   Substance Use Topics     Alcohol use: No     Drug use: No     HOLLY-7 SCORE 9/17/2019 10/28/2019 10/28/2019   Total Score 20 8 8     PHQ 9/17/2019 10/28/2019 10/28/2019   PHQ-9 Total Score 12 - 7   Q9: Thoughts of better off dead/self-harm past 2 weeks Not at all Not at all Not at all       How many servings of fruits and vegetables do you eat daily?  2-3    On average, how many sweetened beverages do you drink each day (soda, juice, sweet tea, etc)?   1  How many days per week do you miss taking your medication? 1    What makes it hard for you to take your medications?  remembering to take        PROBLEMS TO ADD ON...  No new concerns, she feels much much better on the medication  notices also.  She still little tired but mainly because her 9-month-old and 22-month-old still did not sleep the night so that is where her sleep issues come in.  She is not getting so worked up about small things anymore and just feels like her emotions are well in check.    Patient Active Problem List   Diagnosis     Gluten intolerance     Obesity (BMI 35.0-39.9 without comorbidity)     Seasonal allergic rhinitis, unspecified chronicity, unspecified trigger     CARDIOVASCULAR SCREENING; LDL GOAL LESS THAN 160     Sprain of neck     Segmental dysfunction of cervical region     Segmental dysfunction of thoracic region     Tension headache     Segmental dysfunction of  sacral region     Segmental dysfunction of lumbar region     Lumbago     HOLLY (generalized anxiety disorder)     Moderate major depression (H)     Past Surgical History:   Procedure Laterality Date     LAPAROSCOPIC SALPINGECTOMY Bilateral 8/12/2019    Procedure: SALPINGECTOMY, LAPAROSCOPIC BILATERAL;  Surgeon: Dwayne Campos MD;  Location: [facility]     NO HISTORY OF SURGERY         Social History     Tobacco Use     Smoking status: Never Smoker     Smokeless tobacco: Never Used   Substance Use Topics     Alcohol use: No     Family History   Problem Relation Age of Onset     Arthritis Mother      No Known Problems Father      No Known Problems Maternal Grandmother      Diabetes Maternal Grandfather         Type II     Hyperlipidemia Paternal Grandmother      No Known Problems Paternal Grandfather      Depression Brother         6 yrs younger     Anxiety Disorder Brother         on meds     No Known Problems Son      No Known Problems Son          Current Outpatient Medications   Medication Sig Dispense Refill     Prenatal Vit-Fe Fumarate-FA (PRENATAL VITAMIN PO) Take 1 tablet by mouth daily        sertraline (ZOLOFT) 50 MG tablet Take 1 tablet (50 mg) by mouth daily 90 tablet 3     No Known Allergies  Recent Labs   Lab Test 09/17/19  0925 09/24/18  1809   TSH 1.28 1.25      BP Readings from Last 3 Encounters:   10/28/19 102/68   09/17/19 124/70   08/12/19 112/58    Wt Readings from Last 3 Encounters:   10/28/19 108.4 kg (239 lb)   09/17/19 111.6 kg (246 lb)   08/06/19 109.8 kg (242 lb)        Reviewed and updated as needed this visit by Provider         Review of Systems   ROS COMP: Constitutional, HEENT, cardiovascular, pulmonary, gi and gu systems are negative, except as otherwise noted.      Objective    /68   Pulse 78   Temp 97  F (36.1  C) (Temporal)   Resp 18   Wt 108.4 kg (239 lb)   SpO2 100%   BMI 35.92 kg/m    Body mass index is 35.92 kg/m .  Physical Exam   GENERAL: healthy, alert and no  distress  No exam was indicated today.  He was just a follow-up medication check and to  redo her PHQ 9 and HOLLY 7's.    Diagnostic Test Results:  Labs reviewed in Epic        Assessment & Plan     (F41.1) HOLLY (generalized anxiety disorder)  (F32.1) Moderate major depression (H)  Comment: Overall much improved.  Her scores are very much improved since her last visit.  Plan: sertraline (ZOLOFT) 50 MG tablet        We will continue her on the 50 mg of sertraline daily.  I told her that minimum of 6 months to see how she does.  If at 6 months she wants to decrease to 25 mg he can do that we will be discussing that in the near future to see how she feels about changing the dose or discontinuing on the current dose.        Return in about 6 months (around 4/28/2020).    Dwayne Campos MD, MD  [facility]

## 2019-10-29 ASSESSMENT — ANXIETY QUESTIONNAIRES: GAD7 TOTAL SCORE: 8
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
## 2019-11-05 ENCOUNTER — TELEPHONE (OUTPATIENT)
Dept: FAMILY MEDICINE | Facility: CLINIC | Age: 34
End: 2019-11-05

## 2019-11-05 ENCOUNTER — OFFICE VISIT (OUTPATIENT)
Dept: FAMILY MEDICINE | Facility: CLINIC | Age: 34
End: 2019-11-05
Payer: OTHER MISCELLANEOUS

## 2019-11-05 ENCOUNTER — HOSPITAL ENCOUNTER (OUTPATIENT)
Dept: GENERAL RADIOLOGY | Facility: CLINIC | Age: 34
End: 2019-11-05
Attending: FAMILY MEDICINE
Payer: OTHER MISCELLANEOUS

## 2019-11-05 VITALS
WEIGHT: 242 LBS | DIASTOLIC BLOOD PRESSURE: 84 MMHG | BODY MASS INDEX: 36.37 KG/M2 | HEART RATE: 80 BPM | OXYGEN SATURATION: 98 % | SYSTOLIC BLOOD PRESSURE: 134 MMHG | RESPIRATION RATE: 18 BRPM | TEMPERATURE: 97 F

## 2019-11-05 DIAGNOSIS — S09.93XA INJURY OF JAW, INITIAL ENCOUNTER: Primary | ICD-10-CM

## 2019-11-05 DIAGNOSIS — S09.93XA INJURY OF JAW, INITIAL ENCOUNTER: ICD-10-CM

## 2019-11-05 PROCEDURE — 70110 X-RAY EXAM OF JAW 4/> VIEWS: CPT | Mod: TC

## 2019-11-05 PROCEDURE — 99213 OFFICE O/P EST LOW 20 MIN: CPT | Performed by: FAMILY MEDICINE

## 2019-11-05 ASSESSMENT — ANXIETY QUESTIONNAIRES
1. FEELING NERVOUS, ANXIOUS, OR ON EDGE: SEVERAL DAYS
6. BECOMING EASILY ANNOYED OR IRRITABLE: SEVERAL DAYS
GAD7 TOTAL SCORE: 6
GAD7 TOTAL SCORE: 6
7. FEELING AFRAID AS IF SOMETHING AWFUL MIGHT HAPPEN: SEVERAL DAYS
4. TROUBLE RELAXING: SEVERAL DAYS
7. FEELING AFRAID AS IF SOMETHING AWFUL MIGHT HAPPEN: SEVERAL DAYS
GAD7 TOTAL SCORE: 6
5. BEING SO RESTLESS THAT IT IS HARD TO SIT STILL: NOT AT ALL
2. NOT BEING ABLE TO STOP OR CONTROL WORRYING: SEVERAL DAYS
3. WORRYING TOO MUCH ABOUT DIFFERENT THINGS: SEVERAL DAYS

## 2019-11-05 ASSESSMENT — PAIN SCALES - GENERAL: PAINLEVEL: SEVERE PAIN (7)

## 2019-11-05 ASSESSMENT — PATIENT HEALTH QUESTIONNAIRE - PHQ9
10. IF YOU CHECKED OFF ANY PROBLEMS, HOW DIFFICULT HAVE THESE PROBLEMS MADE IT FOR YOU TO DO YOUR WORK, TAKE CARE OF THINGS AT HOME, OR GET ALONG WITH OTHER PEOPLE: SOMEWHAT DIFFICULT
SUM OF ALL RESPONSES TO PHQ QUESTIONS 1-9: 5
SUM OF ALL RESPONSES TO PHQ QUESTIONS 1-9: 5

## 2019-11-05 NOTE — TELEPHONE ENCOUNTER
Reason for Call:  Same Day Appointment, Requested Provider:  anyone    PCP: Dwayne Campos    Reason for visit: pt hurt her jaw at work and is wanting to be seen today in [place]     Duration of symptoms: today     Have you been treated for this in the past? No    Additional comments: n/a     Can we leave a detailed message on this number? YES    Phone number patient can be reached at: Home number on file [phone] (home)    Best Time: asap     Call taken on 11/5/2019 at 2:36 PM by Kristen Sierra
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
## 2019-11-06 ASSESSMENT — ANXIETY QUESTIONNAIRES: GAD7 TOTAL SCORE: 6

## 2019-11-06 ASSESSMENT — PATIENT HEALTH QUESTIONNAIRE - PHQ9: SUM OF ALL RESPONSES TO PHQ QUESTIONS 1-9: 5

## 2019-11-07 ENCOUNTER — ANCILLARY PROCEDURE (OUTPATIENT)
Dept: GENERAL RADIOLOGY | Facility: OTHER | Age: 34
End: 2019-11-07
Attending: PHYSICIAN ASSISTANT
Payer: COMMERCIAL

## 2019-11-07 ENCOUNTER — OFFICE VISIT (OUTPATIENT)
Dept: FAMILY MEDICINE | Facility: OTHER | Age: 34
End: 2019-11-07
Payer: COMMERCIAL

## 2019-11-07 VITALS
DIASTOLIC BLOOD PRESSURE: 80 MMHG | SYSTOLIC BLOOD PRESSURE: 128 MMHG | TEMPERATURE: 97.1 F | OXYGEN SATURATION: 97 % | HEART RATE: 78 BPM | HEIGHT: 68 IN | RESPIRATION RATE: 16 BRPM | BODY MASS INDEX: 36.68 KG/M2 | WEIGHT: 242 LBS

## 2019-11-07 DIAGNOSIS — S49.91XA INJURY OF RIGHT CLAVICLE, INITIAL ENCOUNTER: Primary | ICD-10-CM

## 2019-11-07 DIAGNOSIS — S49.91XA INJURY OF RIGHT CLAVICLE, INITIAL ENCOUNTER: ICD-10-CM

## 2019-11-07 DIAGNOSIS — S49.91XA SHOULDER INJURY, RIGHT, INITIAL ENCOUNTER: ICD-10-CM

## 2019-11-07 PROCEDURE — 73030 X-RAY EXAM OF SHOULDER: CPT | Mod: RT

## 2019-11-07 PROCEDURE — 99214 OFFICE O/P EST MOD 30 MIN: CPT | Performed by: PHYSICIAN ASSISTANT

## 2019-11-07 PROCEDURE — 73000 X-RAY EXAM OF COLLAR BONE: CPT | Mod: RT

## 2019-11-07 RX ORDER — IBUPROFEN 800 MG/1
TABLET, FILM COATED ORAL
COMMUNITY
Start: 2019-08-12 | End: 2020-01-31

## 2019-11-07 ASSESSMENT — PAIN SCALES - GENERAL: PAINLEVEL: MODERATE PAIN (5)

## 2019-11-07 ASSESSMENT — MIFFLIN-ST. JEOR: SCORE: 1862.55

## 2019-11-08 NOTE — RESULT ENCOUNTER NOTE
[place]    Your results were normal.     The results are attached for your review.       Shawn Cuellar PA-C

## 2019-11-22 ENCOUNTER — IMMUNIZATION (OUTPATIENT)
Dept: FAMILY MEDICINE | Facility: OTHER | Age: 34
End: 2019-11-22
Payer: COMMERCIAL

## 2019-11-22 DIAGNOSIS — Z23 NEED FOR PROPHYLACTIC VACCINATION AND INOCULATION AGAINST INFLUENZA: Primary | ICD-10-CM

## 2019-11-22 PROCEDURE — 99207 ZZC NO CHARGE NURSE ONLY: CPT

## 2019-11-22 PROCEDURE — 90471 IMMUNIZATION ADMIN: CPT

## 2019-11-22 PROCEDURE — 90686 IIV4 VACC NO PRSV 0.5 ML IM: CPT

## 2020-01-14 ENCOUNTER — TELEPHONE (OUTPATIENT)
Dept: FAMILY MEDICINE | Facility: CLINIC | Age: 35
End: 2020-01-14

## 2020-01-31 ENCOUNTER — OFFICE VISIT (OUTPATIENT)
Dept: INTERNAL MEDICINE | Facility: CLINIC | Age: 35
End: 2020-01-31
Payer: COMMERCIAL

## 2020-01-31 VITALS
OXYGEN SATURATION: 98 % | WEIGHT: 236 LBS | HEART RATE: 100 BPM | BODY MASS INDEX: 35.77 KG/M2 | SYSTOLIC BLOOD PRESSURE: 118 MMHG | HEIGHT: 68 IN | DIASTOLIC BLOOD PRESSURE: 66 MMHG | RESPIRATION RATE: 16 BRPM | TEMPERATURE: 96 F

## 2020-01-31 DIAGNOSIS — R10.84 ABDOMINAL PAIN, GENERALIZED: Primary | ICD-10-CM

## 2020-01-31 DIAGNOSIS — R30.0 DYSURIA: ICD-10-CM

## 2020-01-31 LAB
ALBUMIN SERPL-MCNC: 4 G/DL (ref 3.4–5)
ALBUMIN UR-MCNC: NEGATIVE MG/DL
ALP SERPL-CCNC: 83 U/L (ref 40–150)
ALT SERPL W P-5'-P-CCNC: 39 U/L (ref 0–50)
ANION GAP SERPL CALCULATED.3IONS-SCNC: 5 MMOL/L (ref 3–14)
APPEARANCE UR: CLEAR
AST SERPL W P-5'-P-CCNC: 21 U/L (ref 0–45)
BILIRUB SERPL-MCNC: 0.4 MG/DL (ref 0.2–1.3)
BILIRUB UR QL STRIP: NEGATIVE
BUN SERPL-MCNC: 12 MG/DL (ref 7–30)
CALCIUM SERPL-MCNC: 9.1 MG/DL (ref 8.5–10.1)
CHLORIDE SERPL-SCNC: 104 MMOL/L (ref 94–109)
CO2 SERPL-SCNC: 29 MMOL/L (ref 20–32)
COLOR UR AUTO: COLORLESS
CREAT SERPL-MCNC: 0.74 MG/DL (ref 0.52–1.04)
GFR SERPL CREATININE-BSD FRML MDRD: >90 ML/MIN/{1.73_M2}
GLUCOSE SERPL-MCNC: 91 MG/DL (ref 70–99)
GLUCOSE UR STRIP-MCNC: NEGATIVE MG/DL
HGB UR QL STRIP: NEGATIVE
KETONES UR STRIP-MCNC: NEGATIVE MG/DL
LEUKOCYTE ESTERASE UR QL STRIP: NEGATIVE
NITRATE UR QL: NEGATIVE
PH UR STRIP: 8 PH (ref 5–7)
POTASSIUM SERPL-SCNC: 3.8 MMOL/L (ref 3.4–5.3)
PROT SERPL-MCNC: 7.8 G/DL (ref 6.8–8.8)
SODIUM SERPL-SCNC: 138 MMOL/L (ref 133–144)
SOURCE: ABNORMAL
SP GR UR STRIP: 1 (ref 1–1.03)
UROBILINOGEN UR STRIP-MCNC: 0 MG/DL (ref 0–2)

## 2020-01-31 PROCEDURE — 80053 COMPREHEN METABOLIC PANEL: CPT | Performed by: INTERNAL MEDICINE

## 2020-01-31 PROCEDURE — 36415 COLL VENOUS BLD VENIPUNCTURE: CPT | Performed by: INTERNAL MEDICINE

## 2020-01-31 PROCEDURE — 99214 OFFICE O/P EST MOD 30 MIN: CPT | Performed by: INTERNAL MEDICINE

## 2020-01-31 PROCEDURE — 81003 URINALYSIS AUTO W/O SCOPE: CPT | Performed by: INTERNAL MEDICINE

## 2020-01-31 ASSESSMENT — PAIN SCALES - GENERAL: PAINLEVEL: MODERATE PAIN (5)

## 2020-01-31 ASSESSMENT — MIFFLIN-ST. JEOR: SCORE: 1832.96

## 2020-01-31 NOTE — PROGRESS NOTES
"Subjective     Leah Casarez is a 34 year old female who presents to clinic today for the following health issues:    HPI   Chief Complaint   Patient presents with     Abdominal Pain     mid abdominal pain, started on Monday - seems worse after eating     She has had some abdominal pains since Monday, goes to the right side of her back and into her groin are.  She had cramping and different pain.  No vomiting, some nausea.  Less appetite.  Normal BM, no blood in the urine, feels some pressure.     Diet has been high protein, coworker went in with gallstones.  Was doing protein and veggies, day 18.  Hadn't had dairy and fruit either.      No alcohol, no tobacco, no caffeine, no nsaids before this.     Past Medical History:   Diagnosis Date     Dislocation of symphysis pubis, initial encounter 11/1/2017     NO ACTIVE PROBLEMS (aka NONE)      Current Outpatient Medications   Medication     sertraline (ZOLOFT) 50 MG tablet     No current facility-administered medications for this visit.      Social History     Tobacco Use     Smoking status: Never Smoker     Smokeless tobacco: Never Used   Substance Use Topics     Alcohol use: No     Drug use: No     Review of Systems  Constitutional-No fevers, chills, or weight changes..  Cardiac-No chest pain or palpitations.  Respiratory-No cough, sob, or hemoptysis.  GI-Nausea.  -Dysuria.    Physical Exam  /66   Pulse 100   Temp 96  F (35.6  C) (Temporal)   Resp 16   Ht 1.734 m (5' 8.25\")   Wt 107 kg (236 lb)   SpO2 98%   BMI 35.62 kg/m    General Appearance-healthy, alert, no distress  Cardiac-regular rate and rhythm  with normal S1, S2 ; no murmur, rub or gallops  Lungs-clear to auscultation  GI-Soft, nontender.  Normal bowel sounds.  No hepatosplenomegaly or abnormal masses    ASSESSMENT:  Normally healthy 34-year-old woman who comes in with 4 to 5 days of lower abdominal pain kind of mid abdominal pain some pain into the right flank coming around into the " groin.  She has slight abdomen normal urine but no real pain with urination no blood in her urine.  She has been on a keto type diet for the last 18 days.  Patient is concerned about gallstones but she really has no pain in the right upper quadrant.  She has no symptoms after eating.    I am more concerned about her abnormal urine symptoms and some suprapubic pain which is mild.  She also has some right CVA tenderness to palpation.  I think she could have a kidney stone we will check her urinalysis, labs her creatinine and LFTs.  We discussed whether she should do a CT scan today and she wants to hold off and see if she still needs it next week.  In the meantime she will push fluids and manage her pain on her own.      Electronically signed by Alexander Padilla MD

## 2020-03-03 ENCOUNTER — OFFICE VISIT (OUTPATIENT)
Dept: URGENT CARE | Facility: RETAIL CLINIC | Age: 35
End: 2020-03-03
Payer: COMMERCIAL

## 2020-03-03 VITALS — TEMPERATURE: 98 F | HEART RATE: 79 BPM | SYSTOLIC BLOOD PRESSURE: 116 MMHG | DIASTOLIC BLOOD PRESSURE: 74 MMHG

## 2020-03-03 DIAGNOSIS — J02.9 ACUTE PHARYNGITIS, UNSPECIFIED ETIOLOGY: Primary | ICD-10-CM

## 2020-03-03 LAB — S PYO AG THROAT QL IA.RAPID: NORMAL

## 2020-03-03 PROCEDURE — 99213 OFFICE O/P EST LOW 20 MIN: CPT | Performed by: INTERNAL MEDICINE

## 2020-03-03 PROCEDURE — 87880 STREP A ASSAY W/OPTIC: CPT | Mod: QW | Performed by: INTERNAL MEDICINE

## 2020-03-03 PROCEDURE — 87081 CULTURE SCREEN ONLY: CPT | Performed by: INTERNAL MEDICINE

## 2020-03-04 NOTE — PROGRESS NOTES
[facility] Care Progress Note        Karmen Goldsmith MD, MPH  03/03/2020        History:      Leah Casarez is a pleasant 34 year old year old female with a chief complaint of nasal congestion and sore throat since 3 days ago.   No fever or chills.   No dyspnea or wheezing.   No smoking history.   No headache or neck pain.  No GI or  symptoms.   No MSK symptoms.         Assessment and Plan:        - Rapid Strep Screen Throat Swab: negative.  - BETA STREP GROUP A R/O CULTURE  URI:  Wash hands w soap and water frequently.  Discussed supportive care with the patient  Advised to increase fluid intake and rest the next 3 days.  Patient was advised to use throat lozenges and gargle with salt water for symptomatic relief.  Tylenol 650 mg PO for pain/fever q 6 hours as needed.  F/u w PCP in 4-5 days, earlier if symptoms worsen.                   Physical Exam:      /74 (BP Location: Left arm)   Pulse 79   Temp 98  F (36.7  C) (Oral)      Constitutional: Patient is in no distress The patient is pleasant and cooperative.   HEENT: Head:  Head is atraumatic, normocephalic.    Eyes: Pupils are equal, round and reactive to light and accomodation.  Sclera is non-icteric. No conjunctival injection, or exudate noted. Extraocular motion is intact. Visual acuity is intact bilaterally.  Ears:  External acoustic canals are patent and clear.  There is no erythema or bulging of the tympanic membranes.  No swelling, erythema or tenderness upon palpation of the mastoid processes are noted.  Nose:  Nasal congestion w/o drainage or mucosal ulceration is noted.  Throat:  Oral mucosa is moist.  No oral lesions are noted. Posterior pharyngeal hyperemia w/o exudate noted.     Neck Supple.  There is no cervical / Postauricular lymphadenopathy.  No nuchal rigidity noted.  There is no meningismus.     Cardiovascular: Heart is regular to rate and rhythm.  No murmur is noted.     Lungs: Clear in the anterior and  posterior pulmonary fields.   Abdomen: Soft and non-tender.    Back No flank tenderness is noted.   Extremeties No edema, no calf tenderness.   Neuro: No focal deficit.   Skin No petechiae or purpura is noted.  There is no rash.   Mood Normal              Data:      All new lab and imaging data was reviewed.   Results for orders placed or performed in visit on 03/03/20   Rapid Strep Screen Throat Swab     Status: Normal   Result Value Ref Range    Rapid Strep A Screen neg neg

## 2020-03-06 ENCOUNTER — OFFICE VISIT (OUTPATIENT)
Dept: URGENT CARE | Facility: RETAIL CLINIC | Age: 35
End: 2020-03-06
Payer: COMMERCIAL

## 2020-03-06 VITALS
TEMPERATURE: 98.2 F | OXYGEN SATURATION: 99 % | SYSTOLIC BLOOD PRESSURE: 115 MMHG | HEART RATE: 65 BPM | DIASTOLIC BLOOD PRESSURE: 73 MMHG

## 2020-03-06 DIAGNOSIS — H66.012 NON-RECURRENT ACUTE SUPPURATIVE OTITIS MEDIA OF LEFT EAR WITH SPONTANEOUS RUPTURE OF TYMPANIC MEMBRANE: Primary | ICD-10-CM

## 2020-03-06 DIAGNOSIS — H60.502 ACUTE OTITIS EXTERNA OF LEFT EAR, UNSPECIFIED TYPE: ICD-10-CM

## 2020-03-06 LAB
BACTERIA SPEC CULT: NORMAL
SPECIMEN SOURCE: NORMAL

## 2020-03-06 PROCEDURE — 99213 OFFICE O/P EST LOW 20 MIN: CPT | Performed by: NURSE PRACTITIONER

## 2020-03-06 RX ORDER — OMEGA-3 FATTY ACIDS/FISH OIL 300-1000MG
200 CAPSULE ORAL EVERY 4 HOURS PRN
COMMUNITY

## 2020-03-06 RX ORDER — CIPROFLOXACIN AND DEXAMETHASONE 3; 1 MG/ML; MG/ML
4 SUSPENSION/ DROPS AURICULAR (OTIC) 2 TIMES DAILY
Qty: 2.8 ML | Refills: 0 | Status: SHIPPED | OUTPATIENT
Start: 2020-03-06 | End: 2020-03-13

## 2020-03-06 ASSESSMENT — ENCOUNTER SYMPTOMS
SLEEP DISTURBANCE: 0
SINUS PAIN: 1
ADENOPATHY: 1
CHEST TIGHTNESS: 0
WHEEZING: 0
SORE THROAT: 1
CHILLS: 0
SHORTNESS OF BREATH: 0
COUGH: 1
ACTIVITY CHANGE: 0
FATIGUE: 0
SINUS PRESSURE: 1
HEADACHES: 1
FEVER: 0
DIAPHORESIS: 0

## 2020-03-06 NOTE — PATIENT INSTRUCTIONS
Ciprodex and Augmentin for left ear infection with possible perforation.  Tylenol and/or motrin for pain relief and fever reduction.  Warm compresses next to ear for pain relief.  Drink plenty of fluids and place a humidifier in bedroom.  Mucinex to help reduce fluid in ears (guiafenasin is the generic).  Ear infections are not contagious.  Keep ear canal dry for one week.  Zyrtec and benadryl for fluid and pressure.  Follow up with primary care provider 10-14 days after starting the antibiotic with any concern of persistent infection.

## 2020-03-09 ENCOUNTER — TELEPHONE (OUTPATIENT)
Dept: FAMILY MEDICINE | Facility: CLINIC | Age: 35
End: 2020-03-09

## 2020-03-09 DIAGNOSIS — H93.12 TINNITUS, LEFT: ICD-10-CM

## 2020-03-09 DIAGNOSIS — H93.19 RINGING IN EAR: Primary | ICD-10-CM

## 2020-03-09 NOTE — TELEPHONE ENCOUNTER
The pain is manageable but she still has high pitched sounds and really muffled. She is still on the antibiotics and ear drops.    She wants to know if this is normal or if she should be seen.  MP/MA

## 2020-03-09 NOTE — TELEPHONE ENCOUNTER
Per Dr Campos    Continue the ear drops and antibiotics and she needs to see ENT soon.     Patient was notified.  MP/MA

## 2020-03-09 NOTE — TELEPHONE ENCOUNTER
Seen Friday inner and outer infection, ruptured ear drum.   Muffled hearing, ear drops not going into canal  Ringing   Reason for call:  Patient reporting a symptom    Symptom or request: left ear has inner and outer infection, ear drum ruptured. States hearing is muffled, and when she places ear drops they are sitting in the canal and not going down into the ear. Has had 7 doses of antibiotic.     Duration (how long have symptoms been present): was seen Friday 3/[facility] at [facility].    Have you been treated for this before? Yes    Additional comments: asking to talk with Dr Campos about these issues.     Phone Number patient can be reached at:  Work number on file:  [phone] (work)    Best Time:  Any time    Can we leave a detailed message on this number:  YES    Call taken on 3/9/2020 at 9:12 AM by Arianna Dominguez

## 2020-03-11 ENCOUNTER — OFFICE VISIT (OUTPATIENT)
Dept: FAMILY MEDICINE | Facility: CLINIC | Age: 35
End: 2020-03-11
Payer: COMMERCIAL

## 2020-03-11 ENCOUNTER — MYC MEDICAL ADVICE (OUTPATIENT)
Dept: FAMILY MEDICINE | Facility: CLINIC | Age: 35
End: 2020-03-11

## 2020-03-11 VITALS
BODY MASS INDEX: 35.95 KG/M2 | DIASTOLIC BLOOD PRESSURE: 74 MMHG | SYSTOLIC BLOOD PRESSURE: 118 MMHG | OXYGEN SATURATION: 97 % | TEMPERATURE: 97.4 F | RESPIRATION RATE: 17 BRPM | HEART RATE: 97 BPM | WEIGHT: 238.2 LBS

## 2020-03-11 DIAGNOSIS — H69.93 DYSFUNCTION OF BOTH EUSTACHIAN TUBES: ICD-10-CM

## 2020-03-11 DIAGNOSIS — F32.1 MODERATE MAJOR DEPRESSION (H): ICD-10-CM

## 2020-03-11 DIAGNOSIS — H66.012 NON-RECURRENT ACUTE SUPPURATIVE OTITIS MEDIA OF LEFT EAR WITH SPONTANEOUS RUPTURE OF TYMPANIC MEMBRANE: ICD-10-CM

## 2020-03-11 DIAGNOSIS — H93.12 TINNITUS, LEFT: ICD-10-CM

## 2020-03-11 PROCEDURE — 99213 OFFICE O/P EST LOW 20 MIN: CPT | Performed by: FAMILY MEDICINE

## 2020-03-11 RX ORDER — FLUTICASONE PROPIONATE 50 MCG
2 SPRAY, SUSPENSION (ML) NASAL DAILY
Qty: 16 G | Refills: 3 | Status: SHIPPED | OUTPATIENT
Start: 2020-03-11 | End: 2022-01-28

## 2020-03-11 ASSESSMENT — PAIN SCALES - GENERAL: PAINLEVEL: SEVERE PAIN (7)

## 2020-03-11 NOTE — TELEPHONE ENCOUNTER
Patient has ENT appointment scheduled for Monday but is asking about symptoms that she is having.  Please review.   [facility]

## 2020-03-11 NOTE — TELEPHONE ENCOUNTER
We need to look in her ears to see what is going on.  She could come at 2 or 2:30 pm.    Electronically signed by:  Dwayne Campos M.D.  3/11/2020

## 2020-03-11 NOTE — PROGRESS NOTES
Subjective     Leah Casarez is a 34 year old female who presents to clinic today for the following health issues:    HPI   Concern - ears ringing popping  Increased pain no longer muffled hearing piercing nosies as well  Onset: last thursday    Description:   See CC    Intensity: moderate, severe    Progression of Symptoms:  worsening, same, constant and intermittent    Accompanying Signs & Symptoms:  Hurts all the way down throat and jaw. Sinuses as well on left side.    Previous history of similar problem:   multiple ear infections and 2 times ruptured ear drums    Precipitating factors:   Worsened by: laying down and loud noises    Alleviating factors:  Improved by: nothing    Therapies Tried and outcome: antibiotic helped some, pain killers such as ibuprofen managed with that right now.    Reviewed and updated as needed this visit by Provider  Tobacco  Allergies  Meds  Problems  Med Hx  Surg Hx  Fam Hx         Review of Systems   ROS COMP: Constitutional, HEENT, cardiovascular, pulmonary, gi and gu systems are negative, except as otherwise noted.      Objective    /74   Pulse 97   Temp 97.4  F (36.3  C) (Temporal)   Resp 17   Wt 108 kg (238 lb 3.2 oz)   SpO2 97%   BMI 35.95 kg/m    Body mass index is 35.95 kg/m .  Physical Exam   GENERAL: healthy, alert and no distress  HENT: right ear: She does have some serous fluid behind the right TM but there is no erythema or evidence of infection, left ear: Left TM is still mildly erythematous and thickened with what appears to be thicker fluid behind the TM.  There is an area in the inferior anterior aspect of the TM where there is a small scab, most likely from where she had a TM rupture.  The canal is otherwise clear.  Her nasal mucosa erythematous and edematous with thick yellow rhinorrhea, oropharynx clear and oral mucous membranes moist  NECK: no adenopathy, no asymmetry, masses, or scars and thyroid normal to palpation        Assessment &  "Plan     (H66.012) follow up Non-recurrent acute suppurative otitis media of left ear with spontaneous rupture of tympanic membrane  (H69.83) Dysfunction of both eustachian tubes  (H93.12) Tinnitus, left  Comment: Her TM is now healing, there is a small scab over the area where she had the rupture.  There is still what appears to be some thicker fluid behind the TM and erythema and thickening of the membrane itself.  She is only been on antibiotics for 5 days  Plan: She has an appointment with ENT this coming week which I want her to keep.  I especially want him to address the tinnitus which she still states is present.  She says the ringing in that left ear is significant. The muffled sensation which is most likely due to her eustachian tube dysfunction and the trapped fluid behind the TM.  I am going to start her on fluticasone (FLONASE) 50 MCG/ACT nasal spray.  I did instruct her on proper use.  She needs to keep the bottle upright.  She will image the back of her throat and then bend at the waist so that the bottle is upright and as she snorts will keep the other nostril plugs that she gets that medicine towards the back of the throat.  She repeated on both sides for a total of 2 sprays each side at bedtime.    (F32.1) Moderate major depression (H)  Comment: Her depression is been stable on her sertraline.  Plan: No change in her dose.       BMI:   Estimated body mass index is 35.95 kg/m  as calculated from the following:    Height as of 1/31/20: 1.734 m (5' 8.25\").    Weight as of this encounter: 108 kg (238 lb 3.2 oz).   Weight management plan: Not addressed today.    Return if symptoms worsen or fail to improve, for [facility]NT on Monday.    Electronically signed by:  Dwayne Campos M.D.  3/11/2020      "

## 2020-03-13 NOTE — PROGRESS NOTES
ENT Consultation    Leah Casarez who is a 34 year old female seen in consultation at the request of Dr. Campos pressure in the ear with loss of hearing..      History of Present Illness - Leah Casarez is a 34 year old female presents for evaluation of basically started last week and end of the week she felt pressure pain decreased hearing in the left ear.  She used to have a lot of ear infections but last was over 2 years ago.  Also gets more nasal congestion lately.  Special left side is more congested.  Started more with upper respite infection congestion.  She was started on Augmentin about 4 days ago.  She also started Flonase which does not help with congestion at all.  Feels some clear postnasal drainage.  No sinus pressure.  No fever no chills.      Past Medical History -   Past Medical History:   Diagnosis Date     Dislocation of symphysis pubis, initial encounter 11/1/2017     NO ACTIVE PROBLEMS (aka NONE)        Current Medications -   Current Outpatient Medications:      amoxicillin-clavulanate (AUGMENTIN) 875-125 MG tablet, Take 1 tablet by mouth 2 times daily for 7 days, Disp: 14 tablet, Rfl: 0     ciprofloxacin-dexamethasone (CIPRODEX) 0.3-0.1 % otic suspension, Place 4 drops Into the left ear 2 times daily for 7 days, Disp: 2.8 mL, Rfl: 0     fluticasone (FLONASE) 50 MCG/ACT nasal spray, Spray 2 sprays into both nostrils daily, Disp: 16 g, Rfl: 3     ibuprofen (ADVIL/MOTRIN) 200 MG capsule, Take 200 mg by mouth every 4 hours as needed for fever, Disp: , Rfl:      sertraline (ZOLOFT) 50 MG tablet, Take 1 tablet (50 mg) by mouth daily, Disp: 90 tablet, Rfl: 3    Allergies - No Known Allergies    Social History -   Social History     Socioeconomic History     Marital status:      Spouse name: Not on file     Number of children: Not on file     Years of education: Not on file     Highest education level: Not on file   Occupational History     Occupation: Special Ed   Social Needs      Financial resource strain: Not on file     Food insecurity     Worry: Not on file     Inability: Not on file     Transportation needs     Medical: Not on file     Non-medical: Not on file   Tobacco Use     Smoking status: Never Smoker     Smokeless tobacco: Never Used   Substance and Sexual Activity     Alcohol use: No     Drug use: No     Sexual activity: Not Currently     Partners: Male     Birth control/protection: Pill   Lifestyle     Physical activity     Days per week: Not on file     Minutes per session: Not on file     Stress: Not on file   Relationships     Social connections     Talks on phone: Not on file     Gets together: Not on file     Attends Zoroastrianism service: Not on file     Active member of club or organization: Not on file     Attends meetings of clubs or organizations: Not on file     Relationship status: Not on file     Intimate partner violence     Fear of current or ex partner: Not on file     Emotionally abused: Not on file     Physically abused: Not on file     Forced sexual activity: Not on file   Other Topics Concern     Parent/sibling w/ CABG, MI or angioplasty before 65F 55M? No   Social History Narrative    ** Merged History Encounter **         8/2018  Lives in [place] with , Demetrio and sons, Tabby and Kenton.  No smokers in the home.  No indoor cats/kittens.  No concerns about domestic violence.  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         Family History -   Family History   Problem Relation Age of Onset     Arthritis Mother      No Known Problems Father      No Known Problems Maternal Grandmother      Diabetes Maternal Grandfather         Type II     Hyperlipidemia Paternal Grandmother      No Known Problems Paternal Grandfather      Depression Brother         6 yrs younger     Anxiety Disorder Brother         on meds     No Known Problems Son      No Known Problems Son        Review of Systems - As per HPI and PMHx, otherwise review of system review of the head  and neck negative. Otherwise 10+ review of system is negative    Physical Exam  There were no vitals taken for this visit.  BMI: There is no height or weight on file to calculate BMI.    General - The patient is well nourished and well developed, and appears to have good nutritional status.  Alert and oriented to person and place, answers questions and cooperates with examination appropriately.    SKIN - No suspicious lesions or rashes.  Respiration - No respiratory distress.  Head and Face - Normocephalic and atraumatic, with no gross asymmetry noted of the contour of the facial features.  The facial nerve is intact, with strong symmetric movements.    Voice and Breathing - The patient was breathing comfortably without the use of accessory muscles. The patients voice was clear and strong, and had appropriate pitch and quality.    Ears - Bilateral pinna and EACs with normal appearing overlying skin.  Right tympanic membrane intact with good mobility on pneumatic otoscopy . Bony landmarks of the ossicular chain are normal. The tympanic membranes are normal in appearance. No retraction, perforation, or masses.  No fluid or purulence was seen in the external canal or the middle ear.  On the left significant amount of serous fluid seen behind the tympanic membrane.  Your canal is clear and dry.    Eyes - Extraocular movements intact.  Sclera were not icteric or injected, conjunctiva were pink and moist.    Mouth - Examination of the oral cavity showed pink, healthy oral mucosa. No lesions or ulcerations noted.  The tongue was mobile and midline, and the dentition were in good condition.      Throat - The walls of the oropharynx were smooth, pink, moist, symmetric, and had no lesions or ulcerations.  The tonsillar pillars and soft palate were symmetric.  The uvula was midline on elevation.    Neck - Normal midline excursion of the laryngotracheal complex during swallowing.  Full range of motion on passive movement.   Palpation of the occipital, submental, submandibular, internal jugular chain, and supraclavicular nodes did not demonstrate any abnormal lymph nodes or masses.  The carotid pulse was palpable bilaterally.  Palpation of the thyroid was soft and smooth, with no nodules or goiter appreciated.  The trachea was mobile and midline.    Nose - External contour is symmetric, no gross deflection or scars.  Nasal mucosa is erythematous and moist with some abnormal mucus.  The septum was deviated to the left and somewhat she also has deviated septum to the left causing more of a nasal obstructive, turbinates of normal size and position.  No polyps, masses, or purulence noted on examination.    Neuro - Nonfocal neuro exam is normal, CN 2 through 12 intact, normal gait and muscle tone.      Performed in clinic today:  Audiologic Studies - An audiogram and tympanogram were performed today as part of the evaluation and personally reviewed. The tympanogram shows Type A curves on the right and Type B curves on the left, with Normal canal volumes and middle ear pressures.  The audiogram showed Normal pure-tone's on the right and Moderate conductive losson the left.        A/P - Leah Casarez is a 34 year old female with acute serous otitis media.  Congestion in the left especially with his upper respiratory infection and rhinitis.  At this point therefore discussed continuing antibiotics using Afrin nose spray twice daily for 3 days to decongest better eustachian tube exercises to be used as well.  Patient can use ibuprofen to reduce inflammation and discomfort at the same time.  She will see me back in 3 weeks.      Jose Vivas MD

## 2020-03-16 ENCOUNTER — OFFICE VISIT (OUTPATIENT)
Dept: AUDIOLOGY | Facility: CLINIC | Age: 35
End: 2020-03-16
Payer: COMMERCIAL

## 2020-03-16 ENCOUNTER — OFFICE VISIT (OUTPATIENT)
Dept: OTOLARYNGOLOGY | Facility: CLINIC | Age: 35
End: 2020-03-16
Payer: COMMERCIAL

## 2020-03-16 VITALS
DIASTOLIC BLOOD PRESSURE: 68 MMHG | BODY MASS INDEX: 35.29 KG/M2 | SYSTOLIC BLOOD PRESSURE: 106 MMHG | WEIGHT: 238.3 LBS | HEIGHT: 69 IN

## 2020-03-16 DIAGNOSIS — J34.2 DEVIATED NASAL SEPTUM: ICD-10-CM

## 2020-03-16 DIAGNOSIS — H65.02 ACUTE SEROUS OTITIS MEDIA OF LEFT EAR, RECURRENCE NOT SPECIFIED: Primary | ICD-10-CM

## 2020-03-16 DIAGNOSIS — H90.12 CONDUCTIVE HEARING LOSS OF LEFT EAR WITH UNRESTRICTED HEARING OF RIGHT EAR: Primary | ICD-10-CM

## 2020-03-16 PROCEDURE — 99203 OFFICE O/P NEW LOW 30 MIN: CPT | Performed by: OTOLARYNGOLOGY

## 2020-03-16 PROCEDURE — 92550 TYMPANOMETRY & REFLEX THRESH: CPT | Performed by: AUDIOLOGIST

## 2020-03-16 PROCEDURE — 99207 ZZC NO CHARGE LOS: CPT | Performed by: AUDIOLOGIST

## 2020-03-16 PROCEDURE — 92557 COMPREHENSIVE HEARING TEST: CPT | Performed by: AUDIOLOGIST

## 2020-03-16 ASSESSMENT — MIFFLIN-ST. JEOR: SCORE: 1855.3

## 2020-03-16 ASSESSMENT — PAIN SCALES - GENERAL: PAINLEVEL: MILD PAIN (3)

## 2020-03-16 NOTE — PROGRESS NOTES
AUDIOLOGY REPORT     SUMMARY: Audiology visit completed. See audiogram for results.     RECOMMENDATIONS: Follow-up with ENT    Jd Mcfadden Licensed Audiologist #[id number]

## 2020-03-16 NOTE — LETTER
3/16/2020         RE: Leah Casarez  [address] [facility] [id number]        Dear Colleague,    Thank you for referring your patient, Leah Casarez, to the [facility]. Please see a copy of my visit note below.    ENT Consultation    Leah Casarez who is a 34 year old female seen in consultation at the request of Dr. Campos pressure in the ear with loss of hearing..      History of Present Illness - Leah Casarez is a 34 year old female presents for evaluation of basically started last week and end of the week she felt pressure pain decreased hearing in the left ear.  She used to have a lot of ear infections but last was over 2 years ago.  Also gets more nasal congestion lately.  Special left side is more congested.  Started more with upper respite infection congestion.  She was started on Augmentin about 4 days ago.  She also started Flonase which does not help with congestion at all.  Feels some clear postnasal drainage.  No sinus pressure.  No fever no chills.      Past Medical History -   Past Medical History:   Diagnosis Date     Dislocation of symphysis pubis, initial encounter 11/1/2017     NO ACTIVE PROBLEMS (aka NONE)        Current Medications -   Current Outpatient Medications:      amoxicillin-clavulanate (AUGMENTIN) 875-125 MG tablet, Take 1 tablet by mouth 2 times daily for 7 days, Disp: 14 tablet, Rfl: 0     ciprofloxacin-dexamethasone (CIPRODEX) 0.3-0.1 % otic suspension, Place 4 drops Into the left ear 2 times daily for 7 days, Disp: 2.8 mL, Rfl: 0     fluticasone (FLONASE) 50 MCG/ACT nasal spray, Spray 2 sprays into both nostrils daily, Disp: 16 g, Rfl: 3     ibuprofen (ADVIL/MOTRIN) 200 MG capsule, Take 200 mg by mouth every 4 hours as needed for fever, Disp: , Rfl:      sertraline (ZOLOFT) 50 MG tablet, Take 1 tablet (50 mg) by mouth daily, Disp: 90 tablet, Rfl: 3    Allergies - No Known Allergies    Social History -   Social History      Socioeconomic History     Marital status:      Spouse name: Not on file     Number of children: Not on file     Years of education: Not on file     Highest education level: Not on file   Occupational History     Occupation: Special Ed   Social Needs     Financial resource strain: Not on file     Food insecurity     Worry: Not on file     Inability: Not on file     Transportation needs     Medical: Not on file     Non-medical: Not on file   Tobacco Use     Smoking status: Never Smoker     Smokeless tobacco: Never Used   Substance and Sexual Activity     Alcohol use: No     Drug use: No     Sexual activity: Not Currently     Partners: Male     Birth control/protection: Pill   Lifestyle     Physical activity     Days per week: Not on file     Minutes per session: Not on file     Stress: Not on file   Relationships     Social connections     Talks on phone: Not on file     Gets together: Not on file     Attends Church service: Not on file     Active member of club or organization: Not on file     Attends meetings of clubs or organizations: Not on file     Relationship status: Not on file     Intimate partner violence     Fear of current or ex partner: Not on file     Emotionally abused: Not on file     Physically abused: Not on file     Forced sexual activity: Not on file   Other Topics Concern     Parent/sibling w/ CABG, MI or angioplasty before 65F 55M? No   Social History Narrative    ** Merged History Encounter **         8/2018  Lives in [place] with , Demetrio and sons, Tabby and Kenton.  No smokers in the home.  No indoor cats/kittens.  No concerns about domestic violence.  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         Family History -   Family History   Problem Relation Age of Onset     Arthritis Mother      No Known Problems Father      No Known Problems Maternal Grandmother      Diabetes Maternal Grandfather         Type II     Hyperlipidemia Paternal Grandmother      No Known  Problems Paternal Grandfather      Depression Brother         6 yrs younger     Anxiety Disorder Brother         on meds     No Known Problems Son      No Known Problems Son        Review of Systems - As per HPI and PMHx, otherwise review of system review of the head and neck negative. Otherwise 10+ review of system is negative    Physical Exam  There were no vitals taken for this visit.  BMI: There is no height or weight on file to calculate BMI.    General - The patient is well nourished and well developed, and appears to have good nutritional status.  Alert and oriented to person and place, answers questions and cooperates with examination appropriately.    SKIN - No suspicious lesions or rashes.  Respiration - No respiratory distress.  Head and Face - Normocephalic and atraumatic, with no gross asymmetry noted of the contour of the facial features.  The facial nerve is intact, with strong symmetric movements.    Voice and Breathing - The patient was breathing comfortably without the use of accessory muscles. The patients voice was clear and strong, and had appropriate pitch and quality.    Ears - Bilateral pinna and EACs with normal appearing overlying skin.  Right tympanic membrane intact with good mobility on pneumatic otoscopy . Bony landmarks of the ossicular chain are normal. The tympanic membranes are normal in appearance. No retraction, perforation, or masses.  No fluid or purulence was seen in the external canal or the middle ear.  On the left significant amount of serous fluid seen behind the tympanic membrane.  Your canal is clear and dry.    Eyes - Extraocular movements intact.  Sclera were not icteric or injected, conjunctiva were pink and moist.    Mouth - Examination of the oral cavity showed pink, healthy oral mucosa. No lesions or ulcerations noted.  The tongue was mobile and midline, and the dentition were in good condition.      Throat - The walls of the oropharynx were smooth, pink, moist,  symmetric, and had no lesions or ulcerations.  The tonsillar pillars and soft palate were symmetric.  The uvula was midline on elevation.    Neck - Normal midline excursion of the laryngotracheal complex during swallowing.  Full range of motion on passive movement.  Palpation of the occipital, submental, submandibular, internal jugular chain, and supraclavicular nodes did not demonstrate any abnormal lymph nodes or masses.  The carotid pulse was palpable bilaterally.  Palpation of the thyroid was soft and smooth, with no nodules or goiter appreciated.  The trachea was mobile and midline.    Nose - External contour is symmetric, no gross deflection or scars.  Nasal mucosa is erythematous and moist with some abnormal mucus.  The septum was deviated to the left and somewhat she also has deviated septum to the left causing more of a nasal obstructive, turbinates of normal size and position.  No polyps, masses, or purulence noted on examination.    Neuro - Nonfocal neuro exam is normal, CN 2 through 12 intact, normal gait and muscle tone.      Performed in clinic today:  Audiologic Studies - An audiogram and tympanogram were performed today as part of the evaluation and personally reviewed. The tympanogram shows Type A curves on the right and Type B curves on the left, with Normal canal volumes and middle ear pressures.  The audiogram showed Normal pure-tone's on the right and Moderate conductive losson the left.        A/P - Leah Casarez is a 34 year old female with acute serous otitis media.  Congestion in the left especially with his upper respiratory infection and rhinitis.  At this point therefore discussed continuing antibiotics using Afrin nose spray twice daily for 3 days to decongest better eustachian tube exercises to be used as well.  Patient can use ibuprofen to reduce inflammation and discomfort at the same time.  She will see me back in 3 weeks.      Jose Vivas MD      Again, thank you for  allowing me to participate in the care of your patient.        Sincerely,        Jose Vivas MD, MD

## 2020-08-10 ASSESSMENT — ENCOUNTER SYMPTOMS
NAUSEA: 0
FREQUENCY: 0
HEMATOCHEZIA: 0
PALPITATIONS: 0
HEARTBURN: 0
MYALGIAS: 0
HEMATURIA: 0
CONSTIPATION: 0
WEAKNESS: 0
SHORTNESS OF BREATH: 0
HEADACHES: 0
JOINT SWELLING: 0
DIZZINESS: 0
NERVOUS/ANXIOUS: 0
EYE PAIN: 0
PARESTHESIAS: 0
FEVER: 0
ARTHRALGIAS: 0
COUGH: 0
BREAST MASS: 0
SORE THROAT: 0
DIARRHEA: 0
ABDOMINAL PAIN: 0
CHILLS: 0
DYSURIA: 0

## 2020-08-11 ENCOUNTER — OFFICE VISIT (OUTPATIENT)
Dept: FAMILY MEDICINE | Facility: CLINIC | Age: 35
End: 2020-08-11
Payer: COMMERCIAL

## 2020-08-11 VITALS
TEMPERATURE: 97.7 F | DIASTOLIC BLOOD PRESSURE: 74 MMHG | OXYGEN SATURATION: 98 % | BODY MASS INDEX: 36.18 KG/M2 | SYSTOLIC BLOOD PRESSURE: 120 MMHG | HEART RATE: 84 BPM | RESPIRATION RATE: 16 BRPM | WEIGHT: 245 LBS

## 2020-08-11 DIAGNOSIS — Z13.1 SCREENING FOR DIABETES MELLITUS: ICD-10-CM

## 2020-08-11 DIAGNOSIS — Z13.220 LIPID SCREENING: ICD-10-CM

## 2020-08-11 DIAGNOSIS — Z00.00 ROUTINE GENERAL MEDICAL EXAMINATION AT A HEALTH CARE FACILITY: Primary | ICD-10-CM

## 2020-08-11 PROCEDURE — 99395 PREV VISIT EST AGE 18-39: CPT | Performed by: FAMILY MEDICINE

## 2020-08-11 ASSESSMENT — PATIENT HEALTH QUESTIONNAIRE - PHQ9: SUM OF ALL RESPONSES TO PHQ QUESTIONS 1-9: 0

## 2020-08-11 ASSESSMENT — PAIN SCALES - GENERAL: PAINLEVEL: NO PAIN (0)

## 2020-08-11 NOTE — PROGRESS NOTES
SUBJECTIVE:   CC: Leah Casarez is an 35 year old woman who presents for preventive health visit.     Healthy Habits:     Getting at least 3 servings of Calcium per day:  Yes    Bi-annual eye exam:  NO    Dental care twice a year:  Yes    Sleep apnea or symptoms of sleep apnea:  None    Diet:  Gluten-free/reduced    Frequency of exercise:  1 day/week    Duration of exercise:  30-45 minutes    Taking medications regularly:  Yes    Medication side effects:  None    PHQ-2 Total Score: 0    Additional concerns today:  No              Today's PHQ-2 Score:   PHQ-2 ( 1999 Pfizer) 8/10/2020   Q1: Little interest or pleasure in doing things 0   Q2: Feeling down, depressed or hopeless 0   PHQ-2 Score 0   Q1: Little interest or pleasure in doing things Not at all   Q2: Feeling down, depressed or hopeless Not at all   PHQ-2 Score 0       Abuse: Current or Past(Physical, Sexual or Emotional)- No  Do you feel safe in your environment? Yes        Social History     Tobacco Use     Smoking status: Never Smoker     Smokeless tobacco: Never Used   Substance Use Topics     Alcohol use: No     If you drink alcohol do you typically have >3 drinks per day or >7 drinks per week? No    Alcohol Use 8/10/2020   Prescreen: >3 drinks/day or >7 drinks/week? No   Prescreen: >3 drinks/day or >7 drinks/week? -       Reviewed orders with patient.  Reviewed health maintenance and updated orders accordingly - Yes  Labs reviewed in EPIC  BP Readings from Last 3 Encounters:   08/11/20 120/74   03/16/20 106/68   03/11/20 118/74    Wt Readings from Last 3 Encounters:   08/11/20 111.1 kg (245 lb)   03/16/20 108.1 kg (238 lb 4.8 oz)   03/11/20 108 kg (238 lb 3.2 oz)                  Patient Active Problem List   Diagnosis     Gluten intolerance     Obesity (BMI 35.0-39.9 without comorbidity)     Seasonal allergic rhinitis, unspecified chronicity, unspecified trigger     CARDIOVASCULAR SCREENING; LDL GOAL LESS THAN 160     Sprain of neck      Segmental dysfunction of cervical region     Segmental dysfunction of thoracic region     Tension headache     Segmental dysfunction of sacral region     Segmental dysfunction of lumbar region     Lumbago     HOLLY (generalized anxiety disorder)     Moderate major depression (H)     Injury of jaw, initial encounter (WC)     Past Surgical History:   Procedure Laterality Date     LAPAROSCOPIC SALPINGECTOMY Bilateral 8/12/2019    Procedure: SALPINGECTOMY, LAPAROSCOPIC BILATERAL;  Surgeon: Dwayne Campos MD;  Location: [facility]     NO HISTORY OF SURGERY         Social History     Tobacco Use     Smoking status: Never Smoker     Smokeless tobacco: Never Used   Substance Use Topics     Alcohol use: No     Family History   Problem Relation Age of Onset     Arthritis Mother      No Known Problems Father      No Known Problems Maternal Grandmother      Diabetes Maternal Grandfather         Type II     Hyperlipidemia Paternal Grandmother      No Known Problems Paternal Grandfather      Depression Brother         6 yrs younger     Anxiety Disorder Brother         on meds     No Known Problems Son      No Known Problems Son          Current Outpatient Medications   Medication Sig Dispense Refill     fluticasone (FLONASE) 50 MCG/ACT nasal spray Spray 2 sprays into both nostrils daily 16 g 3     ibuprofen (ADVIL/MOTRIN) 200 MG capsule Take 200 mg by mouth every 4 hours as needed for fever       sertraline (ZOLOFT) 50 MG tablet Take 1 tablet (50 mg) by mouth daily 90 tablet 3     No Known Allergies  Recent Labs   Lab Test 01/31/20  1329 09/17/19  0925 09/24/18  1809   ALT 39  --   --    CR 0.74  --   --    GFRESTIMATED >90  --   --    GFRESTBLACK >90  --   --    POTASSIUM 3.8  --   --    TSH  --  1.28 1.25        Mammogram not appropriate for this patient based on age.    Pertinent mammograms are reviewed under the imaging tab.  History of abnormal Pap smear: NO - age 30-65 PAP every 5 years with negative HPV co-testing  recommended  PAP / HPV Latest Ref Rng & Units 2017   PAP - NIL NIL   HPV 16 DNA NEG Negative -   HPV 18 DNA NEG Negative -   OTHER HR HPV NEG Negative -     Reviewed and updated as needed this visit by clinical staff  Tobacco         Reviewed and updated as needed this visit by Provider        Past Medical History:   Diagnosis Date     Dislocation of symphysis pubis, initial encounter 2017     NO ACTIVE PROBLEMS (aka NONE)       Past Surgical History:   Procedure Laterality Date     LAPAROSCOPIC SALPINGECTOMY Bilateral 2019    Procedure: SALPINGECTOMY, LAPAROSCOPIC BILATERAL;  Surgeon: Dwayne Campos MD;  Location: [facility]     NO HISTORY OF SURGERY       OB History    Para Term  AB Living   3 3 3 0 0 2   SAB TAB Ectopic Multiple Live Births   0 0 0 0 2      # Outcome Date GA Lbr Marlo/2nd Weight Sex Delivery Anes PTL Lv   3 Term 19 39w6d 03:15 / 00:11 3.11 kg (6 lb 13.7 oz) F Vag-Spont EPI N JESSIE      Name: PEYTON,FEMALE-ELIDA      Apgar1: 9  Apgar5: 9   2 Term 17 40w4d 06:00 / 00:44 3.147 kg (6 lb 15 oz) M Vag-Spont EPI N JESSIE      Name: Kenton      Apgar1: 9  Apgar5: 9   1 Term 05/08/15 40w0d  3.317 kg (7 lb 5 oz) M   N       Name: Tabby      Obstetric Comments   JACKELIN:5/7/15 by 8 4/7 week ultrasound    to Demetrio.  This will be their first delivery at [facility].       Review of Systems  CONSTITUTIONAL: NEGATIVE for fever, chills, change in weight  INTEGUMENTARU/SKIN: NEGATIVE for worrisome rashes, moles or lesions  EYES: NEGATIVE for vision changes or irritation  ENT: NEGATIVE for ear, mouth and throat problems  RESP: NEGATIVE for significant cough or SOB  BREAST: NEGATIVE for masses, tenderness or discharge  CV: NEGATIVE for chest pain, palpitations or peripheral edema  GI: NEGATIVE for nausea, abdominal pain, heartburn, or change in bowel habits  : NEGATIVE for unusual urinary or vaginal symptoms. Periods are regular.  MUSCULOSKELETAL: NEGATIVE for  significant arthralgias or myalgia  NEURO: NEGATIVE for weakness, dizziness or paresthesias  PSYCHIATRIC: NEGATIVE for changes in mood or affect     OBJECTIVE:   /74   Pulse 84   Temp 97.7  F (36.5  C) (Temporal)   Resp 16   Wt 111.1 kg (245 lb)   LMP 07/30/2020   SpO2 98%   Breastfeeding No   BMI 36.18 kg/m    Physical Exam  GENERAL: healthy, alert and no distress  EYES: Eyes grossly normal to inspection, PERRL and conjunctivae and sclerae normal  HENT: ear canals and TM's normal, nose and mouth without ulcers or lesions  NECK: no adenopathy, no asymmetry, masses, or scars and thyroid normal to palpation  RESP: lungs clear to auscultation - no rales, rhonchi or wheezes  BREAST: No breast exam done, we discussed self breast awareness and what to be concerned about, ie; retraction of a nipple, dimpling of the skin or any nipple discharge or aerolar rash that does not clear.   CV: regular rate and rhythm, normal S1 S2, no S3 or S4, no murmur, click or rub, no peripheral edema and peripheral pulses strong  ABDOMEN: soft, nontender, no hepatosplenomegaly, no masses and bowel sounds normal   (female): Exam deferred, patient not due for a pap smear and she is without complaints or concerns today.   MS: no gross musculoskeletal defects noted, no edema  SKIN: no suspicious lesions or rashes  NEURO: Normal strength and tone, mentation intact and speech normal  PSYCH: mentation appears normal, affect normal/bright    Diagnostic Test Results:  Labs reviewed in Epic  Orders placed for future fasting labs    ASSESSMENT/PLAN:   (Z00.00) Routine general medical examination at a health care facility  (primary encounter diagnosis)  Comment: doing well overall but having a hard time losing weight.   Plan: Discussed what it takes to lose one pound of fat per week, a decrease in net caloric intake by approximately 700 calories per day.  This could be done by decreasing calories by 300-400 per day and increasing the  "expendature of calories by the same.  If she/he loses weight one pound at a time this is very \"doable\" and less daunting than trying to lose #.  She/he is agreeable to trying something like this. We talked about a few simple things he/she can do to help facilitate weight loss.  One is to eat only when he/she has hunger pains and the other is to eat more slowly and to stop eating well before he/she is full, portion control.  If he/she eats until he/she is full, he/she is actually overeating.  By eating on a smaller plate (bread and butter) instead of a large dinner plate, he/she would be less likely to overeat also, another form of portion control.   Patient's Body mass index is Body mass index is 36.18 kg/m .  We discussed that an ideal BMI should be 20-25.    (Z13.1) Screening for diabetes mellitus  Comment: due for a glucose  Plan: **Glucose FUTURE anytime        Future order placed.     (Z13.220) Lipid screening  Comment: due for lipids  Plan: Lipid panel reflex to direct LDL Fasting        Future order placed.       COUNSELING:  Reviewed preventive health counseling, as reflected in patient instructions       Regular exercise       Healthy diet/nutrition       Vision screening    Estimated body mass index is 36.18 kg/m  as calculated from the following:    Height as of 3/16/20: 1.753 m (5' 9\").    Weight as of this encounter: 111.1 kg (245 lb).    Weight management plan: see above     reports that she has never smoked. She has never used smokeless tobacco.      Counseling Resources:  ATP IV Guidelines  Pooled Cohorts Equation Calculator  Breast Cancer Risk Calculator  FRAX Risk Assessment  ICSI Preventive Guidelines  Dietary Guidelines for Americans, 2010  USDA's MyPlate  ASA Prophylaxis  Lung CA Screening    Electronically signed by:  Dwayne Campos M.D.  8/11/2020    "

## 2020-09-19 DIAGNOSIS — Z13.1 SCREENING FOR DIABETES MELLITUS: ICD-10-CM

## 2020-09-19 DIAGNOSIS — Z13.220 LIPID SCREENING: ICD-10-CM

## 2020-09-19 LAB
CHOLEST SERPL-MCNC: 156 MG/DL
GLUCOSE SERPL-MCNC: 99 MG/DL (ref 70–99)
HDLC SERPL-MCNC: 56 MG/DL
LDLC SERPL CALC-MCNC: 81 MG/DL
NONHDLC SERPL-MCNC: 100 MG/DL
TRIGL SERPL-MCNC: 94 MG/DL

## 2020-09-19 PROCEDURE — 80061 LIPID PANEL: CPT | Performed by: FAMILY MEDICINE

## 2020-09-19 PROCEDURE — 82947 ASSAY GLUCOSE BLOOD QUANT: CPT | Performed by: FAMILY MEDICINE

## 2020-09-19 PROCEDURE — 36415 COLL VENOUS BLD VENIPUNCTURE: CPT | Performed by: FAMILY MEDICINE

## 2020-11-23 ENCOUNTER — VIRTUAL VISIT (OUTPATIENT)
Dept: FAMILY MEDICINE | Facility: CLINIC | Age: 35
End: 2020-11-23
Payer: COMMERCIAL

## 2020-11-23 ENCOUNTER — NURSE TRIAGE (OUTPATIENT)
Dept: NURSING | Facility: CLINIC | Age: 35
End: 2020-11-23

## 2020-11-23 DIAGNOSIS — N60.41 PERIDUCTAL MASTITIS OF RIGHT BREAST: Primary | ICD-10-CM

## 2020-11-23 PROCEDURE — 99213 OFFICE O/P EST LOW 20 MIN: CPT | Mod: TEL | Performed by: NURSE PRACTITIONER

## 2020-11-23 NOTE — TELEPHONE ENCOUNTER
Triage Call:    Pt tested positive for COVID at outside facility.   Currently having body aches, sore throat, hot/cold flashes, headache and fever.  Symptoms began last Monday night. Pt states symptoms go up and down.   Temperature around 100.4-101. Consistent since day 3 of symptoms.   Has not seen a provider for COVID symptoms.    Pt states she feels like she is going to fall over when she walks.  Does not feel like she needs assistance to walk.    Sometimes feels off balance.     Pt states since she stopped breastfeeding over 1 year ago she has had a little white spot in her right nipple.   Pt states nipple has been misshaped since. Pt states it didn't bother her.   Pt noticed pus coming out of nipple coming out about 1 week ago, redness, very sore. Warm to the touch, spreading redness.   Pain is mild. Worse when touched.     Pt was advised of protocol recommendation/disposition of telemedicine visit today/office visit today.  RN recommended virtual/video appointment for patient. Patient was agreeable. Pt to call back with any new or worsening sx. .     Rita Gerardo RN 11/23/20 8:06 AM  [facility] Nurse Advisor    COVID 19 Nurse Triage Plan/Patient Instructions    Please be aware that novel coronavirus (COVID-19) may be circulating in the community. If you develop symptoms such as fever, cough, or SOB or if you have concerns about the presence of another infection including coronavirus (COVID-19), please contact your health care provider or visit [email or web address].     Disposition/Instructions    Virtual Visit with provider recommended. Reference Visit Selection Guide.    Thank you for taking steps to prevent the spread of this virus.  o Limit your contact with others.  o Wear a simple mask to cover your cough.  o Wash your hands well and often.    Resources    [facility]: About COVID-19: [email or web address]    CDC: What to Do If You're Sick:  www.cdc.gov/coronavirus/2019-ncov/about/steps-when-sick.html    CDC: Ending Home Isolation: www.cdc.gov/coronavirus/2019-ncov/hcp/disposition-in-home-patients.html     CDC: Caring for Someone: www.cdc.gov/coronavirus/2019-ncov/if-you-are-sick/care-for-someone.html     [facility]: Interim Guidance for Hospital Discharge to Home: www.health.[facility].mn./diseases/coronavirus/hcp/hospdischarge.pdf    [facility] clinical trials (COVID-19 research studies): clinicalaffairs.[facility]-clinical-trials     Below are the COVID-19 hotlines at the [place] Department of Health ([facility]). Interpreters are available.   o For health questions: Call 484-353-4133 or 1-301.136.9742 (7 a.m. to 7 p.m.)  o For questions about schools and childcare: Call 067-894-9036 or 1-157.509.4019 (7 a.m. to 7 p.m.)                   Reason for Disposition    MILD difficulty breathing (e.g., minimal/no SOB at rest, SOB with walking, pulse <100)    Additional Information    Negative: Chest pain    Negative: Breastfeeding questions about baby    Negative: Breastfeeding questions about mother (breast symptoms or feeling sick)    Negative: Breastfeeding questions about mother's medicines and diet    Negative: Postpartum breast pain and swelling, not breastfeeding    Negative: Small spot, skin growth or mole    Negative: SEVERE breast pain and fever > 103 F (39.4 C)    Negative: Patient sounds very sick or weak to the triager    Breast looks infected (spreading redness, feels hot or painful to touch) and fever    Negative: Patient sounds very sick or weak to the triager    Negative: MODERATE difficulty breathing (e.g., speaks in phrases, SOB even at rest, pulse 100-120)    Negative: [1] COVID-19 exposure AND [2] no symptoms    Negative: COVID-19 and Breastfeeding, questions about    Negative: [1] Adult with possible COVID-19 symptoms AND [2] triager concerned about severity of symptoms or other causes    Negative: Bluish (or gray) lips or face now     Negative: Difficult to awaken or acting confused (e.g., disoriented, slurred speech)    Negative: SEVERE difficulty breathing (e.g., struggling for each breath, speaks in single words)    Negative: Shock suspected (e.g., cold/pale/clammy skin, too weak to stand, low BP, rapid pulse)    Negative: Sounds like a life-threatening emergency to the triager    Negative: SEVERE or constant chest pain or pressure (Exception: mild central chest pain, present only when coughing)    Protocols used: CORONAVIRUS (COVID-19) DIAGNOSED OR [id number] 8.4.20, BREAST SYMPTOMS-A-[place]

## 2020-11-23 NOTE — PATIENT INSTRUCTIONS
Patient Education     What Are Benign Breast Conditions?  Most breast conditions are not cancer (benign), causing no serious harm to you. But all women are at some risk for breast cancer. Your risk increases as you grow older.  Common benign breast changes are covered here. Having one of these conditions does not put you at a higher risk for breast cancer. But all breast changes should be checked by a healthcare provider to know what the change is and whether it should be treated.   Breast infection  Infections of the breast tissue can cause skin redness, warmth, and pain or tenderness. The most common infection is mastitis. This inflammation of the mammary glands can happen during breastfeeding. Mastitis and other breast infections are often treated with antibiotics.  Nipple discharge  Many women can squeeze a tiny amount of a clear or milky discharge from one or both nipples. This discharge may be normal. Other kinds of discharge may be symptoms of a breast condition. A dark or bloody discharge can be caused by a benign growth in a duct near the nipple (an intraductal papilloma). It should be checked by your healthcare provider.    Fibrocystic changes  These benign changes may cause a thickening in the breasts. Breasts may be tender or painful. They may feel more dense in some areas than in others.  These benign changes may cause a thickening and firmness in the breasts. Breasts may be tender or painful. They may feel more dense in some areas than in others. Sometimes solid or fluid-filled lumps (cysts) may also form. Cysts may be smooth, soft or firm, and tender. They may become larger and more tender right before your period. An ultrasound may be done to make sure that a lump is a fluid-filled cyst and not cancer.    Benign breast lumps  Benign breast lumps come in all shapes, textures, and sizes. A lump of fibrous tissue (fibroadenoma) may be smooth, firm, and rubbery. This type of lump is usually painless  and movable. Have any lump checked by your healthcare provider.  [employer] last reviewed this educational content on 9/1/2017 2000-2020 [employer]. [address] [place] [address], [place] [address]. All rights reserved. This information is not intended as a substitute for professional medical care. Always follow your healthcare professional's instructions.    From our phone conversation you are suffering from periductal mastitis. An infection of the breast that involved purulent discharge  From the nipple and painful tender breast.    Hot back the breast 2-3 times daily.    Take the Augmentin: 1 tablet, 2 times daily by mouth with food, for the next 10 days.      See me in 2 weeks for follow up.    If symptoms get worse or you start to run a fever over 100.4 call clinic or present to the ER.     Eladio Kamara CNP

## 2020-11-23 NOTE — PROGRESS NOTES
"Leah Casarez is a 35 year old female who is being evaluated via a billable telephone visit.      The patient has been notified of following:     \"This telephone visit will be conducted via a call between you and your physician/provider. We have found that certain health care needs can be provided without the need for a physical exam.  This service lets us provide the care you need with a short phone conversation.  If a prescription is necessary we can send it directly to your pharmacy.  If lab work is needed we can place an order for that and you can then stop by our lab to have the test done at a later time.    Telephone visits are billed at different rates depending on your insurance coverage. During this emergency period, for some insurers they may be billed the same as an in-person visit.  Please reach out to your insurance provider with any questions.    If during the course of the call the physician/provider feels a telephone visit is not appropriate, you will not be charged for this service.\"    Patient has given verbal consent for Telephone visit?  Yes    What phone number would you like to be contacted at? [phone]    How would you like to obtain your AVS? Conrad Adkins     Leah Casarez is a 35 year old female who presents via phone visit today for the following health issues:    HPI     Breast Concern  Onset/Duration: Last Monday tested positive for COVID 19 had discharge about 2 weeks ago from nipple and has been sore to the touch and swollen last 3-4 days, worse today. Pea size white bump sticking out of the nipple. Now the whole breast is sore and swollen and going to the armpit. Patient stopped breast feeding 1 year ago.   Description:   Location: Right breast and nipple  Pain or tenderness: YES  Redness: YES  Intensity: severe  Progression of Symptoms: worsening  Accompanying Signs & Symptoms:  Any lumps in axillary region: no  Movable: no  Nipple discharge: " whitish  Changes in the skin or nipple: White pea sized   On Hormone therapy: no  Does it change with menstrual cycle: no  Previous history of similar problem: No   First degree relative with breast cancer: a negative family history for breast cancer.  Precipitating factors:           Worsened by: Touching or when putting on a bra  Alleviating factors:            Improved by: None   Therapies tried and outcome: Ibuprofen and tylenol   No LMP recorded.            Review of Systems   Constitutional, HEENT, cardiovascular, pulmonary, gi and gu systems are negative, except as otherwise noted.       Objective          Vitals:  No vitals were obtained today due to virtual visit.    alert and no distress  PSYCH: Alert and oriented times 3; coherent speech, normal   rate and volume, able to articulate logical thoughts, able   to abstract reason, no tangential thoughts, no hallucinations   or delusions  Her affect is normal  RESP: No cough, no audible wheezing, able to talk in full sentences  Remainder of exam unable to be completed due to telephone visits            Assessment/Plan:    Assessment & Plan     Periductal mastitis of right breast  -  Discussed  Home care.    - Will send home care  Instructions to her.   - I will plan to follow up with her in weeks once she is released from isolation from [facility] so I can assess the breast.   - amoxicillin-clavulanate (AUGMENTIN) 875-125 MG tablet; Take 1 tablet by mouth 2 times daily for 10 days    Return in about 2 weeks (around 12/7/2020), or Breast pain and swelling, nipple discharge..    Eladio Kamara NP  [facility]    Phone call duration:  11 minutes

## 2020-12-01 ENCOUNTER — IMMUNIZATION (OUTPATIENT)
Dept: FAMILY MEDICINE | Facility: OTHER | Age: 35
End: 2020-12-01
Payer: COMMERCIAL

## 2020-12-01 DIAGNOSIS — Z23 NEED FOR PROPHYLACTIC VACCINATION AND INOCULATION AGAINST INFLUENZA: Primary | ICD-10-CM

## 2020-12-01 PROCEDURE — 99207 PR NO CHARGE NURSE ONLY: CPT

## 2020-12-01 PROCEDURE — 90471 IMMUNIZATION ADMIN: CPT

## 2020-12-01 PROCEDURE — 90686 IIV4 VACC NO PRSV 0.5 ML IM: CPT

## 2020-12-07 ENCOUNTER — OFFICE VISIT (OUTPATIENT)
Dept: FAMILY MEDICINE | Facility: CLINIC | Age: 35
End: 2020-12-07
Payer: COMMERCIAL

## 2020-12-07 VITALS
RESPIRATION RATE: 18 BRPM | TEMPERATURE: 97.7 F | SYSTOLIC BLOOD PRESSURE: 124 MMHG | WEIGHT: 248.5 LBS | HEART RATE: 94 BPM | BODY MASS INDEX: 36.7 KG/M2 | DIASTOLIC BLOOD PRESSURE: 82 MMHG | OXYGEN SATURATION: 97 %

## 2020-12-07 DIAGNOSIS — N64.4 BREAST PAIN: Primary | ICD-10-CM

## 2020-12-07 PROCEDURE — 99213 OFFICE O/P EST LOW 20 MIN: CPT | Performed by: NURSE PRACTITIONER

## 2020-12-07 ASSESSMENT — PAIN SCALES - GENERAL: PAINLEVEL: NO PAIN (0)

## 2020-12-07 NOTE — PROGRESS NOTES
Subjective     Leah Casarez is a 35 year old female who presents to clinic today for the following health issues:    HPI         Patient was seen on 11/23/2020 for Mastitis was given antibiotic and told to follow up. Patient states she is feeling much better.     She had right nipple swelling and the tip of the nipple since stopping breast feeding 1 year ago. Since completing the antibiotic all swelling has resolved and she feels back to base line. No other new acute symptoms of concern.     Review of Systems   Constitutional, HEENT, cardiovascular, pulmonary, gi and gu systems are negative, except as otherwise noted.      Objective    /82   Pulse 94   Temp 97.7  F (36.5  C) (Temporal)   Resp 18   Wt 112.7 kg (248 lb 8 oz)   LMP 11/09/2020   SpO2 97%   Breastfeeding No   BMI 36.70 kg/m    Body mass index is 36.7 kg/m .  Physical Exam   GENERAL: healthy, alert and no distress  EYES: Eyes grossly normal to inspection, PERRL and conjunctivae and sclerae normal  NECK: no asymmetry, masses, or scars  RESP: Normal effort.   BREAST: normal without masses, tenderness or nipple discharge and no palpable axillary masses or adenopathy  ABDOMEN: soft, nontender  MS: no gross musculoskeletal defects noted, no edema          Assessment & Plan     Breast pain  - All breast pain and swelling has improved.   - Antibiotic has been completed and no new acute symptoms of concern.        Return in about 2 weeks (around 12/21/2020), or if symptoms worsen or fail to improve.    Eladio Kamara NP  [facility]

## 2020-12-07 NOTE — PATIENT INSTRUCTIONS
Breast tissue has returned to normal.     Call with any new symptoms or concerns.     Eladio Kamara CNP

## 2021-01-26 ENCOUNTER — MYC MEDICAL ADVICE (OUTPATIENT)
Dept: FAMILY MEDICINE | Facility: CLINIC | Age: 36
End: 2021-01-26

## 2021-01-30 ENCOUNTER — MYC MEDICAL ADVICE (OUTPATIENT)
Dept: FAMILY MEDICINE | Facility: CLINIC | Age: 36
End: 2021-01-30

## 2021-02-01 NOTE — TELEPHONE ENCOUNTER
RN TRIAGE CALL:    Patient Contact    Attempt # 1    Was call answered?  No.  Left message on voicemail with information to call clinic back if having more questions after this RN sending My Chart message back regarding concerns COVID 19 vaccination.    Patient called during My Chart message response.  Informed patient of My chart message contents and will send My Chart message along with this call.  Patient stated understanding.    Prabha John, RN

## 2021-02-09 ENCOUNTER — OFFICE VISIT (OUTPATIENT)
Dept: FAMILY MEDICINE | Facility: CLINIC | Age: 36
End: 2021-02-09
Payer: COMMERCIAL

## 2021-02-09 VITALS
RESPIRATION RATE: 16 BRPM | SYSTOLIC BLOOD PRESSURE: 118 MMHG | BODY MASS INDEX: 36.33 KG/M2 | WEIGHT: 246 LBS | DIASTOLIC BLOOD PRESSURE: 82 MMHG | TEMPERATURE: 96.8 F | OXYGEN SATURATION: 95 % | HEART RATE: 82 BPM

## 2021-02-09 DIAGNOSIS — F32.1 MODERATE MAJOR DEPRESSION (H): ICD-10-CM

## 2021-02-09 DIAGNOSIS — G44.201 ACUTE INTRACTABLE TENSION-TYPE HEADACHE: ICD-10-CM

## 2021-02-09 DIAGNOSIS — Z30.017 INSERTION OF IMPLANTABLE SUBDERMAL CONTRACEPTIVE: Primary | ICD-10-CM

## 2021-02-09 LAB — HCG UR QL: NEGATIVE

## 2021-02-09 PROCEDURE — 11981 INSERTION DRUG DLVR IMPLANT: CPT | Performed by: FAMILY MEDICINE

## 2021-02-09 PROCEDURE — 81025 URINE PREGNANCY TEST: CPT | Performed by: FAMILY MEDICINE

## 2021-02-09 RX ORDER — CYCLOBENZAPRINE HCL 5 MG
5-10 TABLET ORAL 3 TIMES DAILY PRN
Qty: 90 TABLET | Refills: 1 | Status: SHIPPED | OUTPATIENT
Start: 2021-02-09 | End: 2022-03-02

## 2021-02-09 ASSESSMENT — PAIN SCALES - GENERAL: PAINLEVEL: NO PAIN (0)

## 2021-02-09 ASSESSMENT — PATIENT HEALTH QUESTIONNAIRE - PHQ9: SUM OF ALL RESPONSES TO PHQ QUESTIONS 1-9: 1

## 2021-02-09 NOTE — PROGRESS NOTES
SUBJECTIVE:  Patient for a consultation regarding Nexplanon device for assistance in controlling her heavy menses.  She had a tubal removal at the time of her last  but now being off birth control she is having her initially heavy periods with increased cramping.  She does not tolerate combination OCPs so we are going to try a progesterone only method to see if we can control her menses for her.       We reviewed other methods of progesterone only options which included a Mirena IUD, Depo-Provera, and the nonhormonal ParaGard IUD which is the copper IUD.  She would like to try the Nexplanon now and try to avoid putting anything up into the uterus.  She is aware that she can have some irregular spotting or breakthrough bleeding for a number of months after placement is done.  Ideally in a perfect world she will have no menses within 3 to 4 months of placement.    Patient read through the information on Nexplanon and has no particular questions.  I gave her a copy of the patient handout from the company's website to take home and review further.    Patient still is very interested in using this form of birth control, and has no further questions for me.    Past Medical History:   Diagnosis Date     Dislocation of symphysis pubis, initial encounter 2017     NO ACTIVE PROBLEMS (aka NONE)        Past Surgical History:   Procedure Laterality Date     LAPAROSCOPIC SALPINGECTOMY Bilateral 2019    Procedure: SALPINGECTOMY, LAPAROSCOPIC BILATERAL;  Surgeon: Dwayne Campos MD;  Location:  OR     NO HISTORY OF SURGERY         Family History   Problem Relation Age of Onset     Arthritis Mother      No Known Problems Father      No Known Problems Maternal Grandmother      Diabetes Maternal Grandfather         Type II     Hyperlipidemia Paternal Grandmother      No Known Problems Paternal Grandfather      Depression Brother         6 yrs younger     Anxiety Disorder Brother         on meds     No Known  Problems Son      No Known Problems Son        Current Outpatient Medications   Medication     fluticasone (FLONASE) 50 MCG/ACT nasal spray     ibuprofen (ADVIL/MOTRIN) 200 MG capsule     sertraline (ZOLOFT) 50 MG tablet     No current facility-administered medications for this visit.         No Known Allergies    Social History     Socioeconomic History     Marital status:      Spouse name: Not on file     Number of children: Not on file     Years of education: Not on file     Highest education level: Not on file   Occupational History     Occupation: Special Ed   Social Needs     Financial resource strain: Not on file     Food insecurity     Worry: Not on file     Inability: Not on file     Transportation needs     Medical: Not on file     Non-medical: Not on file   Tobacco Use     Smoking status: Never Smoker     Smokeless tobacco: Never Used   Substance and Sexual Activity     Alcohol use: No     Drug use: No     Sexual activity: Not Currently     Partners: Male     Birth control/protection: Pill   Lifestyle     Physical activity     Days per week: Not on file     Minutes per session: Not on file     Stress: Not on file   Relationships     Social connections     Talks on phone: Not on file     Gets together: Not on file     Attends Mandaeism service: Not on file     Active member of club or organization: Not on file     Attends meetings of clubs or organizations: Not on file     Relationship status: Not on file     Intimate partner violence     Fear of current or ex partner: Not on file     Emotionally abused: Not on file     Physically abused: Not on file     Forced sexual activity: Not on file   Other Topics Concern     Parent/sibling w/ CABG, MI or angioplasty before 65F 55M? No   Social History Narrative    ** Merged History Encounter **         8/2018  Lives in [place] with , Demetrio and sons, Tabby and Kenton.  No smokers in the home.  No indoor cats/kittens.  No concerns about domestic  jacklyn Lynn is a special  in [place].            ASSESSMENT:  Nexplanon consultation for menorrhagia.       PLAN:  Nexplanon placement planned for today.  Discussed risks of bleeding and infection.  Also discussed the possibility of irregular bleeding for 3-6 months and then often cessation of menses.  Sometimes intermittent spotting will continue to occur, and is often the number one cited reason for early discontinuation.  Small risk of migration of the Nexplanon device or difficulty removing the implant can also occur.  We discussed importance of self palpation of the device to insure that it is still present.      This device is good for 3 years at which time she could have this one removed and another replaced.  All questions were answered and she plans to schedule appointment for placement of device.    I spent 10 minutes of face to face time with the patient, >50% of which was spent counseling and coordination of care regarding Nexplanon placement.      Electronically signed by:  Dwayne Campos M.D.  2/9/2021    SUBJECTIVE:  Patient is in for a Nexplanon placement today.  We discussed the use of it as noted above. Patient has had an injection.  She has no further questions for me today and signed the consent form for placement.  Her pregnancy test was negative.    PROCEDURE:  The patient was placed on exam table in supine position with her left arm flexed at the elbow.  A point ~8-10 cm from the epicondyle was marked and another point 4 cm proximal to this jessica was made on the inner arm.  This area was cleansed with chlorhexidine prep and then injected with 2mL of 1% lidocaine with epi along the track of insertion.  The Nexplanon device was opened and it was confirmed that the walter is in the device.  Under standard sterile fashion, a small stab incision was made with the device at the just distal to the jessica closer to the elbow.  The Nexplanon device was then inserted along the direction  of the two skin marks just under the skin with care not to go too deep.  Once the insertion needle was fully inserted beneath the skin, the retractor on the device was then slowly pulled back to allow the introducer needle to retract into the device, leaving the walter behind in the arm.  The walter was palpable in the appropriate place under the skin.  The incision was noted to be hemostatic and a pressure bandage was placed over insertion site with 4x4 gauze, Kerlix and tape.  She was instructed to ice as needed.    ASSESSMENT:  Nexplanon Insertion     PLAN:  Patient will not need to wait for intercourse since she had her tubes removed.  She was instructed to periodically palpate device to insure placement and was invited to follow-up with me if she has any issues or questions regarding the device.  She will need to follow-up in 3 years for removal, at which time she may have replacement device inserted for continued use of this form of contraception.    Follow up with me for all other health issues.    Electronically signed by:  Dwayne Campos M.D.  2/9/2021

## 2021-03-08 ASSESSMENT — ANXIETY QUESTIONNAIRES
5. BEING SO RESTLESS THAT IT IS HARD TO SIT STILL: NOT AT ALL
GAD7 TOTAL SCORE: 0
2. NOT BEING ABLE TO STOP OR CONTROL WORRYING: NOT AT ALL
6. BECOMING EASILY ANNOYED OR IRRITABLE: NOT AT ALL
3. WORRYING TOO MUCH ABOUT DIFFERENT THINGS: NOT AT ALL
7. FEELING AFRAID AS IF SOMETHING AWFUL MIGHT HAPPEN: NOT AT ALL
1. FEELING NERVOUS, ANXIOUS, OR ON EDGE: NOT AT ALL
GAD7 TOTAL SCORE: 0
7. FEELING AFRAID AS IF SOMETHING AWFUL MIGHT HAPPEN: NOT AT ALL
GAD7 TOTAL SCORE: 0
4. TROUBLE RELAXING: NOT AT ALL

## 2021-03-08 ASSESSMENT — PATIENT HEALTH QUESTIONNAIRE - PHQ9
SUM OF ALL RESPONSES TO PHQ QUESTIONS 1-9: 0
SUM OF ALL RESPONSES TO PHQ QUESTIONS 1-9: 0
10. IF YOU CHECKED OFF ANY PROBLEMS, HOW DIFFICULT HAVE THESE PROBLEMS MADE IT FOR YOU TO DO YOUR WORK, TAKE CARE OF THINGS AT HOME, OR GET ALONG WITH OTHER PEOPLE: NOT DIFFICULT AT ALL

## 2021-03-08 NOTE — PROGRESS NOTES
"  Assessment & Plan     ICD-10-CM    1. Left elbow pain  M25.522 predniSONE (DELTASONE) 20 MG tablet   2. Lateral epicondylitis of left elbow  M77.12 predniSONE (DELTASONE) 20 MG tablet     - Symptoms most consistent with lateral epicondylitis vs. Tendonitis   - Discussed conservative treatment      Patient has already done scheduled NSAIDs with no relief, will try oral steroid      Reviewed steroid use and side effects   - Recommend tennis elbow brace, ice, and stretch/strengthing      Can also consider massage   - If doesn't improve, will refer to ortho   - Hand outs given     Review of the result(s) of each unique test - None    Diagnosis or treatment significantly limited by social determinants of health - None     20 minutes spent on the date of the encounter doing chart review, history and exam, documentation and further activities as noted above    The patient indicates understanding of these issues and agrees with the plan.    Return in about 2 weeks (around 3/23/2021) for if not improving.    CATIA [facility] OSCAR Lynn is a 35 year old who presents for the following health issues     History of Present Illness       She eats 2-3 servings of fruits and vegetables daily.She consumes 0 sweetened beverage(s) daily.She exercises with enough effort to increase her heart rate 20 to 29 minutes per day.  She exercises with enough effort to increase her heart rate 3 or less days per week.   She is taking medications regularly.       Musculoskeletal problem/pain  Onset/Duration: 2 weeks   Description  Location: shoulder,hand, wrist and elbow - left  Joint Swelling: no  Redness: no  Pain: YES  Warmth: no  Intensity:  6/10  Progression of Symptoms:  worsening  Accompanying signs and symptoms:   Fevers: no  Numbness/tingling/weakness: YES  History  Trauma to the area: unsure, has had to do \"holds\" at work with students recently   Recent illness:  " "no  Previous similar problem: no  Previous evaluation:  no  Precipitating or alleviating factors:  Aggravating factors include: utilizations- but constant   Therapies tried and outcome: acetaminophen and Ibuprofen    - Special    - Felt very tight   - Constant pain, shooting, burning   - Worse with picking up things like her kids   - Hard to sleep at night   - tried muscle relaxer for her headaches, that didn't help either   - Ice helps minimally   - Mainly coming from elbow, just below it and then shoots to middle finger and up to shoulder       Review of Systems   Constitutional, HEENT, cardiovascular, pulmonary, gi and gu systems are negative, except as otherwise noted.      Objective    /70   Pulse 96   Temp 97.4  F (36.3  C) (Temporal)   Resp 18   Ht 1.735 m (5' 8.31\")   Wt 111.6 kg (246 lb)   SpO2 99%   BMI 37.07 kg/m    Body mass index is 37.07 kg/m .  Physical Exam   GENERAL APPEARANCE: healthy, alert and no distress  EYES: Eyes grossly normal to inspection, PERRLA, conjunctivae and sclerae without injection or discharge, EOM intact   MS:   Left shoulder: Normal ROM, non-tender, no edema, CMS intact, normal sensory exam, normal strength, negative Neer's, negative lift-off, negative belly press, negative hawking's   Left elbow: Normal ROM, tender to lateral epicondyle and into forearm with muscle spasm to extensor muscles, no edema, CMS intact, normal sensory exam, normal strength  Left wrist/hand: Normal ROM, non-tender, no edema, CMS intact, normal sensory exam, normal strength  Right elbow: Normal ROM, non-tender, no edema, CMS intact, normal sensory exam, normal strength  No musculoskeletal defects are noted and gait is age appropriate without ataxia   SKIN: No suspicious lesions or rashes, hydration status appears adeuqate with normal skin turgor   PSYCH: Alert and oriented x3; speech- coherent , normal rate and volume; able to articulate logical thoughts, able to abstract reason, " no tangential thoughts, no hallucinations or delusions, mentation appears normal, Mood is euthymic. Affect is appropriate for this mood state and bright. Thought content is free of suicidal ideation, hallucinations, and delusions. Dress is adequate and upkept. Eye contact is good during conversation.       Diagnostics  None

## 2021-03-09 ENCOUNTER — OFFICE VISIT (OUTPATIENT)
Dept: FAMILY MEDICINE | Facility: OTHER | Age: 36
End: 2021-03-09
Payer: COMMERCIAL

## 2021-03-09 VITALS
SYSTOLIC BLOOD PRESSURE: 122 MMHG | WEIGHT: 246 LBS | HEIGHT: 68 IN | DIASTOLIC BLOOD PRESSURE: 70 MMHG | BODY MASS INDEX: 37.28 KG/M2 | TEMPERATURE: 97.4 F | HEART RATE: 96 BPM | RESPIRATION RATE: 18 BRPM | OXYGEN SATURATION: 99 %

## 2021-03-09 DIAGNOSIS — M77.12 LATERAL EPICONDYLITIS OF LEFT ELBOW: ICD-10-CM

## 2021-03-09 DIAGNOSIS — M25.522 LEFT ELBOW PAIN: Primary | ICD-10-CM

## 2021-03-09 PROBLEM — Z13.6 CARDIOVASCULAR SCREENING; LDL GOAL LESS THAN 160: Status: RESOLVED | Noted: 2017-08-08 | Resolved: 2021-03-09

## 2021-03-09 PROCEDURE — 99213 OFFICE O/P EST LOW 20 MIN: CPT | Performed by: PHYSICIAN ASSISTANT

## 2021-03-09 RX ORDER — PREDNISONE 20 MG/1
40 TABLET ORAL EVERY MORNING
Qty: 10 TABLET | Refills: 0 | Status: SHIPPED | OUTPATIENT
Start: 2021-03-09 | End: 2021-03-14

## 2021-03-09 ASSESSMENT — PAIN SCALES - GENERAL: PAINLEVEL: SEVERE PAIN (6)

## 2021-03-09 ASSESSMENT — ANXIETY QUESTIONNAIRES: GAD7 TOTAL SCORE: 0

## 2021-03-09 ASSESSMENT — MIFFLIN-ST. JEOR: SCORE: 1874.22

## 2021-03-09 ASSESSMENT — PATIENT HEALTH QUESTIONNAIRE - PHQ9: SUM OF ALL RESPONSES TO PHQ QUESTIONS 1-9: 0

## 2021-03-09 NOTE — PATIENT INSTRUCTIONS
Patient Education     Understanding Lateral Epicondylitis     Tendons are strong bands of tissue that connect muscles to bones. Lateral epicondylitis affects the tendons that connect muscles in the forearm to the lateral epicondyle. This is the bony knob on the outer side of the elbow. The condition occurs if the extensor tendons of the wrist become painful and swollen (irritated). This can cause pain in the elbow, forearm, and wrist. Because the condition is sometimes caused by playing tennis, it's also known as  tennis elbow.     How to say it  LA-tuhr-juan [id number]-[id number]-LY-[id number]   What causes lateral epicondylitis?  The condition most often occurs because of overuse. This can be from any activity that repeatedly puts stress on the forearm extensor muscles or tendons and wrist. For instance, playing tennis, lifting weights, cutting meat, painting, and typing can all cause the condition. Wear and tear of the tendons from aging or an injury to the tendons can also cause the condition.   Symptoms of lateral epicondylitis  The most common symptom is pain. You may feel it on the outer side of the elbow and down the back of the forearm. It may be worse when moving or using the elbow, forearm, or wrist. You may also feel pain when gripping or lifting things.   Treatment for lateral epicondylitis  Treatments may include:    Resting the elbow, forearm, and wrist. You ll need to avoid movements that can make your symptoms worse. You also may need to avoid certain sports and types of work for a time. This helps relieve symptoms and prevent further damage to the tendons.    Changing the action that caused the problem. For instance, if the tendons were damaged from playing tennis, it may help to change your playing technique or use different equipment. This helps prevent further damage to the tendons.    Using cold packs. Putting an ice pack on the injured area can help reduce pain and swelling.    Taking pain medicines. Taking  prescription or over-the-counter pain medicines may help reduce pain and swelling.      Wearing a brace. This helps reduce strain on the muscles and tendons in the forearm, which may relieve symptoms. It's very important to wear the brace properly.    Doing exercises and physical therapy. These help improve strength and range of motion in the elbow, forearm, and wrist.    Getting shots of medicine into the injured area. These may help relieve symptoms for a time.    Having surgery. This may be an option if other treatments fail to relieve symptoms. In many cases, the surgeon removes the damaged tissue.  Possible complications of lateral epicondylitis  If the tendons involved don t heal properly, symptoms may return or get worse. To help prevent this, follow your treatment plan as directed.   When to call your healthcare provider  Call your healthcare provider right away if you have any of these:    Fever of 100.4 F (38 C) or higher, or as directed by your provider    Chills    Redness, swelling, or warmth in the elbow or forearm that gets worse    Symptoms that don t get better with treatment, or get worse    New symptoms  [employer] last reviewed this educational content on 6/1/2019 2000-2020 [employer]. All rights reserved. This information is not intended as a substitute for professional medical care. Always follow your healthcare professional's instructions.           Patient Education     Treating Tennis Elbow    Your treatment will depend on how inflamed your tendon is. The goal is to ease your symptoms and help you regain full use of your elbow.  Rest and medicine  Wear a tennis elbow splint to let the inflamed tendon rest and heal. It must be worn correctly. It should be placed down the arm past the painful area of the elbow. It can make symptoms worse if it's directly over the inflamed tendon. Take stress off the tendon by using your other hand or changing your . Take NSAIDs  (nonsteroidal anti-inflammatory drugs) and use ice to ease pain and swelling.  Exercises and therapy  Your healthcare provider may give you an exercise program. He or she may send you to a physical therapist. That expert will teach you how to gently stretch and strengthen the muscles around your elbow.  Anti-inflammatory shots  Your healthcare provider may give you shots of an anti-inflammatory medicine such as cortisone. This helps ease swelling. You may have more pain at first. But your elbow should feel better in a few days.  If surgery is needed  If your symptoms go on for a long time, or other treatments don t work, your healthcare provider may recommend surgery. Surgery repairs the inflamed tendon.  [employer] last reviewed this educational content on 1/1/2018 2000-2020 [employer]. All rights reserved. This information is not intended as a substitute for professional medical care. Always follow your healthcare professional's instructions.           Patient Education     Wrist Flexion (Flexibility)    1. Stand or sit and hold your arms straight out in front of you.  2. Dangle your right hand at the wrist. Gently press down on your right hand with your left hand. Feel the stretch in your right wrist and the top of your hand.  3. Hold for 30 to 60 seconds, then relax.  4. Switch arms and repeat 2 times.   [employer] last reviewed this educational content on 6/1/2019 2000-2020 [employer]. All rights reserved. This information is not intended as a substitute for professional medical care. Always follow your healthcare professional's instructions.           Patient Education     Hand and Wrist Exercises: Wrist Flexion    This exercise is designed to stretch your hands and wrists. Before beginning, read through all the instructions. While exercising, breathe normally. If you feel any pain, stop the exercise. If pain persists, tell your healthcare provider. Here are the steps for wrist  flexion:     Hold your right or left hand in front of you with your palm down and elbow bent.    Grasp the back of that hand with your other hand. Pull back so your fingers point down as you straighten your arm. Feel a stretch in your forearm and wrist. Hold for 3 to 5 seconds. Then relax.    Repeat 10 to 15 times. Do 3 to 4 sets a day.  [employer] last reviewed this educational content on 6/1/2020 2000-2020 [employer]. All rights reserved. This information is not intended as a substitute for professional medical care. Always follow your healthcare professional's instructions.           Patient Education     Hand and Wrist Exercises: Finger  and Release      This exercise stretches and strengthens your hands and wrists. Before starting, read through all the instructions. While exercising, breathe normally. If you feel any pain, stop the exercise. If pain persists, inform your healthcare provider.    Make a tight fist. (Or you can grasp a sponge or ball.) Hold for _____ seconds. Then relax.    Spread your fingers as far apart as possible. Hold for _____ seconds. Then relax.    Repeat _____ times for each hand. Do _____ sets a day.  [employer] last reviewed this educational content on 2/1/2018 2000-2020 [employer]. All rights reserved. This information is not intended as a substitute for professional medical care. Always follow your healthcare professional's instructions.           Patient Education     Wrist Extension (Flexibility)    1. Stand or sit and hold your arms straight out in front of you.  2. Bend your right hand back at the wrist. Gently pull back on your right hand with your left hand. Don t bend your fingertips backward. Feel the stretch in your right wrist.  3. Hold for 30 to 60 seconds, then relax. Repeat 2 times.  4. Switch arms and repeat.  [employer] last reviewed this educational content on 5/1/2020 2000-2020 [employer]. All rights reserved. This  information is not intended as a substitute for professional medical care. Always follow your healthcare professional's instructions.           Patient Education     Wrist Pronation (Flexibility)    Start with your right wrist. Repeat on your left wrist, if needed.  1. Sit with your right arm against your body and elbow bent. Support your right elbow with your left hand.  2. Hold your hand straight ahead, thumb up. Turn your hand to the left so your palm is down. Hold for 5 seconds. Then turn your hand back to thumb up and relax.  3. Repeat 10 times, or as instructed.  [employer] last reviewed this educational content on 6/1/2019 2000-2020 [employer]. All rights reserved. This information is not intended as a substitute for professional medical care. Always follow your healthcare professional's instructions.           Patient Education     Wrist Supination (Flexibility)    This exercise is for your right hand and wrist. Switch sides for your left hand and wrist.  1. Sit with your right arm against your body and elbow bent. Support your right elbow with your left hand.  2. Hold your hand straight ahead, thumb up. Turn your hand to the right so your palm is up. Hold for 5 seconds. Then turn your hand back to the thumb-up position and relax.  3. Repeat 10 times, or as instructed.  [employer] last reviewed this educational content on 6/1/2019 2000-2020 [employer]. All rights reserved. This information is not intended as a substitute for professional medical care. Always follow your healthcare professional's instructions.         10

## 2021-03-17 ENCOUNTER — TELEPHONE (OUTPATIENT)
Dept: FAMILY MEDICINE | Facility: CLINIC | Age: 36
End: 2021-03-17

## 2021-03-17 DIAGNOSIS — M79.622 PAIN OF LEFT UPPER ARM: Primary | ICD-10-CM

## 2021-03-17 DIAGNOSIS — M25.512 LEFT SHOULDER PAIN: ICD-10-CM

## 2021-03-17 DIAGNOSIS — M79.632 PAIN OF LEFT FOREARM: ICD-10-CM

## 2021-03-17 NOTE — TELEPHONE ENCOUNTER
About 3 weeks ago at school they did 30 holds in one week at school Left arm and left shoulder.  She was sore but got a massage. It was still tight. So she went to [place] to be seen on 3/9/2021 and was told to take a prednisone, ice and wear a tennis elbow brace.   She did that and non of that helped.     She is still in a lot of pain.     Went to the Chiropractor and took X-rays and hey said the neck is straight and that's where her pain is coming from. She is more pain now after getting adjusted in more areas in back and her headaches are back.  She cant sleep, been missing work and has restrictions from the Chiropractor.     Is she doing the right thing? She wants  advise on this.     Please advise  MP/MA

## 2021-03-17 NOTE — TELEPHONE ENCOUNTER
I would have her see Ortho for an evaluation to see if they could inject her elbow and then she may need more imaging.      Electronically signed by:  Dwayne Campos M.D.  3/17/2021

## 2021-03-18 NOTE — PROGRESS NOTES
ASSESSMENT & PLAN  1. Pain of left forearm    2. Acute pain of left shoulder          This issue is acute and persistent.  Final dx not clear, prognosis not yet clear.  More consistent with strain/overuse. Less likely cervical radiculopathy without clear dermatomal pattern, and Spurling neg.  We discussed the following: symptom treatment, activity modification/rest, imaging, rehab, chiropractic care, massage therapy, medication and potential for improvement with time. Following discussion, plan:  PT next, referral placed.  Updated letter for work, restrictions consistent with what she notes previously placed.  We discussed consideration of additional imaging of the cervical spine with MRI, hold for now with plan for PT.  F/u 4 weeks if persistent issues, sooner prn.  Questions answered. Discussed signs and symptoms that may indicate more serious issues; the patient was instructed to seek appropriate care if noted. Leah indicates understanding of these issues and agrees with the plan.            See Patient Instructions  Patient Instructions   Favor more muscular strain (diffuse, posterior shoulder, upper arm, forearm), though neck (cervical spine) source is not excluded.  Icing, heat, over-the-counter medication as needed.  Plan physical therapy next.  Referral placed.  We discussed consideration of additional imaging of the cervical spine with MRI, but this is not required currently given assessment and plan for other intervention.  Briefly discussed consideration for support options; you have already tried forearm strap, without benefit.  Hold with trial of wrist brace.  Okay to continue with chiropractic care if beneficial and desired.  Otherwise, monitor with physical therapy over the next 3 to 4 weeks.  If persistent issues, may consider above considerations, including C-spine imaging.    If you have any further questions for your physician or physician s care team you can call [phone] and use option 3 to  leave a voice message. Calls received during business hours will be returned same day.            DO TORI Rosas [facility] [place]      CC: Dwayne Campos      -----  Chief Complaint   Patient presents with     Left Shoulder - Pain     Left Forearm - Pain       SUBJECTIVE  Leah Casarez is a/an 35 year old female who is seen in consultation at the request of  Dwayne Campos M.D. for evaluation of left shoulder and forearm pain.    The patient is seen by themselves.  The patient is Right handed    Onset: 3 week(s) ago. 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  No specific injury. Feels cramping in hand with prolonged gripping.  Location of Pain: left arm. Original pain in anterior forearm and anterior shoulder.  Worsened by: now specific motions noted.  Better with: nothing  Treatments tried: Has tried chiropractic past week: took x-rays this week. Massage since incident. 3x/wk for the neck and just started this week. Tylenol, ibuprofen. Prednisone did not help.  Associated symptoms: tingling into long and middle finger.  Pain: At worst 9/10. 6/10 right now.  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    No family history pertinent to patient's problem today.   **  Pain is constant; notes posterior left shoulder, into left upper arm, forearm, into hand.  Didn't note neck area pain until chiropractic care on Monday (4 days ago).  No specific injury date, given that this has been building with time/repetition. Initially had diffuse soreness, did massage, but left arm pain persisted.  Mostly sharp pain in left arm. Sometimes tingling. With trying to  with left hand, gets left UE shooting pain.  X-ray of c-spine at chiropractor.  Tried forearm strap; wore for ~1 week, no real change with it.  Has been working. Had restrictions placed by chiropractor 4 days  ago; limited to 5-7 lbs lifting, and no holds.  No benefit flexeril or oral steroid.        REVIEW OF SYSTEMS:  Review of Systems   All other systems reviewed and are negative.        OBJECTIVE:  /68 (BP Location: Right arm, Patient Position: Sitting, Cuff Size: Adult Regular)   Wt 111.1 kg (245 lb)   BMI 36.92 kg/m     General: healthy, alert and in no distress  HEENT: no scleral icterus or conjunctival erythema  Skin: no suspicious lesions or rash. No jaundice.  CV: distal perfusion intact   Resp: normal respiratory effort without conversational dyspnea   Psych: normal mood and affect  Gait: normal steady gait with appropriate coordination and balance   Neuro: Normal light sensory exam of upper extremity     Cervical Spine Exam    Range of Motion:       forward flexion with some posterior left shoulder tightness         extension similar to with flexion        lateral flexion to right similar to above; to left no change    Inspection: Some loss of lordosis    Tender:      upper border of trapezius       left      Strength:     C4 (shoulder shrug)  symmetric 5/5       C5 (shoulder abduction) symmetric 5/5       C6 (elbow flexion) symmetric 5/5       C7 (elbow extension) symmetric 5/5       C8 (finger abduction, thumb flexion) symmetric 5/5    Sensation:     grossly intact througout bilateral upper extremities    Special Tests:     To right with Spurling equivocal, some posterior left shoulder pain  To left similar to right    Skin:     well perfused       capillary refill brisk    Lymphatics:      no edema noted in the upper extremities           Left Shoulder exam    ROM:      forward flexion full        abduction full, some upper arm lateral discomfort       internal rotation pain around shoulder and into left UE; to lower thoracic area       external rotation mild upper arm discomfort  After above motion, pain more around left shoulder    Tender:      periscapular region       Anterior  shoulder    Strength:      abduction 5/5       internal rotation 5/5       external rotation 4+/5    Impingement testing:      Mild pain with Mckeon       Mild pain with empty can          Left Elbow exam:    Inspection:     no ecchymosis       no edema or effusion    ROM:     full with flexion, extension, forearm supination and pronation.    Strength: pain with resisted wrist extension, long finger extension    Tender: dorsal wad    Non-Tender: remainder            Hand/wrist (left):    Inspection:  No deformity noted.  No swelling. No ecchymosis.    Motion:  Wrist flexion, extension with pain reproduced in forearm  Digit motion full    Strength:   full, some dorsal forearm pain    Radial pulses normal, +2/4, capillary refill brisk.      RADIOLOGY:  None today.  She showed me a lateral C-spine image on her phone, which appeared to show disc spaces preserved and some loss of lordosis, but not a formal x-ray review.      Review of prior external note(s) from - Outside records from primary care    38 minutes spent on the date of the encounter doing chart review, history and exam, documentation and further activities as noted above

## 2021-03-19 ENCOUNTER — OFFICE VISIT (OUTPATIENT)
Dept: ORTHOPEDICS | Facility: CLINIC | Age: 36
End: 2021-03-19
Payer: OTHER MISCELLANEOUS

## 2021-03-19 VITALS — DIASTOLIC BLOOD PRESSURE: 68 MMHG | WEIGHT: 245 LBS | SYSTOLIC BLOOD PRESSURE: 108 MMHG | BODY MASS INDEX: 36.92 KG/M2

## 2021-03-19 DIAGNOSIS — M25.512 ACUTE PAIN OF LEFT SHOULDER: ICD-10-CM

## 2021-03-19 DIAGNOSIS — M79.632 PAIN OF LEFT FOREARM: ICD-10-CM

## 2021-03-19 PROCEDURE — 99243 OFF/OP CNSLTJ NEW/EST LOW 30: CPT | Performed by: PEDIATRICS

## 2021-03-19 NOTE — LETTER
3/19/2021         RE: Leah Casarez  [address]  [place] [address]        Dear Colleague,    Thank you for referring your patient, Leah Casarez, to the [facility] [place]. Please see a copy of my visit note below.    ASSESSMENT & PLAN  1. Pain of left forearm    2. Acute pain of left shoulder          This issue is acute and persistent.  Final dx not clear, prognosis not yet clear.  More consistent with strain/overuse. Less likely cervical radiculopathy without clear dermatomal pattern, and Spurling neg.  We discussed the following: symptom treatment, activity modification/rest, imaging, rehab, chiropractic care, massage therapy, medication and potential for improvement with time. Following discussion, plan:  PT next, referral placed.  Updated letter for work, restrictions consistent with what she notes previously placed.  We discussed consideration of additional imaging of the cervical spine with MRI, hold for now with plan for PT.  F/u 4 weeks if persistent issues, sooner prn.  Questions answered. Discussed signs and symptoms that may indicate more serious issues; the patient was instructed to seek appropriate care if noted. Leah indicates understanding of these issues and agrees with the plan.            See Patient Instructions  Patient Instructions   Favor more muscular strain (diffuse, posterior shoulder, upper arm, forearm), though neck (cervical spine) source is not excluded.  Icing, heat, over-the-counter medication as needed.  Plan physical therapy next.  Referral placed.  We discussed consideration of additional imaging of the cervical spine with MRI, but this is not required currently given assessment and plan for other intervention.  Briefly discussed consideration for support options; you have already tried forearm strap, without benefit.  Hold with trial of wrist brace.  Okay to continue with chiropractic care if beneficial and  desired.  Otherwise, monitor with physical therapy over the next 3 to 4 weeks.  If persistent issues, may consider above considerations, including C-spine imaging.    If you have any further questions for your physician or physician s care team you can call [phone] and use option 3 to leave a voice message. Calls received during business hours will be returned same day.            Armond Hollis DO  [facility] [place]      CC: Dwayne Campos      -----  Chief Complaint   Patient presents with     Left Shoulder - Pain     Left Forearm - Pain       SUBJECTIVE  Leah Sangeeta Casarez is a/an 35 year old female who is seen in consultation at the request of  Dwayne Campos M.D. for evaluation of left shoulder and forearm pain.    The patient is seen by themselves.  The patient is Right handed    Onset: 3 week(s) ago. 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  No specific injury. Feels cramping in hand with prolonged gripping.  Location of Pain: left arm. Original pain in anterior forearm and anterior shoulder.  Worsened by: now specific motions noted.  Better with: nothing  Treatments tried: Has tried chiropractic past week: took x-rays this week. Massage since incident. 3x/wk for the neck and just started this week. Tylenol, ibuprofen. Prednisone did not help.  Associated symptoms: tingling into long and middle finger.  Pain: At worst 9/10. 6/10 right now.  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    No family history pertinent to patient's problem today.   **  Pain is constant; notes posterior left shoulder, into left upper arm, forearm, into hand.  Didn't note neck area pain until chiropractic care on Monday (4 days ago).  No specific injury date, given that this has been building with time/repetition. Initially had diffuse soreness, did massage, but left arm pain  persisted.  Mostly sharp pain in left arm. Sometimes tingling. With trying to  with left hand, gets left UE shooting pain.  X-ray of c-spine at chiropractor.  Tried forearm strap; wore for ~1 week, no real change with it.  Has been working. Had restrictions placed by chiropractor 4 days ago; limited to 5-7 lbs lifting, and no holds.  No benefit flexeril or oral steroid.        REVIEW OF SYSTEMS:  Review of Systems   All other systems reviewed and are negative.        OBJECTIVE:  /68 (BP Location: Right arm, Patient Position: Sitting, Cuff Size: Adult Regular)   Wt 111.1 kg (245 lb)   BMI 36.92 kg/m     General: healthy, alert and in no distress  HEENT: no scleral icterus or conjunctival erythema  Skin: no suspicious lesions or rash. No jaundice.  CV: distal perfusion intact   Resp: normal respiratory effort without conversational dyspnea   Psych: normal mood and affect  Gait: normal steady gait with appropriate coordination and balance   Neuro: Normal light sensory exam of upper extremity     Cervical Spine Exam    Range of Motion:       forward flexion with some posterior left shoulder tightness         extension similar to with flexion        lateral flexion to right similar to above; to left no change    Inspection: Some loss of lordosis    Tender:      upper border of trapezius       left      Strength:     C4 (shoulder shrug)  symmetric 5/5       C5 (shoulder abduction) symmetric 5/5       C6 (elbow flexion) symmetric 5/5       C7 (elbow extension) symmetric 5/5       C8 (finger abduction, thumb flexion) symmetric 5/5    Sensation:     grossly intact througout bilateral upper extremities    Special Tests:     To right with Spurling equivocal, some posterior left shoulder pain  To left similar to right    Skin:     well perfused       capillary refill brisk    Lymphatics:      no edema noted in the upper extremities           Left Shoulder exam    ROM:      forward flexion full        abduction full,  some upper arm lateral discomfort       internal rotation pain around shoulder and into left UE; to lower thoracic area       external rotation mild upper arm discomfort  After above motion, pain more around left shoulder    Tender:      periscapular region       Anterior shoulder    Strength:      abduction 5/5       internal rotation 5/5       external rotation 4+/5    Impingement testing:      Mild pain with Mckeon       Mild pain with empty can          Left Elbow exam:    Inspection:     no ecchymosis       no edema or effusion    ROM:     full with flexion, extension, forearm supination and pronation.    Strength: pain with resisted wrist extension, long finger extension    Tender: dorsal wad    Non-Tender: remainder            Hand/wrist (left):    Inspection:  No deformity noted.  No swelling. No ecchymosis.    Motion:  Wrist flexion, extension with pain reproduced in forearm  Digit motion full    Strength:   full, some dorsal forearm pain    Radial pulses normal, +2/4, capillary refill brisk.      RADIOLOGY:  None today.  She showed me a lateral C-spine image on her phone, which appeared to show disc spaces preserved and some loss of lordosis, but not a formal x-ray review.      Review of prior external note(s) from - Outside records from primary care    38 minutes spent on the date of the encounter doing chart review, history and exam, documentation and further activities as noted above            Again, thank you for allowing me to participate in the care of your patient.        Sincerely,        Armond Hollis DO

## 2021-03-19 NOTE — LETTER
[facility] [place]  [address]  [place] [address]  Phone: [phone]  Fax: [phone]      March 19, 2021      RE: Leah Casarez  [address] [place] [address]        To whom it may concern:    Leah Casarez is under my professional care. The employee is ABLE to return to work next scheduled work date.    When the patient returns to work, the following restrictions apply 4 weeks:  Continue with previously placed restrictions. Below is intended to correlate with previous restrictions.  Lift, carry, push, and pull: up to 5-7 lbs.  No restraint/holds.      Sincerely,            Armond GRIFFITH.

## 2021-04-26 ENCOUNTER — OFFICE VISIT (OUTPATIENT)
Dept: FAMILY MEDICINE | Facility: CLINIC | Age: 36
End: 2021-04-26
Payer: COMMERCIAL

## 2021-04-26 ENCOUNTER — HOSPITAL ENCOUNTER (OUTPATIENT)
Dept: GENERAL RADIOLOGY | Facility: CLINIC | Age: 36
Discharge: HOME | End: 2021-04-26
Attending: FAMILY MEDICINE | Admitting: FAMILY MEDICINE
Payer: COMMERCIAL

## 2021-04-26 VITALS
BODY MASS INDEX: 36.89 KG/M2 | OXYGEN SATURATION: 97 % | HEART RATE: 93 BPM | SYSTOLIC BLOOD PRESSURE: 136 MMHG | WEIGHT: 244.8 LBS | RESPIRATION RATE: 16 BRPM | DIASTOLIC BLOOD PRESSURE: 76 MMHG | TEMPERATURE: 97.4 F

## 2021-04-26 DIAGNOSIS — S99.912A ANKLE INJURY, LEFT, INITIAL ENCOUNTER: Primary | ICD-10-CM

## 2021-04-26 PROCEDURE — 99213 OFFICE O/P EST LOW 20 MIN: CPT | Performed by: FAMILY MEDICINE

## 2021-04-26 PROCEDURE — 73610 X-RAY EXAM OF ANKLE: CPT | Mod: LT

## 2021-04-26 RX ORDER — HYDROCODONE BITARTRATE AND ACETAMINOPHEN 5; 325 MG/1; MG/1
1 TABLET ORAL EVERY 6 HOURS PRN
Qty: 20 TABLET | Refills: 0 | Status: SHIPPED | OUTPATIENT
Start: 2021-04-26 | End: 2021-07-12

## 2021-04-26 ASSESSMENT — PAIN SCALES - GENERAL: PAINLEVEL: EXTREME PAIN (9)

## 2021-04-26 NOTE — PROGRESS NOTES
"    Assessment & Plan     Ankle injury, left, initial encounter  Appears to be a sprain she is put in a air stirrup splint nonweightbearing as much as possible some Vicodin for pain ice and elevate follow-up in a week to 10 days for dani-ray and reevaluation if not improving.  - XR Ankle Left G/E 3 Views  - HYDROcodone-acetaminophen (NORCO) 5-325 MG tablet; Take 1 tablet by mouth every 6 hours as needed for pain             BMI:   Estimated body mass index is 36.89 kg/m  as calculated from the following:    Height as of 3/9/21: 1.735 m (5' 8.31\").    Weight as of this encounter: 111 kg (244 lb 12.8 oz).           No follow-ups on file.    James Bucio MD  [facility]    Lucille Lynn is a 36 year old who presents for the following health issues: Injured her ankle writing down with her son on a water slide at a water park.  She twisted it somehow cannot remember things happened so quickly.  But she had pain thought she felt a pop and was unable to ambulate on it after this incident.  Iced and elevated last night.  Still very difficult to walk on it today she is crawling up and down steps.    HPI     Musculoskeletal problem/pain  Onset/Duration: 1 day  Description  Location: foot - left  Joint Swelling: YES  Redness: no  Pain: YES  Warmth: no  Intensity:  severe  Progression of Symptoms:  worsening  Accompanying signs and symptoms:   Fevers: no  Numbness/tingling/weakness: YES- weakness  History  Trauma to the area: YES- she thinks she heard a pop or crack  Recent illness:  no  Previous similar problem: no  Previous evaluation:  no  Precipitating or alleviating factors:  Aggravating factors include: sitting, standing, walking, climbing stairs, lifting, exercise and overuse  Therapies tried and outcome: rest/inactivity, ice, Ibuprofen and elevated        Review of Systems   Constitutional, HEENT, cardiovascular, pulmonary, gi and gu systems are negative, except as otherwise noted.    "   Objective    /76   Pulse 93   Temp 97.4  F (36.3  C) (Temporal)   Resp 16   Wt 111 kg (244 lb 12.8 oz)   LMP 04/23/2021   SpO2 97%   Breastfeeding No   BMI 36.89 kg/m    Body mass index is 36.89 kg/m .  Physical Exam   GENERAL: healthy, alert and no distress  EYES: Eyes grossly normal to inspection, PERRL and conjunctivae and sclerae normal  MS: Tenderness over the lateral malleolus on the left ankle and with inversion and dorsiflexion.  Not really tender over the upper and mid and lower fibula or the Achilles tendon area.  No ecchymosis.  X-ray shows no obvious fracture.  SKIN: no suspicious lesions or rashes  NEURO: Normal strength and tone, mentation intact and speech normal  PSYCH: mentation appears normal, affect normal/bright

## 2021-04-30 ENCOUNTER — MYC MEDICAL ADVICE (OUTPATIENT)
Dept: FAMILY MEDICINE | Facility: CLINIC | Age: 36
End: 2021-04-30

## 2021-04-30 NOTE — TELEPHONE ENCOUNTER
Spoke with patient, ortho or podiatry was offered she is comfortable seeing either one.  Patient was transferred to schedule.     Pura Rodriguez RN

## 2021-05-04 ENCOUNTER — OFFICE VISIT (OUTPATIENT)
Dept: PODIATRY | Facility: CLINIC | Age: 36
End: 2021-05-04
Payer: COMMERCIAL

## 2021-05-04 VITALS
WEIGHT: 244.8 LBS | BODY MASS INDEX: 37.1 KG/M2 | HEIGHT: 68 IN | DIASTOLIC BLOOD PRESSURE: 62 MMHG | SYSTOLIC BLOOD PRESSURE: 126 MMHG

## 2021-05-04 DIAGNOSIS — S93.402A SPRAIN OF LEFT ANKLE, UNSPECIFIED LIGAMENT, INITIAL ENCOUNTER: Primary | ICD-10-CM

## 2021-05-04 PROCEDURE — 99203 OFFICE O/P NEW LOW 30 MIN: CPT | Performed by: PODIATRIST

## 2021-05-04 ASSESSMENT — MIFFLIN-ST. JEOR: SCORE: 1863.83

## 2021-05-04 ASSESSMENT — PAIN SCALES - GENERAL: PAINLEVEL: SEVERE PAIN (7)

## 2021-05-04 NOTE — NURSING NOTE
Dispensed 1 Pneumatic Walking Brace, Size Large, with [facility] agreement signed by patient. Deann Billingsley CMA, May 4, 2021

## 2021-05-04 NOTE — LETTER
"    5/4/2021         RE: Leah Casarez  [address]  [facility] [address]        Dear Colleague,    Thank you for referring your patient, Leah Casarez, to the [facility]. Please see a copy of my visit note below.    HPI:  Leah Casarez is a 36 year old female who is seen in consultation at the request of James Bucio MD.    Pt presents for eval of:   (Onset, Location, L/R, Character, Treatments, Injury if yes)    XR Left ankle 4/26/2021     Onset 4/25/2021, while at a water park, riding tube with her young child and he was struggling once they got in the water and she pushed him up out of the water while planting her Left foot, instant pain and felt a \"pop\", onset bruising, unable to weightbear. Presents today with medial and posterior Left ankle and plantar Left heel pain, minimal NWB w/gel stirrup ankle brace and crutches, has a knee scooter at home.   Constant, swelling, dull ache, Intermittent sharp, throbbing, with certain ROM and weight bearing, pain 7  Rest, elevation, ice, ibuprofen    Works as  at [employer]. Did not miss any work due to this injury.    Weight management plan: Patient was referred to their PCP to discuss a diet and exercise plan.     ROS:  10 point ROS neg other than the symptoms noted above in the HPI.    Patient Active Problem List   Diagnosis     Gluten intolerance     Obesity (BMI 35.0-39.9 without comorbidity)     Seasonal allergic rhinitis, unspecified chronicity, unspecified trigger     Sprain of neck     Segmental dysfunction of cervical region     Segmental dysfunction of thoracic region     Tension headache     Segmental dysfunction of sacral region     Segmental dysfunction of lumbar region     Lumbago     HOLLY (generalized anxiety disorder)     Moderate major depression (H)     Injury of jaw, initial encounter (WC)     Nexplanon placed 2/09/21       PAST MEDICAL HISTORY:   Past Medical " History:   Diagnosis Date     Dislocation of symphysis pubis, initial encounter 11/1/2017     Nexplanon placed 2/09/21 2/9/2021     NO ACTIVE PROBLEMS (aka NONE)         PAST SURGICAL HISTORY:   Past Surgical History:   Procedure Laterality Date     LAPAROSCOPIC SALPINGECTOMY Bilateral 8/12/2019    Procedure: SALPINGECTOMY, LAPAROSCOPIC BILATERAL;  Surgeon: Dwayne Campos MD;  Location: [facility]     NO HISTORY OF SURGERY          MEDICATIONS:   Current Outpatient Medications:      etonogestrel (NEXPLANON) 68 MG IMPL, 1 each (68 mg) by Subdermal route once, Disp:  , Rfl:      fluticasone (FLONASE) 50 MCG/ACT nasal spray, Spray 2 sprays into both nostrils daily, Disp: 16 g, Rfl: 3     ibuprofen (ADVIL/MOTRIN) 200 MG capsule, Take 200 mg by mouth every 4 hours as needed for fever, Disp: , Rfl:      cyclobenzaprine (FLEXERIL) 5 MG tablet, Take 1-2 tablets (5-10 mg) by mouth 3 times daily as needed (muscle tension headaches), Disp: 90 tablet, Rfl: 1     HYDROcodone-acetaminophen (NORCO) 5-325 MG tablet, Take 1 tablet by mouth every 6 hours as needed for pain, Disp: 20 tablet, Rfl: 0     ALLERGIES:  No Known Allergies     SOCIAL HISTORY:   Social History     Socioeconomic History     Marital status:      Spouse name: Not on file     Number of children: Not on file     Years of education: Not on file     Highest education level: Not on file   Occupational History     Occupation: Special Ed   Social Needs     Financial resource strain: Not on file     Food insecurity     Worry: Not on file     Inability: Not on file     Transportation needs     Medical: Not on file     Non-medical: Not on file   Tobacco Use     Smoking status: Never Smoker     Smokeless tobacco: Never Used   Substance and Sexual Activity     Alcohol use: No     Drug use: No     Sexual activity: Yes     Partners: Male     Birth control/protection: Pill, Implant   Lifestyle     Physical activity     Days per week: Not on file     Minutes per session:  "Not on file     Stress: Not on file   Relationships     Social connections     Talks on phone: Not on file     Gets together: Not on file     Attends Cheondoism service: Not on file     Active member of club or organization: Not on file     Attends meetings of clubs or organizations: Not on file     Relationship status: Not on file     Intimate partner violence     Fear of current or ex partner: Not on file     Emotionally abused: Not on file     Physically abused: Not on file     Forced sexual activity: Not on file   Other Topics Concern     Parent/sibling w/ CABG, MI or angioplasty before 65F 55M? No   Social History Narrative    ** Merged History Encounter **         8/2018  Lives in [place] with , Demetrio and sons, Tabby and Kenton.  No smokers in the home.  No indoor cats/kittens.  No concerns about domestic violence.  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          FAMILY HISTORY:   Family History   Problem Relation Age of Onset     Arthritis Mother      No Known Problems Father      No Known Problems Maternal Grandmother      Diabetes Maternal Grandfather         Type II     Hyperlipidemia Paternal Grandmother      No Known Problems Paternal Grandfather      Depression Brother         6 yrs younger     Anxiety Disorder Brother         on meds     No Known Problems Son      No Known Problems Son      EXAM:Vitals: /62 (BP Location: Left arm, Patient Position: Sitting, Cuff Size: Adult Large)   Ht 1.735 m (5' 8.31\")   Wt 111 kg (244 lb 12.8 oz)   LMP 04/23/2021   BMI 36.88 kg/m    BMI= Body mass index is 36.88 kg/m .    General appearance: Patient is alert and fully cooperative with history & exam.  No sign of distress is noted during the visit.     Psychiatric: Affect is pleasant & appropriate.  Patient appears motivated to improve health.     Respiratory: Breathing is regular & unlabored while sitting.     HEENT: Hearing is intact to spoken word.  Speech is clear.  No gross evidence of " visual impairment that would impact ambulation.     Vascular: DP & PT pulses are intact & regular bilaterally.  No significant edema or varicosities noted.  CFT and skin temperature is normal to both lower extremities.     Neurologic: Lower extremity sensation is intact to light touch.  No evidence of weakness or contracture in the lower extremities.  No evidence of neuropathy.    Dermatologic: Skin is intact to both lower extremities with adequate texture, turgor and tone about the integument.  No paronychia or evidence of soft tissue infection is noted.     Musculoskeletal: Patient is ambulatory with Aircast stirrup and considerable pain about the distal 4 cm of the left fibula.  There is edema medial lateral ankle and pain across the anterior joint line for medial malleolus to lateral malleolus.  Less discomfort over the ATF and peroneal tendons and considerable discomfort is noted with any palpation of the distal fibula or compression of the distal tibia fibula left ankle.  Anterior drawer maneuver is guarded.    Radiographs: 3 views of the left ankle demonstrate no obvious diastases but no comparison views noted about the distal syndesmosis.  Also possible disruption of the cortex without step-off or displacement about the distal 2 cm of the fibula.  This is somewhat concerning.    ASSESSMENT:       ICD-10-CM    1. Sprain of left ankle, unspecified ligament, initial encounter  S93.402A         PLAN:  Reviewed patient's chart in [facility].      5/4/2021   It is unclear if this is a undisplaced fracture or syndesmosis injury versus a simple ankle sprain.  Unfortunately her distal fibula is very painful not the ATF which I would expect with a simple sprain.  Radiographs demonstrate possible disrupted cortex but no displacement.  We discussed obtaining a CT or MR to further evaluate versus immobilizing.  She wishes to be immobilized in a fracture boot which will likely reduce her pain as she is not had much progress in  the Aircast stirrup splint.    Fracture boot 24/7 because pain on the distal fibula and pain when squeezing the distal fibula and the tibia indicating possible syndesmosis sprain.  Patient has as much pain today as day in ED and not better. Indicates she is not immobilized well enough.    RTC 2 weeks and this will be much more clear.  Letter was dispensed for light duty work.  Continue compression during the day  Continue the fracture boot even during rest to keep the ankle at 90 degrees.  The fracture boot will reduce pain and improve the healing far better than narcotics which have been making her nauseated.      Armond Suarez DPM        Again, thank you for allowing me to participate in the care of your patient.        Sincerely,        Armond Suarez DPM

## 2021-05-04 NOTE — PROGRESS NOTES
"HPI:  Leah Casarez is a 36 year old female who is seen in consultation at the request of James Bucio MD.    Pt presents for eval of:   (Onset, Location, L/R, Character, Treatments, Injury if yes)    XR Left ankle 4/26/2021     Onset 4/25/2021, while at a water park, riding tube with her young child and he was struggling once they got in the water and she pushed him up out of the water while planting her Left foot, instant pain and felt a \"pop\", onset bruising, unable to weightbear. Presents today with medial and posterior Left ankle and plantar Left heel pain, minimal NWB w/gel stirrup ankle brace and crutches, has a knee scooter at home.   Constant, swelling, dull ache, Intermittent sharp, throbbing, with certain ROM and weight bearing, pain 7  Rest, elevation, ice, ibuprofen    Works as  at [employer]. Did not miss any work due to this injury.    Weight management plan: Patient was referred to their PCP to discuss a diet and exercise plan.     ROS:  10 point ROS neg other than the symptoms noted above in the HPI.    Patient Active Problem List   Diagnosis     Gluten intolerance     Obesity (BMI 35.0-39.9 without comorbidity)     Seasonal allergic rhinitis, unspecified chronicity, unspecified trigger     Sprain of neck     Segmental dysfunction of cervical region     Segmental dysfunction of thoracic region     Tension headache     Segmental dysfunction of sacral region     Segmental dysfunction of lumbar region     Lumbago     HOLLY (generalized anxiety disorder)     Moderate major depression (H)     Injury of jaw, initial encounter (WC)     Nexplanon placed 2/09/21       PAST MEDICAL HISTORY:   Past Medical History:   Diagnosis Date     Dislocation of symphysis pubis, initial encounter 11/1/2017     Nexplanon placed 2/09/21 2/9/2021     NO ACTIVE PROBLEMS (aka NONE)         PAST SURGICAL HISTORY:   Past Surgical History:   Procedure Laterality Date     LAPAROSCOPIC " SALPINGECTOMY Bilateral 8/12/2019    Procedure: SALPINGECTOMY, LAPAROSCOPIC BILATERAL;  Surgeon: Dwayne Campos MD;  Location: [facility]     NO HISTORY OF SURGERY          MEDICATIONS:   Current Outpatient Medications:      etonogestrel (NEXPLANON) 68 MG IMPL, 1 each (68 mg) by Subdermal route once, Disp:  , Rfl:      fluticasone (FLONASE) 50 MCG/ACT nasal spray, Spray 2 sprays into both nostrils daily, Disp: 16 g, Rfl: 3     ibuprofen (ADVIL/MOTRIN) 200 MG capsule, Take 200 mg by mouth every 4 hours as needed for fever, Disp: , Rfl:      cyclobenzaprine (FLEXERIL) 5 MG tablet, Take 1-2 tablets (5-10 mg) by mouth 3 times daily as needed (muscle tension headaches), Disp: 90 tablet, Rfl: 1     HYDROcodone-acetaminophen (NORCO) 5-325 MG tablet, Take 1 tablet by mouth every 6 hours as needed for pain, Disp: 20 tablet, Rfl: 0     ALLERGIES:  No Known Allergies     SOCIAL HISTORY:   Social History     Socioeconomic History     Marital status:      Spouse name: Not on file     Number of children: Not on file     Years of education: Not on file     Highest education level: Not on file   Occupational History     Occupation: Special Ed   Social Needs     Financial resource strain: Not on file     Food insecurity     Worry: Not on file     Inability: Not on file     Transportation needs     Medical: Not on file     Non-medical: Not on file   Tobacco Use     Smoking status: Never Smoker     Smokeless tobacco: Never Used   Substance and Sexual Activity     Alcohol use: No     Drug use: No     Sexual activity: Yes     Partners: Male     Birth control/protection: Pill, Implant   Lifestyle     Physical activity     Days per week: Not on file     Minutes per session: Not on file     Stress: Not on file   Relationships     Social connections     Talks on phone: Not on file     Gets together: Not on file     Attends Ep[employer] service: Not on file     Active member of club or organization: Not on file     Attends meetings of  "clubs or organizations: Not on file     Relationship status: Not on file     Intimate partner violence     Fear of current or ex partner: Not on file     Emotionally abused: Not on file     Physically abused: Not on file     Forced sexual activity: Not on file   Other Topics Concern     Parent/sibling w/ CABG, MI or angioplasty before 65F 55M? No   Social History Narrative    ** Merged History Encounter **         8/2018  Lives in [place] with , Demetrio and sons, Tabby and Kenton.  No smokers in the home.  No indoor cats/kittens.  No concerns about domestic violence.  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          FAMILY HISTORY:   Family History   Problem Relation Age of Onset     Arthritis Mother      No Known Problems Father      No Known Problems Maternal Grandmother      Diabetes Maternal Grandfather         Type II     Hyperlipidemia Paternal Grandmother      No Known Problems Paternal Grandfather      Depression Brother         6 yrs younger     Anxiety Disorder Brother         on meds     No Known Problems Son      No Known Problems Son      EXAM:Vitals: /62 (BP Location: Left arm, Patient Position: Sitting, Cuff Size: Adult Large)   Ht 1.735 m (5' 8.31\")   Wt 111 kg (244 lb 12.8 oz)   LMP 04/23/2021   BMI 36.88 kg/m    BMI= Body mass index is 36.88 kg/m .    General appearance: Patient is alert and fully cooperative with history & exam.  No sign of distress is noted during the visit.     Psychiatric: Affect is pleasant & appropriate.  Patient appears motivated to improve health.     Respiratory: Breathing is regular & unlabored while sitting.     HEENT: Hearing is intact to spoken word.  Speech is clear.  No gross evidence of visual impairment that would impact ambulation.     Vascular: DP & PT pulses are intact & regular bilaterally.  No significant edema or varicosities noted.  CFT and skin temperature is normal to both lower extremities.     Neurologic: Lower extremity sensation is " intact to light touch.  No evidence of weakness or contracture in the lower extremities.  No evidence of neuropathy.    Dermatologic: Skin is intact to both lower extremities with adequate texture, turgor and tone about the integument.  No paronychia or evidence of soft tissue infection is noted.     Musculoskeletal: Patient is ambulatory with Aircast stirrup and considerable pain about the distal 4 cm of the left fibula.  There is edema medial lateral ankle and pain across the anterior joint line for medial malleolus to lateral malleolus.  Less discomfort over the ATF and peroneal tendons and considerable discomfort is noted with any palpation of the distal fibula or compression of the distal tibia fibula left ankle.  Anterior drawer maneuver is guarded.    Radiographs: 3 views of the left ankle demonstrate no obvious diastases but no comparison views noted about the distal syndesmosis.  Also possible disruption of the cortex without step-off or displacement about the distal 2 cm of the fibula.  This is somewhat concerning.    ASSESSMENT:       ICD-10-CM    1. Sprain of left ankle, unspecified ligament, initial encounter  S93.402A         PLAN:  Reviewed patient's chart in [facility].      5/4/2021   It is unclear if this is a undisplaced fracture or syndesmosis injury versus a simple ankle sprain.  Unfortunately her distal fibula is very painful not the ATF which I would expect with a simple sprain.  Radiographs demonstrate possible disrupted cortex but no displacement.  We discussed obtaining a CT or MR to further evaluate versus immobilizing.  She wishes to be immobilized in a fracture boot which will likely reduce her pain as she is not had much progress in the Aircast stirrup splint.    Fracture boot 24/7 because pain on the distal fibula and pain when squeezing the distal fibula and the tibia indicating possible syndesmosis sprain.  Patient has as much pain today as day in ED and not better. Indicates she is not  immobilized well enough.    RTC 2 weeks and this will be much more clear.  Letter was dispensed for light duty work.  Continue compression during the day  Continue the fracture boot even during rest to keep the ankle at 90 degrees.  The fracture boot will reduce pain and improve the healing far better than narcotics which have been making her nauseated.      Armond Suarez, KISHA

## 2021-05-18 ENCOUNTER — OFFICE VISIT (OUTPATIENT)
Dept: PODIATRY | Facility: CLINIC | Age: 36
End: 2021-05-18
Payer: COMMERCIAL

## 2021-05-18 VITALS
HEIGHT: 68 IN | BODY MASS INDEX: 34.07 KG/M2 | SYSTOLIC BLOOD PRESSURE: 124 MMHG | WEIGHT: 224.8 LBS | DIASTOLIC BLOOD PRESSURE: 70 MMHG

## 2021-05-18 DIAGNOSIS — S93.402A SPRAIN OF LEFT ANKLE, UNSPECIFIED LIGAMENT, INITIAL ENCOUNTER: Primary | ICD-10-CM

## 2021-05-18 PROCEDURE — 99213 OFFICE O/P EST LOW 20 MIN: CPT | Performed by: PODIATRIST

## 2021-05-18 ASSESSMENT — PAIN SCALES - GENERAL: PAINLEVEL: MODERATE PAIN (5)

## 2021-05-18 ASSESSMENT — MIFFLIN-ST. JEOR: SCORE: 1773.11

## 2021-05-18 NOTE — PROGRESS NOTES
"Chief Complaint   Patient presents with     RECHECK     (3w2d) pain 5, NWB w/tall gray fx boot - Left ankle sprain, DOI 4/25/2021; LOV 5/4/2021       Weight management plan: Patient was referred to their PCP to discuss a diet and exercise plan.     HPI:  Leah Casarez is a 36 year old female who is seen in consultation at the request of James Bucio MD.    Pt presents for eval of:   (Onset, Location, L/R, Character, Treatments, Injury if yes)    XR Left ankle 4/26/2021     Onset 4/25/2021, while at a water park, riding tube with her young child and he was struggling once they got in the water and she pushed him up out of the water while planting her Left foot, instant pain and felt a \"pop\", onset bruising, unable to weightbear. Presents today with medial and posterior Left ankle and plantar Left heel pain, minimal NWB w/gel stirrup ankle brace and crutches, has a knee scooter at home.   Constant, swelling, dull ache, Intermittent sharp, throbbing, with certain ROM and weight bearing, pain 7  Rest, elevation, ice, ibuprofen    Works as  at [employer]. Did not miss any work due to this injury.    Since last visit she is much improved.  She notes still some puffiness in today describes only tenderness on the medial malleolus anterior joint line less about the lateral malleolus.  She has been nonweightbearing in a fracture boot 24/7 with a knee scooter.    EXAM:Vitals: /70 (BP Location: Left arm, Patient Position: Sitting, Cuff Size: Adult Large)   Ht 1.735 m (5' 8.31\")   Wt 102 kg (224 lb 12.8 oz)   LMP 04/23/2021   BMI 33.87 kg/m    BMI= Body mass index is 33.87 kg/m .    General appearance: Patient is alert and fully cooperative with history & exam.  No sign of distress is noted during the visit.     Psychiatric: Affect is pleasant & appropriate.  Patient appears motivated to improve health.     Respiratory: Breathing is regular & unlabored while sitting.     HEENT: " Hearing is intact to spoken word.  Speech is clear.  No gross evidence of visual impairment that would impact ambulation.     Vascular: DP & PT pulses are intact & regular bilaterally.  No significant edema or varicosities noted.  CFT and skin temperature is normal to both lower extremities.     Neurologic: Lower extremity sensation is intact to light touch.  No evidence of weakness or contracture in the lower extremities.  No evidence of neuropathy.    Dermatologic: Skin is intact to both lower extremities with adequate texture, turgor and tone about the integument.  No paronychia or evidence of soft tissue infection is noted.     Musculoskeletal: Today her clinical presentation is much different.  Still very subtle puffiness over the ATF ligament of the left lateral ankle.  Also discomfort today with firm palpation over the ATF and over the medial malleolus but no edema over the deltoid or medial malleolus.  No pain at all over the distal fibula otherwise as she had last visit.    ASSESSMENT:       ICD-10-CM    1. Sprain of left ankle, unspecified ligament, initial encounter  S93.402A         PLAN:  Reviewed patient's chart in [facility].      5/4/2021   It is unclear if this is a undisplaced fracture or syndesmosis injury versus a simple ankle sprain.  Unfortunately her distal fibula is very painful not the ATF which I would expect with a simple sprain.  Radiographs demonstrate possible disrupted cortex but no displacement.  We discussed obtaining a CT or MR to further evaluate versus immobilizing.  She wishes to be immobilized in a fracture boot which will likely reduce her pain as she is not had much progress in the Aircast stirrup splint.    Fracture boot 24/7 because pain on the distal fibula and pain when squeezing the distal fibula and the tibia indicating possible syndesmosis sprain.  Patient has as much pain today as day in ED and not better. Indicates she is not immobilized well enough.    RTC 2 weeks and this  will be much more clear.  Letter was dispensed for light duty work.  Continue compression during the day  Continue the fracture boot even during rest to keep the ankle at 90 degrees.  The fracture boot will reduce pain and improve the healing far better than narcotics which have been making her nauseated.    5/18/2021  Overall this patient is greatly improved  Stay in boot and can start weight bearing in boot as tolerated maybe work up to 4 hours.  If you are not tolerating weightbearing then stay nonweightbearing with knee scooter.  Stay in boot for rest and sleep  Can blow fan on boot to cool it  Follow up in two weeks  May consider physical therapy if expecting symptoms more than 1 week after this presentation or poor balance or range of motion.      Armond Suarez DPM
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
## 2021-06-03 ENCOUNTER — MYC MEDICAL ADVICE (OUTPATIENT)
Dept: FAMILY MEDICINE | Facility: CLINIC | Age: 36
End: 2021-06-03

## 2021-06-03 ENCOUNTER — OFFICE VISIT (OUTPATIENT)
Dept: PODIATRY | Facility: CLINIC | Age: 36
End: 2021-06-03
Payer: COMMERCIAL

## 2021-06-03 VITALS
SYSTOLIC BLOOD PRESSURE: 114 MMHG | BODY MASS INDEX: 34.07 KG/M2 | HEIGHT: 68 IN | DIASTOLIC BLOOD PRESSURE: 72 MMHG | WEIGHT: 224.8 LBS

## 2021-06-03 DIAGNOSIS — S93.402A SPRAIN OF LEFT ANKLE, UNSPECIFIED LIGAMENT, INITIAL ENCOUNTER: Primary | ICD-10-CM

## 2021-06-03 PROCEDURE — 99213 OFFICE O/P EST LOW 20 MIN: CPT | Performed by: PODIATRIST

## 2021-06-03 ASSESSMENT — MIFFLIN-ST. JEOR: SCORE: 1773.11

## 2021-06-03 ASSESSMENT — PAIN SCALES - GENERAL: PAINLEVEL: MODERATE PAIN (5)
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
## 2021-06-04 ENCOUNTER — HOSPITAL ENCOUNTER (OUTPATIENT)
Dept: MRI IMAGING | Facility: CLINIC | Age: 36
Discharge: HOME | End: 2021-06-04
Attending: PODIATRIST | Admitting: PODIATRIST
Payer: COMMERCIAL

## 2021-06-04 DIAGNOSIS — S93.402A SPRAIN OF LEFT ANKLE, UNSPECIFIED LIGAMENT, INITIAL ENCOUNTER: ICD-10-CM

## 2021-06-04 PROCEDURE — 73721 MRI JNT OF LWR EXTRE W/O DYE: CPT | Mod: LT

## 2021-06-04 PROCEDURE — 73721 MRI JNT OF LWR EXTRE W/O DYE: CPT | Mod: 26 | Performed by: RADIOLOGY

## 2021-06-06 ENCOUNTER — E-VISIT (OUTPATIENT)
Dept: FAMILY MEDICINE | Facility: CLINIC | Age: 36
End: 2021-06-06
Payer: COMMERCIAL

## 2021-06-06 DIAGNOSIS — B37.31 YEAST INFECTION OF THE VAGINA: Primary | ICD-10-CM

## 2021-06-06 PROCEDURE — 99422 OL DIG E/M SVC 11-20 MIN: CPT | Performed by: FAMILY MEDICINE

## 2021-06-07 RX ORDER — FLUCONAZOLE 150 MG/1
150 TABLET ORAL ONCE
Qty: 1 TABLET | Refills: 0 | Status: SHIPPED | OUTPATIENT
Start: 2021-06-07 | End: 2021-06-07

## 2021-06-09 ENCOUNTER — OFFICE VISIT (OUTPATIENT)
Dept: PODIATRY | Facility: CLINIC | Age: 36
End: 2021-06-09
Payer: COMMERCIAL

## 2021-06-09 VITALS
WEIGHT: 224.8 LBS | HEIGHT: 68 IN | DIASTOLIC BLOOD PRESSURE: 72 MMHG | BODY MASS INDEX: 34.07 KG/M2 | SYSTOLIC BLOOD PRESSURE: 110 MMHG

## 2021-06-09 DIAGNOSIS — M89.9 OSTEOCHONDRAL LESION OF TALAR DOME: Primary | ICD-10-CM

## 2021-06-09 DIAGNOSIS — M94.9 OSTEOCHONDRAL LESION OF TALAR DOME: Primary | ICD-10-CM

## 2021-06-09 PROCEDURE — 99213 OFFICE O/P EST LOW 20 MIN: CPT | Performed by: PODIATRIST

## 2021-06-09 ASSESSMENT — MIFFLIN-ST. JEOR: SCORE: 1773.11

## 2021-06-09 ASSESSMENT — PAIN SCALES - GENERAL: PAINLEVEL: MILD PAIN (3)
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
## 2021-06-09 NOTE — PATIENT INSTRUCTIONS
OCD = osteochondral defect or talar dome lesion of the ankle or talus specifically  Osteochondral fracture of talus or ankle.  Compression fracture of ankle or talar dome.

## 2021-06-09 NOTE — PROGRESS NOTES
"Chief Complaint   Patient presents with     Results     MRI Left ankle 6/4/2021     RECHECK     (6w3d) pain 3, WB w/tall gray fx boot - Left ankle sprain, DOI 4/25/2021; XR L ankle 4/26/2021; LOV 6/3/2021       Weight management plan: Patient was referred to their PCP to discuss a diet and exercise plan.     HPI:  Leah Casarez is a 36 year old female who is seen in consultation at the request of James Bucio MD.    Pt presents for eval of:   (Onset, Location, L/R, Character, Treatments, Injury if yes)    XR Left ankle 4/26/2021     Onset 4/25/2021, while at a water park, riding tube with her young child and he was struggling once they got in the water and she pushed him up out of the water while planting her Left foot, instant pain and felt a \"pop\", onset bruising, unable to weightbear. Presents today with medial and posterior Left ankle and plantar Left heel pain, minimal NWB w/gel stirrup ankle brace and crutches, has a knee scooter at home.   Constant, swelling, dull ache, Intermittent sharp, throbbing, with certain ROM and weight bearing, pain 7  Rest, elevation, ice, ibuprofen    Works as  at [employer]. Did not miss any work due to this injury.    Since last visit she is much improved.  She notes still some puffiness in today describes only tenderness on the medial malleolus anterior joint line less about the lateral malleolus.  She has been nonweightbearing in a fracture boot 24/7 with a knee scooter.    EXAM:Vitals: /72 (BP Location: Left arm, Patient Position: Sitting, Cuff Size: Adult Large)   Ht 1.735 m (5' 8.31\")   Wt 102 kg (224 lb 12.8 oz)   BMI 33.87 kg/m    BMI= Body mass index is 33.87 kg/m .    General appearance: Patient is alert and fully cooperative with history & exam.  No sign of distress is noted during the visit.     Psychiatric: Affect is pleasant & appropriate.  Patient appears motivated to improve health.     Respiratory: Breathing is " regular & unlabored while sitting.     HEENT: Hearing is intact to spoken word.  Speech is clear.  No gross evidence of visual impairment that would impact ambulation.     Vascular: DP & PT pulses are intact & regular bilaterally.  No significant edema or varicosities noted.  CFT and skin temperature is normal to both lower extremities.     Neurologic: Lower extremity sensation is intact to light touch.  No evidence of weakness or contracture in the lower extremities.  No evidence of neuropathy.    Dermatologic: Skin is intact to both lower extremities with adequate texture, turgor and tone about the integument.  No paronychia or evidence of soft tissue infection is noted.     Musculoskeletal: Today her clinical presentation is much different again.  Subtle puffiness remains over the ATF however she has most discomfort over the medial malleolus secondary over the posterior tibial tendon of the left ankle.  Also discomfort over the entire anterior joint line.  Guarding with range of motion throughout the left ankle still.  No mottling or dyshidrosis noted.  No venous congestion or dependent rubor is identified today on clinical exam.    MRI demonstrates osteochondral lesion fracture of the medial talar dome with the lateral one third of the talus dome demonstrating reactive edema.  There is a subtle defect in the cartilage noted on 1 or 2 images.  So it is fairly small.    ASSESSMENT:       ICD-10-CM    1. Osteochondral lesion of talar dome  M89.9 PHYSICAL THERAPY REFERRAL    M94.9         PLAN:  Reviewed patient's chart in [facility].      5/4/2021   It is unclear if this is a undisplaced fracture or syndesmosis injury versus a simple ankle sprain.  Unfortunately her distal fibula is very painful not the ATF which I would expect with a simple sprain.  Radiographs demonstrate possible disrupted cortex but no displacement.  We discussed obtaining a CT or MR to further evaluate versus immobilizing.  She wishes to be  immobilized in a fracture boot which will likely reduce her pain as she is not had much progress in the Aircast stirrup splint.    Fracture boot 24/7 because pain on the distal fibula and pain when squeezing the distal fibula and the tibia indicating possible syndesmosis sprain.  Patient has as much pain today as day in ED and not better. Indicates she is not immobilized well enough.    RTC 2 weeks and this will be much more clear.  Letter was dispensed for light duty work.  Continue compression during the day  Continue the fracture boot even during rest to keep the ankle at 90 degrees.  The fracture boot will reduce pain and improve the healing far better than narcotics which have been making her nauseated.    5/18/2021  Overall this patient is greatly improved  Stay in boot and can start weight bearing in boot as tolerated maybe work up to 4 hours.  If you are not tolerating weightbearing then stay nonweightbearing with knee scooter.  Stay in boot for rest and sleep  Can blow fan on boot to cool it  Follow up in two weeks  May consider physical therapy if expecting symptoms more than 1 week after this presentation or poor balance or range of motion.    6/3/2021  MRI left ankle.   She is not progressing like she should.  She is having pain in the posterior tibial tendon today and along the medial malleolus.  Also still pain and edema over the ATF and anterior ankle joint line.  She is not responding as I would expect after nearly 6 weeks of immobilization.  Stay in the fracture boot for now even during rest and follow-up after the MRI.    6/9/2021  Reviewed MRI  Order to begin PT for osteochondral defect medial shoulder of the talus.  Stay in the boot for weightbearing activities to tolerance for another 2 weeks or essentially 6-8 weeks since her date of injury.  She has been in the fracture boot now for about 4 weeks.  She can remove the fracture boot at night to sleep if she would like.  She has no known previous  injuries to her ankle and no chronic ankle instability.  Physical therapy can discontinue the boot even at home 2 weeks from now as long as the patient is tolerating therapy and activities.  Avoid any high impact activities until she has recuperated this significantly.  That may require more than a month from now.  Follow-up with me in 1 month.      Armond Suarez DPM

## 2021-06-22 ENCOUNTER — HOSPITAL ENCOUNTER (OUTPATIENT)
Dept: PHYSICAL THERAPY | Facility: CLINIC | Age: 36
Setting detail: THERAPIES SERIES
End: 2021-06-22
Attending: FAMILY MEDICINE
Payer: COMMERCIAL

## 2021-06-22 DIAGNOSIS — M94.9 OSTEOCHONDRAL LESION OF TALAR DOME: ICD-10-CM

## 2021-06-22 DIAGNOSIS — M89.9 OSTEOCHONDRAL LESION OF TALAR DOME: ICD-10-CM

## 2021-06-22 PROCEDURE — 97110 THERAPEUTIC EXERCISES: CPT | Mod: GP | Performed by: PHYSICAL THERAPIST

## 2021-06-22 PROCEDURE — 97161 PT EVAL LOW COMPLEX 20 MIN: CPT | Mod: GP | Performed by: PHYSICAL THERAPIST

## 2021-06-25 ENCOUNTER — HOSPITAL ENCOUNTER (OUTPATIENT)
Dept: PHYSICAL THERAPY | Facility: CLINIC | Age: 36
Setting detail: THERAPIES SERIES
End: 2021-06-25
Attending: PODIATRIST
Payer: COMMERCIAL

## 2021-06-25 PROCEDURE — 97110 THERAPEUTIC EXERCISES: CPT | Mod: GP | Performed by: PHYSICAL THERAPIST

## 2021-06-25 PROCEDURE — 97112 NEUROMUSCULAR REEDUCATION: CPT | Mod: GP | Performed by: PHYSICAL THERAPIST

## 2021-06-29 ENCOUNTER — HOSPITAL ENCOUNTER (OUTPATIENT)
Dept: PHYSICAL THERAPY | Facility: CLINIC | Age: 36
Setting detail: THERAPIES SERIES
End: 2021-06-29
Attending: PODIATRIST
Payer: COMMERCIAL

## 2021-06-29 PROCEDURE — 97140 MANUAL THERAPY 1/> REGIONS: CPT | Mod: GP | Performed by: PHYSICAL THERAPIST

## 2021-06-29 PROCEDURE — 97110 THERAPEUTIC EXERCISES: CPT | Mod: GP | Performed by: PHYSICAL THERAPIST

## 2021-07-12 ENCOUNTER — OFFICE VISIT (OUTPATIENT)
Dept: PODIATRY | Facility: CLINIC | Age: 36
End: 2021-07-12
Payer: COMMERCIAL

## 2021-07-12 ENCOUNTER — HOSPITAL ENCOUNTER (OUTPATIENT)
Dept: PHYSICAL THERAPY | Facility: CLINIC | Age: 36
Setting detail: THERAPIES SERIES
End: 2021-07-12
Attending: PODIATRIST
Payer: COMMERCIAL

## 2021-07-12 VITALS
BODY MASS INDEX: 33.95 KG/M2 | WEIGHT: 224 LBS | HEIGHT: 68 IN | TEMPERATURE: 97 F | DIASTOLIC BLOOD PRESSURE: 60 MMHG | SYSTOLIC BLOOD PRESSURE: 124 MMHG

## 2021-07-12 DIAGNOSIS — S93.402A SPRAIN OF LEFT ANKLE, UNSPECIFIED LIGAMENT, INITIAL ENCOUNTER: ICD-10-CM

## 2021-07-12 DIAGNOSIS — M89.9 OSTEOCHONDRAL LESION OF TALAR DOME: Primary | ICD-10-CM

## 2021-07-12 DIAGNOSIS — M94.9 OSTEOCHONDRAL LESION OF TALAR DOME: Primary | ICD-10-CM

## 2021-07-12 PROCEDURE — 99213 OFFICE O/P EST LOW 20 MIN: CPT | Performed by: PODIATRIST

## 2021-07-12 PROCEDURE — 97110 THERAPEUTIC EXERCISES: CPT | Mod: GP | Performed by: PHYSICAL THERAPIST

## 2021-07-12 ASSESSMENT — MIFFLIN-ST. JEOR: SCORE: 1769.32

## 2021-07-12 ASSESSMENT — PAIN SCALES - GENERAL: PAINLEVEL: NO PAIN (1)

## 2021-07-12 NOTE — LETTER
"    7/12/2021         RE: Leah Casarez  [address]  [place] [facility] [place] [id number]        Dear Colleague,    Thank you for referring your patient, Leah Casarez, to the [facility]. Please see a copy of my visit note below.      Chief Complaint   Patient presents with     RECHECK     pain 1, WB w/tall gray fx boot - Left ankle sprain, DOI 4/25/2021; XR L ankle 4/26/2021; LOV 6/9/2021     Weight management plan: Patient was referred to their PCP to discuss a diet and exercise plan.     HPI:  Leah Casarez is a 36 year old female who is seen in consultation at the request of James Bucio MD.    Pt presents for eval of:   (Onset, Location, L/R, Character, Treatments, Injury if yes)    XR Left ankle 4/26/2021       Onset 4/25/2021, while at a water park, riding tube with her young child and he was struggling once they got in the water and she pushed him up out of the water while planting her Left foot, instant pain and felt a \"pop\", onset bruising, unable to weightbear. Presents today with medial and posterior Left ankle and plantar Left heel pain, minimal NWB w/gel stirrup ankle brace and crutches, has a knee scooter at home.   Constant, swelling, dull ache, Intermittent sharp, throbbing, with certain ROM and weight bearing, pain was 7/10  Rest, elevation, ice, ibuprofen  Pain is 1-5/10.       Works as  at [employer]. Did not miss any work due to this injury.    Since last visit she is much improved.  She notes still some puffiness in today describes only tenderness on the medial malleolus anterior joint line less about the lateral malleolus.  She has been nonweightbearing in a fracture boot 24/7 with a knee scooter.    EXAM:Vitals: /60   Temp 97  F (36.1  C) (Temporal)   Ht 1.735 m (5' 8.3\")   Wt 101.6 kg (224 lb)   BMI 33.76 kg/m    BMI= Body mass index is 33.76 kg/m .    General appearance: Patient is alert and fully cooperative " with history & exam.  No sign of distress is noted during the visit.     Psychiatric: Affect is pleasant & appropriate.  Patient appears motivated to improve health.     Respiratory: Breathing is regular & unlabored while sitting.     HEENT: Hearing is intact to spoken word.  Speech is clear.  No gross evidence of visual impairment that would impact ambulation.     Vascular: DP & PT pulses are intact & regular bilaterally.  No significant edema or varicosities noted.  CFT and skin temperature is normal to both lower extremities.     Neurologic: Lower extremity sensation is intact to light touch.  No evidence of weakness or contracture in the lower extremities.  No evidence of neuropathy.    Dermatologic: Skin is intact to both lower extremities with adequate texture, turgor and tone about the integument.  No paronychia or evidence of soft tissue infection is noted.     Musculoskeletal: Today her clinical presentation is much different again.  Subtle puffiness remains over the ATF however she has most discomfort over the medial malleolus secondary over the posterior tibial tendon of the left ankle.  Also discomfort over the entire anterior joint line.  Guarding with range of motion throughout the left ankle still.  No mottling or dyshidrosis noted.  No venous congestion or dependent rubor is identified today on clinical exam.    MRI demonstrates osteochondral lesion fracture of the medial talar dome with the lateral one third of the talus dome demonstrating reactive edema.  There is a subtle defect in the cartilage noted on 1 or 2 images.  So it is fairly small.    ASSESSMENT:       ICD-10-CM    1. Osteochondral lesion of talar dome  M89.9 Orthotics, Prosthetics and Custom Compression Order for DME - ONLY FOR DME    M94.9    2. Sprain of left ankle, unspecified ligament, initial encounter  S93.402A Orthotics, Prosthetics and Custom Compression Order for DME - ONLY FOR DME        PLAN:  Reviewed patient's chart in  epic.      5/4/2021   It is unclear if this is a undisplaced fracture or syndesmosis injury versus a simple ankle sprain.  Unfortunately her distal fibula is very painful not the ATF which I would expect with a simple sprain.  Radiographs demonstrate possible disrupted cortex but no displacement.  We discussed obtaining a CT or MR to further evaluate versus immobilizing.  She wishes to be immobilized in a fracture boot which will likely reduce her pain as she is not had much progress in the Aircast stirrup splint.    Fracture boot 24/7 because pain on the distal fibula and pain when squeezing the distal fibula and the tibia indicating possible syndesmosis sprain.  Patient has as much pain today as day in ED and not better. Indicates she is not immobilized well enough.    RTC 2 weeks and this will be much more clear.  Letter was dispensed for light duty work.  Continue compression during the day  Continue the fracture boot even during rest to keep the ankle at 90 degrees.  The fracture boot will reduce pain and improve the healing far better than narcotics which have been making her nauseated.    5/18/2021  Overall this patient is greatly improved  Stay in boot and can start weight bearing in boot as tolerated maybe work up to 4 hours.  If you are not tolerating weightbearing then stay nonweightbearing with knee scooter.  Stay in boot for rest and sleep  Can blow fan on boot to cool it  Follow up in two weeks  May consider physical therapy if expecting symptoms more than 1 week after this presentation or poor balance or range of motion.    6/3/2021  MRI left ankle.   She is not progressing like she should.  She is having pain in the posterior tibial tendon today and along the medial malleolus.  Also still pain and edema over the ATF and anterior ankle joint line.  She is not responding as I would expect after nearly 6 weeks of immobilization.  Stay in the fracture boot for now even during rest and follow-up after the  MRI.    6/9/2021  Reviewed MRI  Order to begin PT for osteochondral defect medial shoulder of the talus.  Stay in the boot for weightbearing activities to tolerance for another 2 weeks or essentially 6-8 weeks since her date of injury.  She has been in the fracture boot now for about 4 weeks.  She can remove the fracture boot at night to sleep if she would like.  She has no known previous injuries to her ankle and no chronic ankle instability.  Physical therapy can discontinue the boot even at home 2 weeks from now as long as the patient is tolerating therapy and activities.  Avoid any high impact activities until she has recuperated this significantly.  That may require more than a month from now.  Follow-up with me in 1 month.    7/12/2021  Progress out of boot now as tolerated  Start good shoes and orthotics RX today  Continue PT as tolerated until balance and strength is recuperated or no longer improving  Follow up in 5 weeks     We reviewed alternative treatment options.  Overall her pain averages 1-5/10 and she does not want to pursue any surgical treatments at this time.  All questions were answered.  Follow-up again in 5 weeks.       Armond Suarez DPM      Again, thank you for allowing me to participate in the care of your patient.        Sincerely,        Armond Suarez DPM

## 2021-07-12 NOTE — PROGRESS NOTES
"  Chief Complaint   Patient presents with     RECHECK     pain 1, WB w/tall gray fx boot - Left ankle sprain, DOI 4/25/2021; XR L ankle 4/26/2021; LOV 6/9/2021     Weight management plan: Patient was referred to their PCP to discuss a diet and exercise plan.     HPI:  Leah Casarez is a 36 year old female who is seen in consultation at the request of James Bucio MD.    Pt presents for eval of:   (Onset, Location, L/R, Character, Treatments, Injury if yes)    XR Left ankle 4/26/2021       Onset 4/25/2021, while at a water park, riding tube with her young child and he was struggling once they got in the water and she pushed him up out of the water while planting her Left foot, instant pain and felt a \"pop\", onset bruising, unable to weightbear. Presents today with medial and posterior Left ankle and plantar Left heel pain, minimal NWB w/gel stirrup ankle brace and crutches, has a knee scooter at home.   Constant, swelling, dull ache, Intermittent sharp, throbbing, with certain ROM and weight bearing, pain was 7/10  Rest, elevation, ice, ibuprofen  Pain is 1-5/10.       Works as  at [employer]. Did not miss any work due to this injury.    Since last visit she is much improved.  She notes still some puffiness in today describes only tenderness on the medial malleolus anterior joint line less about the lateral malleolus.  She has been nonweightbearing in a fracture boot 24/7 with a knee scooter.    EXAM:Vitals: /60   Temp 97  F (36.1  C) (Temporal)   Ht 1.735 m (5' 8.3\")   Wt 101.6 kg (224 lb)   BMI 33.76 kg/m    BMI= Body mass index is 33.76 kg/m .    General appearance: Patient is alert and fully cooperative with history & exam.  No sign of distress is noted during the visit.     Psychiatric: Affect is pleasant & appropriate.  Patient appears motivated to improve health.     Respiratory: Breathing is regular & unlabored while sitting.     HEENT: Hearing is intact to " spoken word.  Speech is clear.  No gross evidence of visual impairment that would impact ambulation.     Vascular: DP & PT pulses are intact & regular bilaterally.  No significant edema or varicosities noted.  CFT and skin temperature is normal to both lower extremities.     Neurologic: Lower extremity sensation is intact to light touch.  No evidence of weakness or contracture in the lower extremities.  No evidence of neuropathy.    Dermatologic: Skin is intact to both lower extremities with adequate texture, turgor and tone about the integument.  No paronychia or evidence of soft tissue infection is noted.     Musculoskeletal: Today her clinical presentation is much different again.  Subtle puffiness remains over the ATF however she has most discomfort over the medial malleolus secondary over the posterior tibial tendon of the left ankle.  Also discomfort over the entire anterior joint line.  Guarding with range of motion throughout the left ankle still.  No mottling or dyshidrosis noted.  No venous congestion or dependent rubor is identified today on clinical exam.    MRI demonstrates osteochondral lesion fracture of the medial talar dome with the lateral one third of the talus dome demonstrating reactive edema.  There is a subtle defect in the cartilage noted on 1 or 2 images.  So it is fairly small.    ASSESSMENT:       ICD-10-CM    1. Osteochondral lesion of talar dome  M89.9 Orthotics, Prosthetics and Custom Compression Order for DME - ONLY FOR DME    M94.9    2. Sprain of left ankle, unspecified ligament, initial encounter  S93.402A Orthotics, Prosthetics and Custom Compression Order for DME - ONLY FOR DME        PLAN:  Reviewed patient's chart in [facility].      5/4/2021   It is unclear if this is a undisplaced fracture or syndesmosis injury versus a simple ankle sprain.  Unfortunately her distal fibula is very painful not the ATF which I would expect with a simple sprain.  Radiographs demonstrate possible  disrupted cortex but no displacement.  We discussed obtaining a CT or MR to further evaluate versus immobilizing.  She wishes to be immobilized in a fracture boot which will likely reduce her pain as she is not had much progress in the Aircast stirrup splint.    Fracture boot 24/7 because pain on the distal fibula and pain when squeezing the distal fibula and the tibia indicating possible syndesmosis sprain.  Patient has as much pain today as day in ED and not better. Indicates she is not immobilized well enough.    RTC 2 weeks and this will be much more clear.  Letter was dispensed for light duty work.  Continue compression during the day  Continue the fracture boot even during rest to keep the ankle at 90 degrees.  The fracture boot will reduce pain and improve the healing far better than narcotics which have been making her nauseated.    5/18/2021  Overall this patient is greatly improved  Stay in boot and can start weight bearing in boot as tolerated maybe work up to 4 hours.  If you are not tolerating weightbearing then stay nonweightbearing with knee scooter.  Stay in boot for rest and sleep  Can blow fan on boot to cool it  Follow up in two weeks  May consider physical therapy if expecting symptoms more than 1 week after this presentation or poor balance or range of motion.    6/3/2021  MRI left ankle.   She is not progressing like she should.  She is having pain in the posterior tibial tendon today and along the medial malleolus.  Also still pain and edema over the ATF and anterior ankle joint line.  She is not responding as I would expect after nearly 6 weeks of immobilization.  Stay in the fracture boot for now even during rest and follow-up after the MRI.    6/9/2021  Reviewed MRI  Order to begin PT for osteochondral defect medial shoulder of the talus.  Stay in the boot for weightbearing activities to tolerance for another 2 weeks or essentially 6-8 weeks since her date of injury.  She has been in the  fracture boot now for about 4 weeks.  She can remove the fracture boot at night to sleep if she would like.  She has no known previous injuries to her ankle and no chronic ankle instability.  Physical therapy can discontinue the boot even at home 2 weeks from now as long as the patient is tolerating therapy and activities.  Avoid any high impact activities until she has recuperated this significantly.  That may require more than a month from now.  Follow-up with me in 1 month.    7/12/2021  Progress out of boot now as tolerated  Start good shoes and orthotics RX today  Continue PT as tolerated until balance and strength is recuperated or no longer improving  Follow up in 5 weeks     We reviewed alternative treatment options.  Overall her pain averages 1-5/10 and she does not want to pursue any surgical treatments at this time.  All questions were answered.  Follow-up again in 5 weeks.       Armond Suarez DPM

## 2021-07-20 ENCOUNTER — HOSPITAL ENCOUNTER (OUTPATIENT)
Dept: PHYSICAL THERAPY | Facility: CLINIC | Age: 36
Setting detail: THERAPIES SERIES
End: 2021-07-20
Attending: PODIATRIST
Payer: COMMERCIAL

## 2021-07-20 PROCEDURE — 97110 THERAPEUTIC EXERCISES: CPT | Mod: GP | Performed by: PHYSICAL THERAPIST

## 2021-07-27 ENCOUNTER — HOSPITAL ENCOUNTER (OUTPATIENT)
Dept: PHYSICAL THERAPY | Facility: CLINIC | Age: 36
Setting detail: THERAPIES SERIES
End: 2021-07-27
Attending: PODIATRIST
Payer: COMMERCIAL

## 2021-07-27 PROCEDURE — 97112 NEUROMUSCULAR REEDUCATION: CPT | Mod: GP | Performed by: PHYSICAL THERAPIST

## 2021-07-27 PROCEDURE — 97110 THERAPEUTIC EXERCISES: CPT | Mod: GP | Performed by: PHYSICAL THERAPIST

## 2021-08-04 ENCOUNTER — HOSPITAL ENCOUNTER (OUTPATIENT)
Dept: PHYSICAL THERAPY | Facility: CLINIC | Age: 36
Setting detail: THERAPIES SERIES
End: 2021-08-04
Attending: PODIATRIST
Payer: COMMERCIAL

## 2021-08-04 PROCEDURE — 97014 ELECTRIC STIMULATION THERAPY: CPT | Mod: GP | Performed by: PHYSICAL THERAPIST

## 2021-08-04 PROCEDURE — 97112 NEUROMUSCULAR REEDUCATION: CPT | Mod: GP | Performed by: PHYSICAL THERAPIST
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
## 2021-08-04 NOTE — PROGRESS NOTES
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 Notes she has always had some ache/mild symptoms. She notices symptoms increase when laying or sitting. She has her foot elevated with PF position.     Objective Measurements:  Objective Measure: LEFS  Details:   Objective Measure: Supine ankle AROM (Supine AROM R/L: DF: 4 degrees/1-2 degrees with a little pain - really tight;  PF: 67 degrees/47 degrees; forefoot: ADDuction:16 degrees/17 degrees;   ABDuction: 14 degrees/7 degrees)  Details: DF 0 degrees, PF: 42 degrees  - decreased from 4 degrees, PF 47 degrees last session  Objective Measure: Symptoms and tenderness  Details: medial ankle joint, posterior fibula and achilles tendon.   Objective Measure: WB without boot  Details: decreased due to symptoms     Goals:  Goal Identifier TEAM GOAL: AROM   Goal Description Leah will achieve full L ankle AROM based on her R ankle so she can meet her goals as noted below (decreased from last ROM due to flare up. Had been WNL)   Target Date 21   Date Met      Progress (detail required for progress note): decreased from last ROM due to flare up. Had been WNL)     Goal Identifier TEAM GOAL: Strength   Goal Description Leah will have equal strength for L ankle without pain compared to the R ankle to meet her goals as noted below (feels her ankle strength has been improving, more sturdy)   Target Date 21   Date Met      Progress (detail required for progress note): (feels her ankle strength has been improving, more sturdy)     Goal Identifier Leah's GOALS: Walking   Goal Description Leah will be able to walk 200 feet x 1 in a shoe without increased  symptoms (200 feet with symptoms - dull or mild symptoms)   Target Date 09/01/21   Date Met      Progress (detail required for progress note): (200 feet with symptoms - dull or mild symptoms     Goal Identifier Leah's GOAL: Normal activities   Goal Description Leah will be able to participate in her family's activities as previous without increased symptoms as indicated by 70+/80 on LEFS (27/80 today)   Target Date 08/26/21   Date Met      Progress (detail required for progress note): 27/80 today     Plan:  Changes to therapy plan of care: Calm symptoms and then gradual return to previous level. She has appt with Dr. Suarez at end of August.     Discharge:  No

## 2021-08-10 ENCOUNTER — HOSPITAL ENCOUNTER (OUTPATIENT)
Dept: PHYSICAL THERAPY | Facility: CLINIC | Age: 36
Setting detail: THERAPIES SERIES
End: 2021-08-10
Attending: PODIATRIST
Payer: COMMERCIAL

## 2021-08-10 ENCOUNTER — TRANSFERRED RECORDS (OUTPATIENT)
Dept: HEALTH INFORMATION MANAGEMENT | Facility: CLINIC | Age: 36
End: 2021-08-10

## 2021-08-10 PROCEDURE — 97140 MANUAL THERAPY 1/> REGIONS: CPT | Mod: GP | Performed by: PHYSICAL THERAPIST

## 2021-08-17 ENCOUNTER — HOSPITAL ENCOUNTER (OUTPATIENT)
Dept: PHYSICAL THERAPY | Facility: CLINIC | Age: 36
Setting detail: THERAPIES SERIES
End: 2021-08-17
Attending: PODIATRIST
Payer: COMMERCIAL

## 2021-08-17 PROCEDURE — 97110 THERAPEUTIC EXERCISES: CPT | Mod: GP | Performed by: PHYSICAL THERAPIST

## 2021-09-07 ENCOUNTER — OFFICE VISIT (OUTPATIENT)
Dept: PODIATRY | Facility: CLINIC | Age: 36
End: 2021-09-07
Payer: COMMERCIAL

## 2021-09-07 VITALS
WEIGHT: 224 LBS | DIASTOLIC BLOOD PRESSURE: 70 MMHG | BODY MASS INDEX: 33.95 KG/M2 | HEIGHT: 68 IN | SYSTOLIC BLOOD PRESSURE: 116 MMHG

## 2021-09-07 DIAGNOSIS — M89.9 OSTEOCHONDRAL LESION OF TALAR DOME: Primary | ICD-10-CM

## 2021-09-07 DIAGNOSIS — S93.402A SPRAIN OF LEFT ANKLE, UNSPECIFIED LIGAMENT, INITIAL ENCOUNTER: ICD-10-CM

## 2021-09-07 DIAGNOSIS — M94.9 OSTEOCHONDRAL LESION OF TALAR DOME: Primary | ICD-10-CM

## 2021-09-07 PROCEDURE — 99213 OFFICE O/P EST LOW 20 MIN: CPT | Performed by: PODIATRIST

## 2021-09-07 ASSESSMENT — MIFFLIN-ST. JEOR: SCORE: 1769.32

## 2021-09-07 ASSESSMENT — PAIN SCALES - GENERAL: PAINLEVEL: MILD PAIN (3)

## 2021-09-07 NOTE — PATIENT INSTRUCTIONS
Sturdy and reliable ankle braces are most readily available on line, [employer], or [employer] and delivered for around $15-40.  Health insurance often does not pay for this.    Procare double strap ankle support   Tri Lock ankle brace   Figure of 8 ankle brace  Sweedo ankle brace
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
## 2021-09-09 ENCOUNTER — HOSPITAL ENCOUNTER (OUTPATIENT)
Dept: PHYSICAL THERAPY | Facility: CLINIC | Age: 36
Setting detail: THERAPIES SERIES
End: 2021-09-09
Attending: PODIATRIST
Payer: COMMERCIAL

## 2021-09-09 PROCEDURE — 97110 THERAPEUTIC EXERCISES: CPT | Mod: GP | Performed by: PHYSICAL THERAPIST

## 2021-09-14 ENCOUNTER — HOSPITAL ENCOUNTER (OUTPATIENT)
Dept: PHYSICAL THERAPY | Facility: CLINIC | Age: 36
Setting detail: THERAPIES SERIES
End: 2021-09-14
Attending: PODIATRIST
Payer: COMMERCIAL

## 2021-09-14 PROCEDURE — 97112 NEUROMUSCULAR REEDUCATION: CPT | Mod: GP | Performed by: PHYSICAL THERAPIST

## 2021-09-14 PROCEDURE — 97110 THERAPEUTIC EXERCISES: CPT | Mod: GP | Performed by: PHYSICAL THERAPIST

## 2021-09-15 NOTE — ADDENDUM NOTE
Addended by: JACQUES GARCIA on: 9/24/2018 08:36 PM     Modules accepted: Orders     Advance Care Planning   Healthcare Decision Maker:    Primary Decision Maker: Hakeem Galindo - [phone]    Click here to complete Healthcare Decision Makers including selection of the Healthcare Decision Maker Relationship (ie \"Primary\"). DISCHARGE ORDER  Date/Time 9/15/2021 1:32 PM  Completed by: Patrick Roblero RN, Case Management    Patient Name: Rubi Elder    : 1960      Admit order Date and Status:2021  Noted discharge order. (verify MD's last order for status of admission/Traditional Medicare 3 [place] Inpatient qualifying stay required for SNF)    Confirmed discharge plan with:              Patient:  Yes              When pt confirms DC plan does any support person need to be contacted by CM No if yes who______n/a                      Discharge to Facility: [address] phone number for staff giving report: Name: [place], [place]   Address: [address] [place] [address] 411, [address] Fax [phone]     Pre-certification completed: yes, approved per [facility] with [facility] Exemption Notification (HENS) completed: not needed   Discharge orders and Continuity of Care faxed to facility:  [phone]  Transportation:               Medical Transport explained with choice list offered to pt/family. Choice:Yes(no preference)  Agency used: [facility]   time:   3458-4327      Pt/family/Nursing/Facility aware of  time: yes  Yes Names: pt, Deonte Arzate with Casa Chandler RN, ---pt states she will call family  Ambulance form completed:  yes:      Comments:faxed AGUSTÍN/AVS to [facility] to [phone]. Rapid Covd not needed to d/c per Debo with [facility]. Last Rapid Covid was on 2021. Dereck Schlatter, GRICELDA, agree with d/c, and wrote about wound vac. Pt is [facility]'d [facility]. Pt's O2 sats are 96% on 2L. Discharge timeout done with Nicholas Dickey RN. All discharge needs and concerns addressed. Discharging nurse to complete AGUSTÍN, reconcile AVS, and place final copy with patient's discharge packet.  Discharging RN to ensure that written INTERDISCIPLINARY PLAN OF CARE CONFERENCE    Date/Time: 9/10/2021 2:00 PM  Completed by: Gregory Armenta RN, Case Management      Patient Name:  Laddie Buerger  YOB: 1960  Admitting Diagnosis: Severe sepsis ([facility] [address] [A41.9, R65.20]     Admit Date/Time:  9/5/2021  6:42 PM    Chart reviewed. Interdisciplinary team contacted or reviewed plan related to patient progress and discharge plans. Disciplines included Case Management, Nursing, and Dietitian. Current Status:PCU; has wound vac; on cardizem gtt  PT/OT recommendation for discharge plan of care: SNF    Expected D/C Disposition:  Skilled nursing facility  Confirmed plan with patient and/or family Yes confirmed with: (name) pt  Met with:pt  Discharge Plan Comments: Chart reviewed. Met with pt and SNF list provided, pt would like (1)[facility] (2) [facility]. TC to [facility] and referral sent. Precert waived at this time.  Follow    Home O2 in place on admit: ecf  Pt informed of need to bring portable home O2 tank on day of discharge for nursing to connect prior to leaving:  Not Indicated  Verbalized agreement/Understanding:  Not Indicated INTERDISCIPLINARY PLAN OF CARE CONFERENCE    Date/Time: 9/13/2021 1:16 PM  Completed by: Bonni Sicard, RN, Case Management      Patient Name:  Jonathon Chan  YOB: 1960  Admitting Diagnosis: Severe sepsis ([address] [A41.9, R65.20]     Admit Date/Time:  9/5/2021  6:42 PM    Chart reviewed. Interdisciplinary team contacted or reviewed plan related to patient progress and discharge plans. Disciplines included Case Management, Nursing, and Dietitian. Current Status:PCU; wound vac with irrigation  PT/OT recommendation for discharge plan of care:     Expected D/C Disposition:  tbd  Confirmed plan with patient and/or family Yes confirmed with: (name) pt  Met with:pt  Discharge Plan Comments: Chart reviewed. Griselda here from [facility] and states they are not able to accept pt due to size of wound. Met with pt and wound care RN at bedside. Per wound care RN Ajay Gilliland pt is appropriate for LTAC referral due to size of wound. Pt is agreeable to LTAC, LTAC list given to pt and she would like referral called to Leon Jay @ [facility] at this time. Awaiting call back. 1545 TC to Debo with [facility] and she states pt is under review. Will follow.     Home O2 in place on admit: Yes-prn use with [facility]  Pt informed of need to bring portable home O2 tank on day of discharge for nursing to connect prior to leaving:  Not Indicated  Verbalized agreement/Understanding:  Not Indicated INTERDISCIPLINARY PLAN OF CARE CONFERENCE    Date/Time: 9/14/2021 10:45 AM  Completed by: Celine Dubois RN, Case Management      Patient Name:  Andriy Thompson  YOB: 1960  Admitting Diagnosis: Severe sepsis ([address] [A41.9, R65.20]     Admit Date/Time:  9/5/2021  6:42 PM    Chart reviewed. Interdisciplinary team contacted or reviewed plan related to patient progress and discharge plans. Disciplines included Case Management, Nursing, and Dietitian. Current Status:ongoing  PT/OT recommendation for discharge plan of care: SNF    Expected D/C Disposition:  [facility]  Confirmed plan with patient and/or family Yes confirmed with: (name) pt  Met with:pt  Discharge Plan Comments: Reviewed chart. Role of discharge planner explained and patient verbalized understanding. Pt states that  She lives with her significant other. She is aware that PT/OT recommends SNF. [facility]. INGRID spoke with Debo with [facility] and states pt was approved for [facility]. Elfida Factor, as pt lives in [place] and has [facility] as a secondary. Kae Michaud is starting precert today (0/81/7273). Pt is aware. Dr. Azul Jennings is aware. Pt is active with [facility] for home O2. Prn. Home O2 in place on admit: yes   Pt informed of need to bring portable home O2 tank on day of discharge for nursing to connect prior to leaving:  Yes  Verbalized agreement/Understanding:   Yes INTERDISCIPLINARY PLAN OF CARE CONFERENCE    Date/Time: 9/8/2021 2:39 PM  Completed by: John Curry RN, Case Management      Patient Name:  Garima Beyer  YOB: 1960  Admitting Diagnosis: Severe sepsis ([address] [A41.9, R65.20]     Admit Date/Time:  9/5/2021  6:42 PM    Chart reviewed. Interdisciplinary team contacted or reviewed plan related to patient progress and discharge plans. Disciplines included Case Management, Nursing, and Dietitian. Current Status: IP 09/06/2021  PT/OT recommendation for discharge plan of care: NA    Expected D/C Disposition:  Home  Confirmed plan   Discharge Plan Comments: Chart reviewed. Pt will DC home with [facility] for WC.  [facility] RN liaison is following. Possible wound vac for home. Possible Wound VAC. Referral for benefits check called to [facility]. Possible DC over weekend.   Home O2 in place on admit: No  Pt informed of need to bring portable home O2 tank on day of discharge for nursing to connect prior to leaving:  Not Indicated  Verbalized agreement/Understanding:  Not Indicated [facility]  Received referral regarding [facility] [employer] services with dressing changes from [place]. Sent request to [facility] for SN coverage for dressing changes with plan for discharge in next 1-2 days. Pt will need assistance at home for daily dressing changes. Bedside nurse to send home 5 days of dressing supplies with pt at discharge. 12:35 [facility] is able to service pt for SN [facility] 9/9-9/10. Liaison to follow up with pt/CM prior to discharge regarding dressing change/wound care needs.       Electronically signed by James Mandel RN on 9/7/2021 at 12:23 PM Received call from [facility] that they are OON with insurance. M for [facility] [phone]. [facility]  Spoke with Natalie Farley CM in follow up. Aware of plan for SNF at discharge. Updated [facility].     Electronically signed by Shashank Guerrier RN on 9/10/2021 at 2:09 PM [facility]  Spoke with Flaco Lee CM in follow up. Pt may discharge home with a wound vac. Next dressing change at [facility] due Friday 9/10. Notified [facility] of possible discharge 9/10 with wound [facility] Saturday 9/11.     Electronically signed by Roxanna Alcantara RN on 9/8/2021 at 2:59 PM Yes    Community Service Affiliation  Dialysis:  No    · Agency:  · Location:  · Dialysis Schedule:  · Phone:   · Fax: Other Community Services:Dr Jessica Cespedes for pain management     DISCHARGE PLAN: Explained Case Management role/services. Chart reviewed. Met with pt at bedside and she states she stays between her house and her SO apt and plans to return to SO apt at discharge. Pt ambulates with cane +drives. Pt has [address] for home [address] and has portable tanks, pt currently on 2L and states she use O2 PRN at home, pt aware to have SO bring portable O2 tank when she is discharged. Will follow. Pt will require drsg changes at home and is agreeable to [facility]. Pt states she will return to SO apt:  [facility] [address] [facility]  Pt has no preference in agency, [facility] list provided and pt is agreeable to referral to [facility]. TC to [facility] and referral sent at this time.

## 2021-10-03 ENCOUNTER — HEALTH MAINTENANCE LETTER (OUTPATIENT)
Age: 36
End: 2021-10-03

## 2021-10-19 PROBLEM — F32.9 MAJOR DEPRESSION: Status: ACTIVE | Noted: 2019-09-17

## 2021-11-05 ENCOUNTER — MYC MEDICAL ADVICE (OUTPATIENT)
Dept: FAMILY MEDICINE | Facility: CLINIC | Age: 36
End: 2021-11-05
Payer: COMMERCIAL

## 2021-11-05 DIAGNOSIS — N39.46 MIXED INCONTINENCE URGE AND STRESS (MALE)(FEMALE): Primary | ICD-10-CM

## 2021-11-17 ENCOUNTER — HOSPITAL ENCOUNTER (OUTPATIENT)
Dept: PHYSICAL THERAPY | Facility: CLINIC | Age: 36
Setting detail: THERAPIES SERIES
End: 2021-11-17
Attending: FAMILY MEDICINE
Payer: COMMERCIAL

## 2021-11-17 DIAGNOSIS — N39.46 MIXED INCONTINENCE URGE AND STRESS (MALE)(FEMALE): ICD-10-CM

## 2021-11-17 PROCEDURE — 90912 BFB TRAINING 1ST 15 MIN: CPT | Mod: GP | Performed by: PHYSICAL THERAPIST

## 2021-11-17 PROCEDURE — 97535 SELF CARE MNGMENT TRAINING: CPT | Mod: GP,59 | Performed by: PHYSICAL THERAPIST

## 2021-11-17 PROCEDURE — 97161 PT EVAL LOW COMPLEX 20 MIN: CPT | Mod: GP | Performed by: PHYSICAL THERAPIST

## 2021-11-23 NOTE — PROGRESS NOTES
11/17/21 1540   General Information   Type of Visit Initial OP Ortho PT Evaluation   Start of Care Date 11/17/21   Referring Physician Dwayne Campos MD   Patient/Family Goals Statement improve bladder control   Orders Evaluate and Treat   Date of Order 11/17/21   Certification Required? No   Medical Diagnosis mixed incontinence   Surgical/Medical history reviewed Yes   Precautions/Limitations no known precautions/limitations       Present No   Body Part(s)   Body Part(s) Pelvic Floor Dysfunction   Current Level of Function   Patient role/employment history A. Employed;H. 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 No   Have you fallen and had an injury in the past year? No   Is patient a fall risk? No   Abuse Screen (yes response referral indicated)   Feels Unsafe at Home or Work/School no   Feels Threatened by Someone no   Does Anyone Try to Keep You From Having Contact with Others or Doing Things Outside Your Home? no   Physical Signs of Abuse Present no   Specific Questions   Specific Questions Pelvic Floor Dysfunction;Pregnancy;Women's Health   Pelvic Floor Dysfunction Questions   Regular exercise No   Fluid intake-glasses/day (one glass/cup = 8oz 100-160oz   Caffeinated beverages-glasses/day 8oz   Alcoholic beverages - glasses/day 0-1; on weekend   Recent diet change? No   How long can you delay the need to urinate?  <5min   How many times do you wake to urinate at night?   2-3   How often do you urinate during the day?   every 30-60min   Can you stop the flow of urine when on the toilet?  No   Is the volume of urine passed usually  Medium   Do you have the sensation that you need to go to the toilet?  Yes   Do you empty your bladder frequently, before you experience the urge to pass  urine?  Yes   Number of bladder infections last year?  0   Frequency of bowel movements:  1-2   Consistency of stool?  Soft formed   Do you ignore the urge to defecate?  Yes   Women's Health Questions   Number of pregnancies  3   Number of vaginal deliveries  3   Number of  section deliveries  0   Number of episiotomies  0   Women's Health Comments no significant tears or issues with labor   Pelvic Floor Dysfunction Objective Findings   Biofeedback electrode placement Perianal   Position Supine   Baseline EMG Pelvic Muscles (mv) 3.8; max 5.3   Peak Pelvic Muscle Contraction Work -avg 2 sec (mv) 11.3   Peak Pelvic Muscle Contraction Work-avg 10 sec (mv) 6.1   Power (MMT at Levator Ani) 2-   Endurance (Up to 10 seconds as long as still 50% power) 2   Repetitions (Contract 10 seconds or MVC, rest 4 seconds and count max number of reps) 6   Elevation (Able to lift posterior vaginal wall toward head and pubic bone) Absent   Medications None   Pelvic Floor Dysfunction Comments significant gluteal and abdominal activation; paradoxical contraction   Planned Therapy Interventions   Planned Therapy Interventions ADL retraining;IADL retraining;fine motor coordination training;joint mobilization;manual therapy;motor coordination training;neuromuscular re-education;strengthening;ROM   Planned Modality Interventions   Planned Modality Interventions Biofeedback   Clinical Impression   Criteria for Skilled Therapeutic Interventions Met yes, treatment indicated   PT Diagnosis mixed incontinence   Influenced by the following impairments muscle weakness, muscle dysfunction   Functional limitations due to impairments bladder control with ADLs, recreational activities   Clinical Presentation Stable/Uncomplicated   Clinical Presentation Rationale clinical judgement   Clinical Decision Making (Complexity) Low complexity   Therapy Frequency 1 time/week   Predicted Duration of Therapy Intervention (days/wks) 12weeks   Risk &  Benefits of therapy have been explained Yes   Patient, Family & other staff in agreement with plan of care Yes   Pelvic Health Informed Consent Statement Discussed with patient/guardian reason for referral regarding pelvic health needs and external/internal pelvic floor muscle examination.  Opportunity provided to ask questions and verbal consent for assessment and intervention was given.   Clinical Impression Comments The patient is a 37yo female who was referred to outpatient physical therapy for evaluation and treatment of mixed incontinence; no significant comorbidities or environmental factors are expected to prolong POC.  The patient reports mixed incontinence with daily activities.  Skilled physical therapy is required in order to improve control with daily activities.   Education Assessment   Preferred Learning Style Listening;Pictures/video   Barriers to Learning No barriers   ORTHO GOALS   PT Ortho Eval Goals 1;2;3   Ortho Goal 1   Goal Identifier 1   Goal Description The patient will be able to report 50% improvement in frequency symptoms in order to improve bladder control.   Target Date 02/09/22   Ortho Goal 2   Goal Identifier 2   Goal Description The patient will report 50% improvement in stress incontinence.    Target Date 02/09/22   Ortho Goal 3   Goal Identifier 3   Goal Description The patient will deny issue with urge incontinence in order to improve bladder control.   Target Date 02/09/22   Total Evaluation Time   PT Eval, Low Complexity Minutes (24038) 30

## 2021-11-30 ENCOUNTER — TRANSFERRED RECORDS (OUTPATIENT)
Dept: HEALTH INFORMATION MANAGEMENT | Facility: CLINIC | Age: 36
End: 2021-11-30
Payer: COMMERCIAL

## 2021-12-03 ENCOUNTER — TELEPHONE (OUTPATIENT)
Dept: PHYSICAL THERAPY | Facility: CLINIC | Age: 36
End: 2021-12-03
Payer: COMMERCIAL

## 2021-12-09 ENCOUNTER — TELEPHONE (OUTPATIENT)
Dept: PHYSICAL THERAPY | Facility: CLINIC | Age: 36
End: 2021-12-09
Payer: COMMERCIAL

## 2021-12-22 ENCOUNTER — HOSPITAL ENCOUNTER (OUTPATIENT)
Dept: PHYSICAL THERAPY | Facility: CLINIC | Age: 36
Setting detail: THERAPIES SERIES
End: 2021-12-22
Attending: FAMILY MEDICINE
Payer: COMMERCIAL

## 2021-12-22 PROCEDURE — 90912 BFB TRAINING 1ST 15 MIN: CPT | Mod: GP | Performed by: PHYSICAL THERAPIST

## 2021-12-22 PROCEDURE — 97535 SELF CARE MNGMENT TRAINING: CPT | Mod: GP,59 | Performed by: PHYSICAL THERAPIST

## 2021-12-22 PROCEDURE — 97110 THERAPEUTIC EXERCISES: CPT | Mod: GP,59 | Performed by: PHYSICAL THERAPIST

## 2022-01-03 ENCOUNTER — MYC MEDICAL ADVICE (OUTPATIENT)
Dept: FAMILY MEDICINE | Facility: CLINIC | Age: 37
End: 2022-01-03
Payer: COMMERCIAL

## 2022-01-03 DIAGNOSIS — H69.92 DYSFUNCTION OF LEFT EUSTACHIAN TUBE: Primary | ICD-10-CM

## 2022-01-04 RX ORDER — FLUTICASONE PROPIONATE 50 MCG
2 SPRAY, SUSPENSION (ML) NASAL DAILY
Qty: 16 G | Refills: 3 | Status: SHIPPED | OUTPATIENT
Start: 2022-01-04

## 2022-01-06 ENCOUNTER — TELEPHONE (OUTPATIENT)
Dept: PHYSICAL THERAPY | Facility: OTHER | Age: 37
End: 2022-01-06
Payer: COMMERCIAL

## 2022-01-18 ENCOUNTER — E-VISIT (OUTPATIENT)
Dept: FAMILY MEDICINE | Facility: CLINIC | Age: 37
End: 2022-01-18
Payer: COMMERCIAL

## 2022-01-18 DIAGNOSIS — J01.91 ACUTE RECURRENT SINUSITIS, UNSPECIFIED LOCATION: Primary | ICD-10-CM

## 2022-01-18 PROCEDURE — 99422 OL DIG E/M SVC 11-20 MIN: CPT | Performed by: FAMILY MEDICINE

## 2022-01-18 NOTE — DISCHARGE SUMMARY
[facility]    Outpatient Physical Therapy Discharge Note  Patient: Leah Casarez  : 1985    Beginning/End Dates of Reporting Period:  4 sessions 8- through 2021 for a total of 11 sessions.     Referring Provider: Armond Suarez DPM    Therapy Diagnosis: Decreased ROM and strength of L ankle      Client Self Report: At the time of her last session, she reported:  She is not wearing her brace at work and she has been able to tolerate this. 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 She is bringing her brace with her as needed. She is very pleased with the calming of her symptoms. Symptoms are more along the calf muscles vs the anteromedial aspect of her L ankle. She also reports she fell. She was carrying her 3 yo going into [employer] and she stepped up onto the cement curb surrounding a garden area filled with wood chips. She reports she completed the step up as if she was performing her step ups for her exercise (on her toes) and lost her balance falling. She and her daughter landed on the wood chips and both were fine wtihout ankle pain.     Objective Measurements:  At the time of her last session:  Objective Measure: Supine ankle AROM (Supine AROM R/L: DF: 4 degrees/1-2 degrees with a little pain - really tight;  PF: 67 degrees/47 degrees; forefoot: ADDuction:16 degrees/17 degrees;   ABDuction: 14 degrees/7 degrees)  Details: not today  Objective Measure: circumference  Details: very minimal     Goals:  Goal Identifier TEAM GOAL: AROM   Goal Description Leah will achieve full L ankle AROM based on her R ankle so she can meet her goals as noted below   Target Date 21   Date Met      Progress (detail required for progress note):       Goal Identifier TEAM GOAL: Strength   Goal Description Leah will have equal strength for L ankle without pain compared to the R ankle to meet  her goals as noted below   Target Date 10/10/21   Date Met      Progress (detail required for progress note):       Goal Identifier Leah's GOALS: Walking   Goal Description Leah will be able to walk 200 feet x 1 in a shoe without increased symptoms   Target Date 10/01/21   Date Met      Progress (detail required for progress note):       Goal Identifier Leah's GOAL: Normal activities   Goal Description Leah will be able to participate in her family's activities as previous without increased symptoms as indicated by 70+/80 on LEFS   Target Date 10/30/21   Date Met      Progress (detail required for progress note):     See objective measurements. No recent assessment of goals.     Plan:  Discharge from therapy.    Discharge:    Reason for Discharge: Patient has failed to schedule further appointments. Did not return phone call for follow up as we discussed.     Equipment Issued: band    Discharge Plan: Recommend continue with home program and follow up with provider as planned.

## 2022-01-19 RX ORDER — CEPHALEXIN 500 MG/1
500 CAPSULE ORAL 2 TIMES DAILY
Qty: 20 CAPSULE | Refills: 0 | Status: SHIPPED | OUTPATIENT
Start: 2022-01-19 | End: 2022-01-28

## 2022-01-19 NOTE — PATIENT INSTRUCTIONS
Dear Leah Casarez    After reviewing your responses, I've been able to diagnose you with?a sinus infection caused by bacteria.?     Based on your responses and diagnosis, I have prescribed cephalexin to treat your symptoms. I have sent this to your pharmacy.?     It is also important to stay well hydrated, get lots of rest and take over-the-counter decongestants,?tylenol?or ibuprofen if you?are able to?take those medications per your primary care provider to help relieve discomfort.?     It is important that you take?all of?your prescribed medication even if your symptoms are improving after a few doses.? Taking?all of?your medicine helps prevent the symptoms from returning.?     If your symptoms worsen, you develop severe headache, vomiting, high fever (>102), or are not improving in 7 days, please contact your primary care provider for an appointment or visit any of our convenient Walk-in Care or Urgent Care Centers to be seen which can be found on our website?here.?     Thanks again for choosing?us?as your health care partner,?   ?  Electronically signed by:  Dwayne Campos M.D.  1/19/2022

## 2022-01-19 NOTE — TELEPHONE ENCOUNTER
Provider E-Visit time total (minutes): 12    I spoke with the patient.  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  They have all been negative.    Electronically signed by:  Dwayne Campos M.D.  1/19/2022

## 2022-01-26 NOTE — PROGRESS NOTES
[facility]    Outpatient Physical Therapy Discharge Note  Patient: Leah Casarez  : 1985    Beginning/End Dates of Reporting Period:  21 to 21    Referring Provider: Dwayne Campos MD    Therapy Diagnosis: mixed incontinence     Client Self Report: Pt reports minimal change in incontinence symptoms, continued high urgency.  Reports illness throughout household causing increased stress    Objective Measurements:  Objective Measure: Biofeedback  Details: resting average 3.0         Goals:  Goal Identifier 1   Goal Description The patient will be able to report 50% improvement in frequency symptoms in order to improve bladder control.   Target Date 22   Date Met      Progress (detail required for progress note):       Goal Identifier 2   Goal Description The patient will report 50% improvement in stress incontinence.    Target Date 22   Date Met      Progress (detail required for progress note):       Goal Identifier 3   Goal Description The patient will deny issue with urge incontinence in order to improve bladder control.   Target Date 22   Date Met      Progress (detail required for progress note):           Plan:  Discharge from therapy.    Discharge:    Reason for Discharge: Patient has failed to schedule further appointments. Patient was called  to check status      Daily Bucio, PT, DPT, OCS, CSCS  [facility]  [phone]

## 2022-01-28 ENCOUNTER — OFFICE VISIT (OUTPATIENT)
Dept: FAMILY MEDICINE | Facility: OTHER | Age: 37
End: 2022-01-28
Payer: COMMERCIAL

## 2022-01-28 ENCOUNTER — NURSE TRIAGE (OUTPATIENT)
Dept: FAMILY MEDICINE | Facility: CLINIC | Age: 37
End: 2022-01-28
Payer: COMMERCIAL

## 2022-01-28 VITALS
OXYGEN SATURATION: 99 % | DIASTOLIC BLOOD PRESSURE: 70 MMHG | RESPIRATION RATE: 16 BRPM | SYSTOLIC BLOOD PRESSURE: 126 MMHG | WEIGHT: 261 LBS | BODY MASS INDEX: 39.34 KG/M2 | HEART RATE: 80 BPM | TEMPERATURE: 97.8 F

## 2022-01-28 DIAGNOSIS — J01.01 ACUTE RECURRENT MAXILLARY SINUSITIS: Primary | ICD-10-CM

## 2022-01-28 PROCEDURE — 99213 OFFICE O/P EST LOW 20 MIN: CPT | Performed by: PHYSICIAN ASSISTANT

## 2022-01-28 RX ORDER — PREDNISONE 20 MG/1
20 TABLET ORAL DAILY
Qty: 5 TABLET | Refills: 0 | Status: SHIPPED | OUTPATIENT
Start: 2022-01-28 | End: 2022-03-02

## 2022-01-28 ASSESSMENT — PAIN SCALES - GENERAL: PAINLEVEL: NO PAIN (0)

## 2022-01-28 NOTE — PATIENT INSTRUCTIONS
Will prescribe Augmentin to take twice daily for 10 days. Take a daily probiotic or Activia yogurt while you are on this.  Will also prescribe a steroid to take once daily for 5 days to help with the sinus congestion and ear pressure.  Drink plenty of fluids.  Can use an over the counter Nettipot or sinus rinse to help with nasal congestion. Mucinex can also be helpful.   Continue Flonase.  Follow up if symptoms are not improving

## 2022-01-28 NOTE — PROGRESS NOTES
Assessment & Plan       ICD-10-CM    1. Acute recurrent maxillary sinusitis  J01.01 amoxicillin-clavulanate (AUGMENTIN) 875-125 MG tablet     predniSONE (DELTASONE) 20 MG tablet       Will prescribe Augmentin to take twice daily for 10 days. Instructed to take a daily probiotic or Activia yogurt while on this.  Will also prescribe prednisone to take once daily for 5 days to help with the sinus congestion and ear pressure.  Drink plenty of fluids.  Can use an over the counter Nettipot or sinus rinse to help with nasal congestion. Mucinex can also be helpful.   Continue Flonase.  Follow up if symptoms are not improving      CATIA Velasquez [facility] OSCAR [place] is a 36 year old who presents for the following health issues     HPI     Acute Illness  Acute illness concerns: Sinus infections  Onset/Duration: december  Symptoms:  Fever: no  Chills/Sweats: no  Headache (location?): YES  Sinus Pressure: YES  Conjunctivitis:  no  Ear Pain: YES: bilateral  Rhinorrhea: YES  Congestion: YES  Sore Throat: no  Cough: no  Wheeze: no  Decreased Appetite: no  Nausea: no  Vomiting: no  Diarrhea: no  Dysuria/Freq.: no  Dysuria or Hematuria: no  Fatigue/Achiness: YES  Sick/Strep Exposure: no  Therapies tried and outcome: 2 rounds of antibiotics    She was initially treated right after [place] with 5 days of Keflex. Her symptoms recurred so she was recently given a 10 day course of Keflex by her PCP but symptoms never resolved and have been worse again the past few days with bilateral sinus pressure, R > L, right jaw/teeth pain, and ear pressure. No fevers or chills. She has been using Flonase along with intermittent Afrin nasal spray.     Review of Systems   Constitutional, HEENT, cardiovascular, pulmonary, gi and gu systems are negative, except as otherwise noted.      Objective    /70   Pulse 80   Temp 97.8  F (36.6  C) (Temporal)   Resp 16   Wt 118.4 kg (261 lb)   SpO2 99%    BMI 39.34 kg/m    Body mass index is 39.34 kg/m .  Physical Exam   GENERAL: healthy, alert and no distress  EYES: Eyes grossly normal to inspection, PERRL and conjunctivae and sclerae normal  HENT: ear canals normal, bilateral TMs with serous effusions. Nasal mucosa is erythematous and edematous bilaterally with right maxillary sinus tenderness.   NECK: no adenopathy, no asymmetry, masses, or scars and thyroid normal to palpation  RESP: lungs clear to auscultation - no rales, rhonchi or wheezes  CV: regular rate and rhythm, normal S1 S2, no S3 or S4, no murmur, click or rub, no peripheral edema and peripheral pulses strong

## 2022-01-28 NOTE — TELEPHONE ENCOUNTER
"Patient stated she has just finished her second round of antibiotics for a sinus infection and her symptoms have come back.  This RN huddled with provider in [place] due to unavailability of in clinic openings at this time to see if she would take an E-Visit for this patient.  LETY Calderon stated patient should be seen in clinic if the second round of medication did not work.  In [place], there was not a in office visit available at this time.  Phoned patient back and informed her of advise per provider, scheduled patient for opening in [place] with a provider later this afternoon.  Patient stated understanding.    Reason for Disposition    Taking antibiotic > 72 hours (3 days) and sinus pain not improved    Additional Information    Negative: Severe difficulty breathing (e.g., struggling for each breath, speaks in single words)    Negative: Sounds like a life-threatening emergency to the triager    Negative: Difficulty breathing and not from stuffy nose (e.g., not relieved by cleaning out the nose)    Negative: SEVERE headache and fever    Negative: Taking antibiotic > 24 hours and fever > 103 F (39.4 C)    Negative: Redness or swelling on the cheek, forehead or around the eye and fever    Negative: Patient sounds very sick or weak to the triager    Negative: SEVERE sinus pain and not improved 2 hours after pain medicine    Negative: Redness or swelling on the cheek, forehead or around the eye and new since starting antibiotics    Answer Assessment - Initial Assessment Questions  1. ANTIBIOTIC: \"What antibiotic are you receiving?\" \"How many times per day?\"      Keflex    2. ONSET: \"When was the antibiotic started?\"      2nd round of medication - 1/19/2022    3. PAIN: \"How bad is the sinus pain?\"   (Scale 1-10; mild, moderate or severe)    - MILD (1-3): doesn't interfere with normal activities     - MODERATE (4-7): interferes with normal activities (e.g., work or school) or awakens from sleep    - " "SEVERE (8-10): excruciating pain and patient unable to do any normal activities         5/10    4. FEVER: \"Do you have a fever?\" If so, ask: \"What is it, how was it measured, and when did it start?\"       No    5. SYMPTOMS: \"Are there any other symptoms you're concerned about?\" If so, ask: \"When did it start?\"      Headache- right side , throat pain, ear pain - right side.  COVID 19 negative    6. PREGNANCY: \"Is there any chance you are pregnant?\" \"When was your last menstrual period?\"      No    Protocols used: SINUS INFECTION ON ANTIBIOTIC FOLLOW-UP CALL-A-[place]  Prabha John RN      "

## 2022-03-01 ASSESSMENT — ENCOUNTER SYMPTOMS
HEADACHES: 1
HEMATOCHEZIA: 0
ARTHRALGIAS: 1
COUGH: 0
JOINT SWELLING: 1
CHILLS: 0
CONSTIPATION: 0
BREAST MASS: 0
HEMATURIA: 0
ABDOMINAL PAIN: 0
DIZZINESS: 0
PALPITATIONS: 0
NAUSEA: 0
DIARRHEA: 0
FEVER: 0
FREQUENCY: 0
EYE PAIN: 0
SORE THROAT: 0
PARESTHESIAS: 0
NERVOUS/ANXIOUS: 1
SHORTNESS OF BREATH: 0
HEARTBURN: 0
WEAKNESS: 0
MYALGIAS: 1
DYSURIA: 0

## 2022-03-01 ASSESSMENT — PATIENT HEALTH QUESTIONNAIRE - PHQ9
10. IF YOU CHECKED OFF ANY PROBLEMS, HOW DIFFICULT HAVE THESE PROBLEMS MADE IT FOR YOU TO DO YOUR WORK, TAKE CARE OF THINGS AT HOME, OR GET ALONG WITH OTHER PEOPLE: SOMEWHAT DIFFICULT
SUM OF ALL RESPONSES TO PHQ QUESTIONS 1-9: 16
SUM OF ALL RESPONSES TO PHQ QUESTIONS 1-9: 16

## 2022-03-01 ASSESSMENT — ANXIETY QUESTIONNAIRES
2. NOT BEING ABLE TO STOP OR CONTROL WORRYING: NEARLY EVERY DAY
1. FEELING NERVOUS, ANXIOUS, OR ON EDGE: NEARLY EVERY DAY
4. TROUBLE RELAXING: NEARLY EVERY DAY
5. BEING SO RESTLESS THAT IT IS HARD TO SIT STILL: SEVERAL DAYS
6. BECOMING EASILY ANNOYED OR IRRITABLE: MORE THAN HALF THE DAYS
GAD7 TOTAL SCORE: 17
7. FEELING AFRAID AS IF SOMETHING AWFUL MIGHT HAPPEN: MORE THAN HALF THE DAYS
GAD7 TOTAL SCORE: 17
GAD7 TOTAL SCORE: 17
3. WORRYING TOO MUCH ABOUT DIFFERENT THINGS: NEARLY EVERY DAY
7. FEELING AFRAID AS IF SOMETHING AWFUL MIGHT HAPPEN: MORE THAN HALF THE DAYS

## 2022-03-02 ENCOUNTER — OFFICE VISIT (OUTPATIENT)
Dept: FAMILY MEDICINE | Facility: CLINIC | Age: 37
End: 2022-03-02
Payer: COMMERCIAL

## 2022-03-02 VITALS
BODY MASS INDEX: 38.73 KG/M2 | SYSTOLIC BLOOD PRESSURE: 122 MMHG | RESPIRATION RATE: 18 BRPM | HEART RATE: 106 BPM | OXYGEN SATURATION: 98 % | HEIGHT: 69 IN | TEMPERATURE: 97.2 F | WEIGHT: 261.5 LBS | DIASTOLIC BLOOD PRESSURE: 70 MMHG

## 2022-03-02 DIAGNOSIS — Z13.1 SCREENING FOR DIABETES MELLITUS: ICD-10-CM

## 2022-03-02 DIAGNOSIS — F32.1 MODERATE MAJOR DEPRESSION (H): ICD-10-CM

## 2022-03-02 DIAGNOSIS — R09.81 NASAL SINUS CONGESTION: ICD-10-CM

## 2022-03-02 DIAGNOSIS — Z23 HIGH PRIORITY FOR 2019-NCOV VACCINE: ICD-10-CM

## 2022-03-02 DIAGNOSIS — Z30.430 ENCOUNTER FOR INSERTION OF INTRAUTERINE CONTRACEPTIVE DEVICE: ICD-10-CM

## 2022-03-02 DIAGNOSIS — R51.9 SINUS HEADACHE: ICD-10-CM

## 2022-03-02 DIAGNOSIS — Z00.00 ROUTINE GENERAL MEDICAL EXAMINATION AT A HEALTH CARE FACILITY: Primary | ICD-10-CM

## 2022-03-02 DIAGNOSIS — F41.1 GAD (GENERALIZED ANXIETY DISORDER): ICD-10-CM

## 2022-03-02 DIAGNOSIS — Z12.4 CERVICAL CANCER SCREENING: ICD-10-CM

## 2022-03-02 DIAGNOSIS — Z11.59 NEED FOR HEPATITIS C SCREENING TEST: ICD-10-CM

## 2022-03-02 DIAGNOSIS — Z30.46 ENCOUNTER FOR NEXPLANON REMOVAL: ICD-10-CM

## 2022-03-02 PROBLEM — Z30.017 INSERTION OF IMPLANTABLE SUBDERMAL CONTRACEPTIVE: Status: RESOLVED | Noted: 2021-02-09 | Resolved: 2022-03-02

## 2022-03-02 LAB
FASTING STATUS PATIENT QL REPORTED: YES
GLUCOSE BLD-MCNC: 105 MG/DL (ref 70–99)
HCV AB SERPL QL IA: NONREACTIVE

## 2022-03-02 PROCEDURE — 87624 HPV HI-RISK TYP POOLED RSLT: CPT | Performed by: FAMILY MEDICINE

## 2022-03-02 PROCEDURE — 58300 INSERT INTRAUTERINE DEVICE: CPT | Performed by: FAMILY MEDICINE

## 2022-03-02 PROCEDURE — 99214 OFFICE O/P EST MOD 30 MIN: CPT | Mod: 25 | Performed by: FAMILY MEDICINE

## 2022-03-02 PROCEDURE — 99395 PREV VISIT EST AGE 18-39: CPT | Mod: 25 | Performed by: FAMILY MEDICINE

## 2022-03-02 PROCEDURE — 86803 HEPATITIS C AB TEST: CPT | Performed by: FAMILY MEDICINE

## 2022-03-02 PROCEDURE — 36415 COLL VENOUS BLD VENIPUNCTURE: CPT | Performed by: FAMILY MEDICINE

## 2022-03-02 PROCEDURE — 82947 ASSAY GLUCOSE BLOOD QUANT: CPT | Performed by: FAMILY MEDICINE

## 2022-03-02 PROCEDURE — G0145 SCR C/V CYTO,THINLAYER,RESCR: HCPCS | Performed by: FAMILY MEDICINE

## 2022-03-02 ASSESSMENT — ENCOUNTER SYMPTOMS
BREAST MASS: 0
DYSURIA: 0
PARESTHESIAS: 0
COUGH: 0
ABDOMINAL PAIN: 0
WEAKNESS: 0
DIZZINESS: 0
HEADACHES: 1
NAUSEA: 0
SORE THROAT: 0
JOINT SWELLING: 1
CONSTIPATION: 0
HEMATURIA: 0
CHILLS: 0
MYALGIAS: 0
SINUS PAIN: 0
HEMATOCHEZIA: 0
ARTHRALGIAS: 1
EYE PAIN: 0
FEVER: 0
NERVOUS/ANXIOUS: 1
TROUBLE SWALLOWING: 0
PALPITATIONS: 0
EYE ITCHING: 0
HEARTBURN: 0
FREQUENCY: 0
SINUS PRESSURE: 1
EYE REDNESS: 0
SHORTNESS OF BREATH: 0
DIARRHEA: 0

## 2022-03-02 ASSESSMENT — ANXIETY QUESTIONNAIRES: GAD7 TOTAL SCORE: 17

## 2022-03-02 ASSESSMENT — PATIENT HEALTH QUESTIONNAIRE - PHQ9: SUM OF ALL RESPONSES TO PHQ QUESTIONS 1-9: 16

## 2022-03-02 ASSESSMENT — PAIN SCALES - GENERAL: PAINLEVEL: NO PAIN (0)

## 2022-03-02 NOTE — PROGRESS NOTES
SUBJECTIVE:   CC: Leah is an 36 year old woman with a history of anxiety and depression who presents for preventive health visit. At today's visit, Leah would like to discuss other options to manage the continuous sinus pressure she has been experiencing as well as remove her Nexplanon and have an IUD inserted. She reports that she has been having frequent spotting since the Nexplanon was put in a year ago.  Leah reports that she has been experiencing sinus pressure and congestion since December 26th 2021 and after going through a few rounds of antibiotics and a steroid, it has not improved. She states that her grandfather had some nasal polyps and she is wondering if she might have them too, which could be contributing to her symptoms.      Patient has been advised of split billing requirements and indicates understanding: Yes  Healthy Habits:     Getting at least 3 servings of Calcium per day:  Yes    Bi-annual eye exam:  NO    Dental care twice a year:  Yes    Sleep apnea or symptoms of sleep apnea:  None    Diet:  Gluten-free/reduced    Frequency of exercise:  None    Taking medications regularly:  Yes    Medication side effects:  None    PHQ-2 Total Score: 4    Additional concerns today:  No      Today's PHQ-2 Score:   PHQ-2 ( 1999 [employer]) 3/1/2022   Q1: Little interest or pleasure in doing things 2   Q2: Feeling down, depressed or hopeless 2   PHQ-2 Score 4   PHQ-2 Total Score (12-17 Years)- Positive if 3 or more points; Administer PHQ-A if positive -   Q1: Little interest or pleasure in doing things More than half the days   Q2: Feeling down, depressed or hopeless More than half the days   PHQ-2 Score 4       Abuse: Current or Past (Physical, Sexual or Emotional) - No  Do you feel safe in your environment? Yes    Have you ever done Advance Care Planning? (For example, a Health Directive, POLST, or a discussion with a medical provider or your loved ones about your wishes): No, advance care planning  information given to patient to review.  Patient declined advance care planning discussion at this time.    Social History     Tobacco Use     Smoking status: Never Smoker     Smokeless tobacco: Never Used   Substance Use Topics     Alcohol use: No     If you drink alcohol do you typically have >3 drinks per day or >7 drinks per week? No    Alcohol Use 3/1/2022   Prescreen: >3 drinks/day or >7 drinks/week? No   Prescreen: >3 drinks/day or >7 drinks/week? -       Reviewed orders with patient.  Reviewed health maintenance and updated orders accordingly - Yes  Lab work is in process  Labs reviewed in EPIC  BP Readings from Last 3 Encounters:   03/02/22 122/70   01/28/22 126/70   09/07/21 116/70    Wt Readings from Last 3 Encounters:   03/02/22 118.6 kg (261 lb 8 oz)   01/28/22 118.4 kg (261 lb)   09/07/21 101.6 kg (224 lb)                  Patient Active Problem List   Diagnosis     Gluten intolerance     Obesity (BMI 35.0-39.9 without comorbidity)     Seasonal allergic rhinitis, unspecified chronicity, unspecified trigger     Sprain of neck     Segmental dysfunction of cervical region     Segmental dysfunction of thoracic region     Tension headache     Segmental dysfunction of sacral region     Segmental dysfunction of lumbar region     Lumbago     HOLLY (generalized anxiety disorder)     Moderate major depression (H)     Injury of jaw, initial encounter (WC)     Mirena placed 3/2/2022     Past Surgical History:   Procedure Laterality Date     LAPAROSCOPIC SALPINGECTOMY Bilateral 8/12/2019    Procedure: SALPINGECTOMY, LAPAROSCOPIC BILATERAL;  Surgeon: Dwayne Campos MD;  Location: [facility]     NO HISTORY OF SURGERY         Social History     Tobacco Use     Smoking status: Never Smoker     Smokeless tobacco: Never Used   Substance Use Topics     Alcohol use: Yes     Comment: socially- very minimal     Family History   Problem Relation Age of Onset     Arthritis Mother      No Known Problems Father      No Known  Problems Maternal Grandmother      Diabetes Maternal Grandfather         Type II     Hyperlipidemia Paternal Grandmother      No Known Problems Paternal Grandfather      Depression Brother         6 yrs younger     Anxiety Disorder Brother         on meds     No Known Problems Son      No Known Problems Son          Current Outpatient Medications   Medication Sig Dispense Refill     fluticasone (FLONASE) 50 MCG/ACT nasal spray Spray 2 sprays into both nostrils daily 16 g 3     ibuprofen (ADVIL/MOTRIN) 200 MG capsule Take 200 mg by mouth every 4 hours as needed for fever       levonorgestrel (MIRENA) 20 MCG/24HR IUD 1 each (20 mcg) by Intrauterine route once       sertraline (ZOLOFT) 50 MG tablet 1/2 tablet (25 mg) nightly for 2 wks then increase to a full tablet (50 mg) nightly 60 tablet 1     No Known Allergies  Recent Labs   Lab Test 09/19/20  0938 01/31/20  1329 09/17/19  0925 09/24/18  1809   LDL 81  --   --   --    HDL 56  --   --   --    TRIG 94  --   --   --    ALT  --  39  --   --    CR  --  0.74  --   --    GFRESTIMATED  --  >90  --   --    GFRESTBLACK  --  >90  --   --    POTASSIUM  --  3.8  --   --    TSH  --   --  1.28 1.25        Breast Cancer Screening:    Breast CA Risk Assessment (FHS-7) 3/1/2022   Do you have a family history of breast, colon, or ovarian cancer? No / Unknown         Patient under 40 years of age: Routine Mammogram Screening not recommended.   Pertinent mammograms are reviewed under the imaging tab.    History of abnormal Pap smear: NO - age 30-65 PAP every 5 years with negative HPV co-testing recommended  PAP / HPV Latest Ref Rng & Units 6/21/2017 9/29/2014   PAP (Historical) - NIL NIL   HPV16 NEG Negative -   HPV18 NEG Negative -   HRHPV NEG Negative -     Reviewed and updated as needed this visit by clinical staff   Tobacco   Meds      Soc Hx        Reviewed and updated as needed this visit by Provider                 Past Medical History:   Diagnosis Date     Closed fracture of  left ankle with delayed healing 2021     Dislocation of symphysis pubis, initial encounter 2017     Mirena placed 3/2/2022 2021     Nexplanon placed 2021     Nexplanon removed 3/1/2022 2021     NO ACTIVE PROBLEMS (aka NONE)       Past Surgical History:   Procedure Laterality Date     LAPAROSCOPIC SALPINGECTOMY Bilateral 2019    Procedure: SALPINGECTOMY, LAPAROSCOPIC BILATERAL;  Surgeon: Dwayne Campos MD;  Location: [facility]     NO HISTORY OF SURGERY       OB History    Para Term  AB Living   3 3 3 0 0 2   SAB IAB Ectopic Multiple Live Births   0 0 0 0 2      # Outcome Date GA Lbr Marlo/2nd Weight Sex Delivery Anes PTL Lv   3 Term 19 39w6d 03:15 / 00:11 3.11 kg (6 lb 13.7 oz) F Vag-Spont EPI N JESSIE      Name: PEYTON,FEMALE-ELIDA      Apgar1: 9  Apgar5: 9   2 Term 17 40w4d 06:00 / 00:44 3.147 kg (6 lb 15 oz) M Vag-Spont EPI N JESSIE      Name: Kenton      Apgar1: 9  Apgar5: 9   1 Term 05/08/15 40w0d  3.317 kg (7 lb 5 oz) M   N       Name: Suzetteesterdennis      Obstetric Comments   JACKELIN:5/7/15 by 8 4/7 week ultrasound    to Demetrio.  This will be their first delivery at [facility].       Review of Systems   Constitutional: Negative for chills and fever.   HENT: Positive for congestion and sinus pressure. Negative for ear pain, hearing loss, nosebleeds, sinus pain, sore throat and trouble swallowing.    Eyes: Negative for pain, redness, itching and visual disturbance.   Respiratory: Negative for cough and shortness of breath.    Cardiovascular: Negative for chest pain, palpitations and peripheral edema.   Gastrointestinal: Negative for abdominal pain, constipation, diarrhea, heartburn, hematochezia and nausea.   Breasts:  Negative for tenderness, breast mass and discharge.   Genitourinary: Negative for dysuria, frequency, genital sores, hematuria, pelvic pain, urgency, vaginal bleeding and vaginal discharge.   Musculoskeletal: Positive for arthralgias and joint  "swelling. Negative for myalgias.        Patient reports left ankle fracture over a year ago.    Skin: Negative for rash.   Neurological: Positive for headaches. Negative for dizziness, weakness and paresthesias.   Psychiatric/Behavioral: Positive for mood changes. The patient is nervous/anxious.           OBJECTIVE:   /70   Pulse 106   Temp 97.2  F (36.2  C) (Temporal)   Resp 18   Ht 1.742 m (5' 8.6\")   Wt 118.6 kg (261 lb 8 oz)   LMP 02/27/2022   SpO2 98%   Breastfeeding No   BMI 39.07 kg/m    Physical Exam  Exam conducted with a chaperone present.   Constitutional:       General: She is not in acute distress.     Appearance: She is obese. She is not toxic-appearing or diaphoretic.   HENT:      Head: Normocephalic and atraumatic.      Right Ear: Tympanic membrane and ear canal normal. There is no impacted cerumen.      Left Ear: Tympanic membrane and ear canal normal. There is no impacted cerumen.      Nose: Congestion present.      Comments: Patient reports that congestion has been present since December 2021.     Mouth/Throat:      Mouth: Mucous membranes are moist.      Pharynx: No oropharyngeal exudate or posterior oropharyngeal erythema.   Eyes:      General:         Right eye: No discharge.         Left eye: No discharge.      Extraocular Movements: Extraocular movements intact.   Cardiovascular:      Rate and Rhythm: Normal rate and regular rhythm.      Pulses: Normal pulses.      Heart sounds: Normal heart sounds. No murmur heard.    No friction rub. No gallop.   Pulmonary:      Effort: Pulmonary effort is normal. No respiratory distress.      Breath sounds: Normal breath sounds. No stridor. No rhonchi.   Abdominal:      General: Bowel sounds are normal. There is no distension.      Palpations: Abdomen is soft.      Tenderness: There is no abdominal tenderness.   Genitourinary:     General: Normal vulva.      Exam position: Lithotomy position.      Pubic Area: No rash or pubic lice.       " Labia:         Right: No rash, tenderness or lesion.         Left: No rash, tenderness or lesion.       Urethra: No urethral swelling or urethral lesion.      Vagina: No foreign body.      Cervix: No friability.   Musculoskeletal:         General: No swelling, tenderness or deformity.      Cervical back: Normal range of motion and neck supple. No rigidity or tenderness.      Right lower leg: No edema.      Left lower leg: No edema.   Skin:     General: Skin is warm and dry.      Coloration: Skin is not jaundiced or pale.      Findings: No bruising, erythema or rash.   Neurological:      General: No focal deficit present.      Mental Status: She is alert and oriented to person, place, and time.      Cranial Nerves: No cranial nerve deficit.      Motor: No weakness.      Gait: Gait normal.   Psychiatric:         Behavior: Behavior normal.         Thought Content: Thought content normal.         Judgment: Judgment normal.           Diagnostic Test Results:  Labs reviewed in Epic  Results for orders placed or performed in visit on 03/02/22 (from the past 24 hour(s))   Glucose   Result Value Ref Range    Glucose 105 (H) 70 - 99 mg/dL    Patient Fasting > 8hrs? Yes        ASSESSMENT/PLAN:   (Z00.00) Routine general medical examination at a health care facility  (primary encounter diagnosis)  Comment: has been struggling more recently with the depression anxiety. Patient has tried Sertraline in the past and would like to try it again.   Plan: Sertraline reordered    (Z11.59) Need for hepatitis C screening test  Comment:   Plan: Hepatitis C Screen Reflex to HCV RNA Quant and         Genotype ordered            (Z12.4) Cervical cancer screening  Comment:   Plan: Pap Screen with HPV - recommended age 30 - 65         Years. Swab done    (Z13.1) Screening for diabetes mellitus  Comment:  Plan: Glucose ordered    (F41.1) HOLLY (generalized anxiety disorder)  Comment: Patient reports that her anxiety has been difficult to manage  lately. She was on Zoloft in the past and would like to try that again.  Plan: sertraline (ZOLOFT) 50 MG tablet reordered    (F32.1) Moderate major depression (H)  Comment:  Patient reports that her depression has been difficult to manage lately too. She was on Zoloft in the past and would like to try that again.  Plan: sertraline (ZOLOFT) 50 MG tablet reordered    (R09.81) Nasal sinus congestion  Comment: Patient reports that she has been experiencing some sinus pressure and congestion since December 26th 2021. Antibiotics and steroid treatments have not been helpful thus far. She is wondering if she might have polyps like her grandfather, which might be contributing to her symptoms   Plan: CT Sinus w/o Contrast ordered. If results of the CT positive for positive for polyps, referral to ENT will be made.     (R51.9) Sinus headache  Comment: Patient reports that she frequently has sinus pressure, congestion and headaches and wondering if nasal polyps might be contributing to these.   Plan: CT Sinus w/o Contrast. If results of the CT positive for positive for polyps, referral to ENT will be made.            (Z30.46) Encounter for Nexplanon removal  Comment: Patient reports frequent spotting since Nexplanon placement a year ago. Patient would like it removed.   Plan: Nexplanon removed without complications    (Z30.430) Encounter for insertion of intrauterine contraceptive device  Comment: Patient interested in having an IUD inserted. She reports that she has some friends who have an IUD and they advise her to get one instead of the Nexplanon to help with her heavy bleeding and spotting.   Plan: levonorgestrel (MIRENA) 20 MCG/24HR IUD,         levonorgestrel (MIRENA) 20 MCG/24HR IUD 20 mcg,        INSERTION INTRAUTERINE DEVICE  Mirena IUD inserted without complications      Patient has been advised of split billing requirements and indicates understanding: Yes    COUNSELING:  Reviewed preventive health counseling, as  "reflected in patient instructions       Regular exercise       Healthy diet/nutrition    Estimated body mass index is 39.07 kg/m  as calculated from the following:    Height as of this encounter: 1.742 m (5' 8.6\").    Weight as of this encounter: 118.6 kg (261 lb 8 oz).    Weight management plan: not discussed today    She reports that she has never smoked. She has never used smokeless tobacco.      Counseling Resources:  ATP IV Guidelines  Pooled Cohorts Equation Calculator  Breast Cancer Risk Calculator  BRCA-Related Cancer Risk Assessment: FHS-7 Tool  FRAX Risk Assessment  ICSI Preventive Guidelines  Dietary Guidelines for Americans, 2010  PPS's MyPlate  ASA Prophylaxis  Lung CA Screening      Jacinta Mc, JARETH/FNP Student    Dwayne Campos MD, MD  [facility]  Answers for HPI/ROS submitted by the patient on 3/1/2022  If you checked off any problems, how difficult have these problems made it for you to do your work, take care of things at home, or get along with other people?: Somewhat difficult  PHQ9 TOTAL SCORE: 16  HOLLY 7 TOTAL SCORE: 17      "

## 2022-03-04 LAB
BKR LAB AP GYN ADEQUACY: NORMAL
BKR LAB AP GYN INTERPRETATION: NORMAL
BKR LAB AP HPV REFLEX: NORMAL
BKR LAB AP LMP: NORMAL
BKR LAB AP PREVIOUS ABNORMAL: NORMAL
PATH REPORT.COMMENTS IMP SPEC: NORMAL
PATH REPORT.COMMENTS IMP SPEC: NORMAL
PATH REPORT.RELEVANT HX SPEC: NORMAL

## 2022-03-07 LAB
HUMAN PAPILLOMA VIRUS 16 DNA: NEGATIVE
HUMAN PAPILLOMA VIRUS 18 DNA: NEGATIVE
HUMAN PAPILLOMA VIRUS FINAL DIAGNOSIS: NORMAL
HUMAN PAPILLOMA VIRUS OTHER HR: NEGATIVE

## 2022-03-28 ENCOUNTER — HOSPITAL ENCOUNTER (OUTPATIENT)
Dept: CT IMAGING | Facility: CLINIC | Age: 37
Discharge: HOME | End: 2022-03-28
Attending: FAMILY MEDICINE | Admitting: FAMILY MEDICINE
Payer: COMMERCIAL

## 2022-03-28 DIAGNOSIS — R09.81 NASAL SINUS CONGESTION: ICD-10-CM

## 2022-03-28 DIAGNOSIS — R51.9 SINUS HEADACHE: ICD-10-CM

## 2022-03-28 PROCEDURE — 70486 CT MAXILLOFACIAL W/O DYE: CPT

## 2022-04-14 NOTE — PROGRESS NOTES
Chief Complaint   Patient presents with     Ear Problem     left side x 3 days now moving down to jaw     Cough     SUBJECTIVE:  Leah Casarez is a 34 year old female who presents for evaluation of left earache, jaw pain, lymphadenopathy, chills, headache, clear wet fluid in left canal, sinus congestion, post nasal drip, occasional tickly cough for 3 days.  Severity: moderate   Timing: gradual onset and worsening  Treatment measures tried include: Tylenol/Ibuprofen.  History of recurrent otitis: no  Predisposing factors include: colds in the house.    Past Medical History:   Diagnosis Date     Dislocation of symphysis pubis, initial encounter 11/1/2017     NO ACTIVE PROBLEMS (aka NONE)      ibuprofen (ADVIL/MOTRIN) 200 MG capsule, Take 200 mg by mouth every 4 hours as needed for fever  sertraline (ZOLOFT) 50 MG tablet, Take 1 tablet (50 mg) by mouth daily    No current facility-administered medications on file prior to visit.     Social History     Tobacco Use     Smoking status: Never Smoker     Smokeless tobacco: Never Used   Substance Use Topics     Alcohol use: No     No Known Allergies    Review of Systems   Constitutional: Negative for activity change, chills, diaphoresis, fatigue and fever.   HENT: Positive for congestion, ear discharge, ear pain, postnasal drip, sinus pressure, sinus pain and sore throat (scratchy). Negative for hearing loss and tinnitus.    Respiratory: Positive for cough. Negative for chest tightness, shortness of breath and wheezing.    Allergic/Immunologic: Negative for environmental allergies.   Neurological: Positive for headaches.   Hematological: Positive for adenopathy.   Psychiatric/Behavioral: Negative for sleep disturbance.     OBJECTIVE:  /73   Pulse 65   Temp 98.2  F (36.8  C) (Oral)   SpO2 99%      Physical Exam  Vitals signs reviewed.   Constitutional:       General: She is not in acute distress.     Appearance: Normal appearance. She is not ill-appearing,  toxic-appearing or diaphoretic.   HENT:      Head: Normocephalic and atraumatic.      Right Ear: Ear canal normal.      Ears:      Comments: Clear serous effusion of right tympanic membrane. Left external auditory canal with erythema, edema, maceration, watery clear drainage, unable to fully visualize tympanic membrane. Tragus tenderness and preauricular lymphadenopathy.     Nose: Congestion present.      Comments: More so congested versus pain. She thinks the earache is radiating pain to face.     Mouth/Throat:      Pharynx: Posterior oropharyngeal erythema (slight) present. No oropharyngeal exudate.   Neck:      Musculoskeletal: Normal range of motion and neck supple.   Cardiovascular:      Rate and Rhythm: Normal rate.   Pulmonary:      Effort: Pulmonary effort is normal. No respiratory distress (occasional cough from postnasal drip).      Breath sounds: Normal breath sounds. No stridor. No wheezing, rhonchi or rales.   Chest:      Chest wall: No tenderness.   Lymphadenopathy:      Cervical: Cervical adenopathy present.   Skin:     General: Skin is warm and dry.   Neurological:      General: No focal deficit present.      Mental Status: She is alert and oriented to person, place, and time.   Psychiatric:         Mood and Affect: Mood normal.         Behavior: Behavior normal.       ASSESSMENT:    ICD-10-CM    1. Non-recurrent acute suppurative otitis media of left ear with spontaneous rupture of tympanic membrane H66.012 amoxicillin-clavulanate (AUGMENTIN) 875-125 MG tablet   2. Acute otitis externa of left ear, unspecified type H60.502 ciprofloxacin-dexamethasone (CIPRODEX) 0.3-0.1 % otic suspension     PLAN:   Patient Instructions   Ciprodex and Augmentin for left ear infection with possible perforation.  Tylenol and/or motrin for pain relief and fever reduction.  Warm compresses next to ear for pain relief.  Drink plenty of fluids and place a humidifier in bedroom.  Mucinex to help reduce fluid in ears  (guiafenasin is the generic).  Ear infections are not contagious.  Keep ear canal dry for one week.  Zyrtec and benadryl for fluid and pressure.  Follow up with primary care provider 10-14 days after starting the antibiotic with any concern of persistent infection.    Follow up with primary care provider with any problems, questions or concerns or if symptoms worsen or fail to improve. Patient agreed to plan and verbalized understanding.    AGNES Garza  [facility]   Stable.

## 2022-04-25 ENCOUNTER — MYC MEDICAL ADVICE (OUTPATIENT)
Dept: FAMILY MEDICINE | Facility: CLINIC | Age: 37
End: 2022-04-25
Payer: COMMERCIAL

## 2022-04-25 ENCOUNTER — TRANSFERRED RECORDS (OUTPATIENT)
Dept: HEALTH INFORMATION MANAGEMENT | Facility: CLINIC | Age: 37
End: 2022-04-25
Payer: COMMERCIAL

## 2022-05-10 ENCOUNTER — OFFICE VISIT (OUTPATIENT)
Dept: FAMILY MEDICINE | Facility: CLINIC | Age: 37
End: 2022-05-10
Payer: COMMERCIAL

## 2022-05-10 VITALS
WEIGHT: 267 LBS | DIASTOLIC BLOOD PRESSURE: 70 MMHG | HEIGHT: 69 IN | BODY MASS INDEX: 39.55 KG/M2 | TEMPERATURE: 97.2 F | OXYGEN SATURATION: 98 % | RESPIRATION RATE: 12 BRPM | SYSTOLIC BLOOD PRESSURE: 130 MMHG | HEART RATE: 97 BPM

## 2022-05-10 DIAGNOSIS — S82.892G CLOSED FRACTURE OF LEFT ANKLE WITH DELAYED HEALING: ICD-10-CM

## 2022-05-10 DIAGNOSIS — Z01.818 PREOP GENERAL PHYSICAL EXAM: Primary | ICD-10-CM

## 2022-05-10 PROCEDURE — 99214 OFFICE O/P EST MOD 30 MIN: CPT | Performed by: FAMILY MEDICINE

## 2022-05-10 RX ORDER — LORATADINE 10 MG/1
10 TABLET ORAL DAILY
COMMUNITY

## 2022-05-10 RX ORDER — GUAIFENESIN AND DEXTROMETHORPHAN HYDROBROMIDE 600; 30 MG/1; MG/1
1 TABLET, EXTENDED RELEASE ORAL EVERY 12 HOURS
COMMUNITY

## 2022-05-10 ASSESSMENT — PAIN SCALES - GENERAL: PAINLEVEL: MODERATE PAIN (5)

## 2022-05-10 NOTE — PATIENT INSTRUCTIONS
Preparing for Your Surgery  Getting started  A nurse will call you to review your health history and instructions. They will give you an arrival time based on your scheduled surgery time. Please be ready to share:    Your doctor's clinic name and phone number    Your medical, surgical and anesthesia history    A list of allergies and sensitivities    A list of medicines, including herbal treatments and over-the-counter drugs    Whether the patient has a legal guardian (ask how to send us the papers in advance)  Please tell us if you're pregnant--or if there's any chance you might be pregnant. Some surgeries may injure a fetus (unborn baby), so they require a pregnancy test. Surgeries that are safe for a fetus don't always need a test, and you can choose whether to have one.   If you have a child who's having surgery, please ask for a copy of Preparing for Your Child's Surgery.    Preparing for surgery    Within 30 days of surgery: Have a pre-op exam (sometimes called an H&P, or History and Physical). This can be done at a clinic or pre-operative center.  ? If you're having a , you may not need this exam. Talk to your care team.    At your pre-op exam, talk to your care team about all medicines you take. If you need to stop any medicines before surgery, ask when to start taking them again.  ? We do this for your safety. Many medicines can make you bleed too much during surgery. Some change how well surgery (anesthesia) drugs work.    Call your insurance company to let them know you're having surgery. (If you don't have insurance, call [phone].)    Call your clinic if there's any change in your health. This includes signs of a cold or flu (sore throat, runny nose, cough, rash, fever). It also includes a scrape or scratch near the surgery site.    If you have questions on the day of surgery, call your hospital or surgery center.  COVID testing  You may need to be tested for COVID-19 before having  surgery. If so, we will give you instructions.  Eating and drinking guidelines  For your safety: Unless your surgeon tells you otherwise, follow the guidelines below.    Eat and drink as usual until 8 hours before surgery. After that, no food or milk.    Drink clear liquids until 2 hours before surgery. These are liquids you can see through, like water, Gatorade and Propel Water. You may also have black coffee and tea (no cream or milk).    Nothing by mouth within 2 hours of surgery. This includes gum, candy and breath mints.    If you drink alcohol: Stop drinking it the night before surgery.    If your care team tells you to take medicine on the morning of surgery, it's okay to take it with a sip of water.  Preventing infection    Shower or bathe the night before and morning of your surgery. Follow the instructions your clinic gave you. (If no instructions, use regular soap.)    Don't shave or clip hair near your surgery site. We'll remove the hair if needed.    Don't smoke or vape the morning of surgery. You may chew nicotine gum up to 2 hours before surgery. A nicotine patch is okay.  ? Note: Some surgeries require you to completely quit smoking and nicotine. Check with your surgeon.    Your care team will make every effort to keep you safe from infection. We will:  ? Clean our hands often with soap and water (or an alcohol-based hand rub).  ? Clean the skin at your surgery site with a special soap that kills germs.  ? Give you a special gown to keep you warm. (Cold raises the risk of infection.)  ? Wear special hair covers, masks, gowns and gloves during surgery.  ? Give antibiotic medicine, if prescribed. Not all surgeries need antibiotics.  What to bring on the day of surgery    Photo ID and insurance card    Copy of your health care directive, if you have one    Glasses and hearing aides (bring cases)  ? You can't wear contacts during surgery    Inhaler and eye drops, if you use them (tell us about these when  you arrive)    CPAP machine or breathing device, if you use them    A few personal items, if spending the night    If you have . . .  ? A pacemaker, ICD (cardiac defibrillator) or other implant: Bring the ID card.  ? An implanted stimulator: Bring the remote control.  ? A legal guardian: Bring a copy of the certified (court-stamped) guardianship papers.  Please remove any jewelry, including body piercings. Leave jewelry and other valuables at home.  If you're going home the day of surgery    You must have a responsible adult drive you home. They should stay with you overnight as well.    If you don't have someone to stay with you, and you aren't safe to go home alone, we may keep you overnight. Insurance often won't pay for this.  After surgery  If it's hard to control your pain or you need more pain medicine, please call your surgeon's office.  Questions?   If you have any questions for your care team, list them here: _________________________________________________________________________________________________________________________________________________________________________ ____________________________________ ____________________________________ ____________________________________  For informational purposes only. Not to replace the advice of your health care provider. Copyright   2003, 2019 [facility]. All rights reserved. Clinically reviewed by Radha Henao MD. [employer] [id number] - REV 07/21.

## 2022-05-10 NOTE — PROGRESS NOTES
[address] [address]  Phone: [phone]  Fax: [phone]  Primary Provider: Jacques Garcia  Pre-op Performing Provider: JACQUES GARCIA      PREOPERATIVE EVALUATION:  Today's date: 5/10/2022    Leah Casarez is a 37 year old female who presents for a preoperative evaluation.    Surgical Information:  Surgery/Procedure: left ankle arthroscopy with possible lateral ligament reconstruction.   Surgery Location: [facility] [place]  Surgeon: Dr. Turner  Surgery Date: 05/23/2022  Time of Surgery: TBD  Where patient plans to recover: At home with family  Fax number for surgical facility: [phone]    Type of Anesthesia Anticipated: General and with a popliteal block    Assessment & Plan     The proposed surgical procedure is considered LOW risk.    (Z01.818) Preop general physical exam  (primary encounter diagnosis)  (S82.892G) Closed fracture of left ankle with delayed healing  Comment: Non-healing injury, now needing surgical intervention.  Plan: arthroscopic surgery with Dr. Turner with possible ligament reconstruction.          Risks and Recommendations:  The patient has the following additional risks and recommendations for perioperative complications:   - No identified additional risk factors other than previously addressed    Medication Instructions:  Patient will hold her allergy meds the morning of surgery.    RECOMMENDATION:  APPROVAL GIVEN to proceed with proposed procedure, without further diagnostic evaluation.    Patient is a candidate for regional anesthesia with sedation.  She always has a rough time coming out of anesthesia with nausea and vomiting.      Review of external notes as documented above   Reviewed [facility] documents from Dr. Turner.    30 minutes spent on the date of the encounter doing chart review, history and exam, documentation and further activities per the note        Subjective     HPI related to  upcoming procedure: injured her ankle in April 25, 2021    Preop Questions 5/9/2022   1. Have you ever had a heart attack or stroke? No   2. Have you ever had surgery on your heart or blood vessels, such as a stent placement, a coronary artery bypass, or surgery on an artery in your head, neck, heart, or legs? No   3. Do you have chest pain with activity? No   4. Do you have a history of  heart failure? No   5. Do you currently have a cold, bronchitis or symptoms of other infection? No   6. Do you have a cough, shortness of breath, or wheezing? No   7. Do you or anyone in your family have previous history of blood clots? No   8. Do you or does anyone in your family have a serious bleeding problem such as prolonged bleeding following surgeries or cuts? No   9. Have you ever had problems with anemia or been told to take iron pills? No   10. Have you had any abnormal blood loss such as black, tarry or bloody stools, or abnormal vaginal bleeding? No   11. Have you ever had a blood transfusion? No   12. Are you willing to have a blood transfusion if it is medically needed before, during, or after your surgery? Yes   13. Have you or any of your relatives ever had problems with anesthesia? YES - slow to come out of general anesthesia.  As did her mom and daughter.  She has a lot of nausea from general anesthesia.     14. Do you have sleep apnea, excessive snoring or daytime drowsiness? No   15. Do you have any artifical heart valves or other implanted medical devices like a pacemaker, defibrillator, or continuous glucose monitor? No   16. Do you have artificial joints? No   17. Are you allergic to latex? No   18. Is there any chance that you may be pregnant? No       Health Care Directive:  Patient does not have a Health Care Directive or Living Will: Discussed advance care planning with patient; however, patient declined at this time.    Preoperative Review of :   reviewed - no record of controlled substances  prescribed.      Status of Chronic Conditions:  See problem list for active medical problems.  Problems all longstanding and stable, except as noted/documented.  See ROS for pertinent symptoms related to these conditions.      Review of Systems  Constitutional, neuro, ENT, endocrine, pulmonary, cardiac, gastrointestinal, genitourinary, musculoskeletal, integument and psychiatric systems are negative, except as otherwise noted.    Patient Active Problem List    Diagnosis Date Noted     Closed fracture of left ankle with delayed healing 04/2021     Priority: Medium     Mirena placed 3/2/2022 02/09/2021     Priority: Medium     Injury of jaw, initial encounter (WC) 11/05/2019     Priority: Medium     HOLLY (generalized anxiety disorder) 09/17/2019     Priority: Medium     Moderate major depression (H) 09/17/2019     Priority: Medium     Sprain of neck 10/23/2017     Priority: Medium     Segmental dysfunction of cervical region 10/23/2017     Priority: Medium     Segmental dysfunction of thoracic region 10/23/2017     Priority: Medium     Tension headache 10/23/2017     Priority: Medium     Segmental dysfunction of sacral region 10/23/2017     Priority: Medium     Segmental dysfunction of lumbar region 10/23/2017     Priority: Medium     Lumbago 10/23/2017     Priority: Medium     Seasonal allergic rhinitis, unspecified chronicity, unspecified trigger 08/08/2017     Priority: Medium     Gluten intolerance 08/03/2016     Priority: Medium     Obesity (BMI 35.0-39.9 without comorbidity) 08/03/2016     Priority: Medium      Past Medical History:   Diagnosis Date     Closed fracture of left ankle with delayed healing 04/2021     Dislocation of symphysis pubis, initial encounter 11/01/2017     Mirena placed 3/2/2022 2/9/2021     NO ACTIVE PROBLEMS (aka NONE)      Past Surgical History:   Procedure Laterality Date     LAPAROSCOPIC SALPINGECTOMY Bilateral 8/12/2019    Procedure: SALPINGECTOMY, LAPAROSCOPIC BILATERAL;  Surgeon:  "Dwayne Campos MD;  Location:  OR     NO HISTORY OF SURGERY       Current Outpatient Medications   Medication Sig Dispense Refill     dextromethorphan-guaiFENesin (MUCINEX DM)  MG 12 hr tablet Take 1 tablet by mouth every 12 hours       fluticasone (FLONASE) 50 MCG/ACT nasal spray Spray 2 sprays into both nostrils daily 16 g 3     ibuprofen (ADVIL/MOTRIN) 200 MG capsule Take 200 mg by mouth every 4 hours as needed for fever       levonorgestrel (MIRENA) 20 MCG/24HR IUD 1 each (20 mcg) by Intrauterine route once       loratadine (CLARITIN) 10 MG tablet Take 10 mg by mouth daily       sertraline (ZOLOFT) 50 MG tablet 1/2 tablet (25 mg) nightly for 2 wks then increase to a full tablet (50 mg) nightly 60 tablet 1       No Known Allergies     Social History     Tobacco Use     Smoking status: Never Smoker     Smokeless tobacco: Never Used   Substance Use Topics     Alcohol use: Yes     Comment: socially- very minimal     Family History   Problem Relation Age of Onset     Arthritis Mother      No Known Problems Father      No Known Problems Maternal Grandmother      Diabetes Maternal Grandfather         Type II     Hyperlipidemia Paternal Grandmother      No Known Problems Paternal Grandfather      Depression Brother         6 yrs younger     Anxiety Disorder Brother         on meds     No Known Problems Son      No Known Problems Son      History   Drug Use No         Objective     /70 (BP Location: Left arm, Patient Position: Sitting, Cuff Size: Adult Large)   Pulse 97   Temp 97.2  F (36.2  C) (Temporal)   Resp 12   Ht 1.742 m (5' 8.6\")   Wt 121.1 kg (267 lb)   LMP 04/26/2022 (Exact Date)   SpO2 98%   BMI 39.89 kg/m      Physical Exam    GENERAL APPEARANCE: healthy, alert and no distress     EYES: EOMI, PERRL     HENT: ear canals and TM's normal and nose and mouth without ulcers or lesions     NECK: no adenopathy, no asymmetry, masses, or scars and thyroid normal to palpation     RESP: lungs " clear to auscultation - no rales, rhonchi or wheezes     BREAST: not indicated      CV: regular rates and rhythm, normal S1 S2, no S3 or S4 and no murmur, click or rub     ABDOMEN:  soft, nontender, no HSM or masses and bowel sounds normal     : female: not indicated      MS: decreased ROM with dorsiflexion of the left foot  No other obvious deformities noted.  She has some discomfort with ROM also.      SKIN: no suspicious lesions or rashes     NEURO: Normal strength and tone, sensory exam grossly normal, mentation intact and speech normal     PSYCH: mentation appears normal. and affect normal/bright     LYMPHATICS: No cervical adenopathy    No results for input(s): HGB, PLT, INR, NA, POTASSIUM, CR, A1C in the last 71500 hours.     Diagnostics:  No labs were ordered during this visit.   No EKG required, no history of coronary heart disease, significant arrhythmia, peripheral arterial disease or other structural heart disease.    Revised Cardiac Risk Index (RCRI):  The patient has the following serious cardiovascular risks for perioperative complications:   - No serious cardiac risks = 0 points     RCRI Interpretation: 0 points: Class I (very low risk - 0.4% complication rate)    Electronically signed by:  Dwayne Campos M.D.  5/10/2022     Copy of this evaluation report is provided to requesting physician.

## 2022-06-06 ENCOUNTER — TRANSFERRED RECORDS (OUTPATIENT)
Dept: HEALTH INFORMATION MANAGEMENT | Facility: CLINIC | Age: 37
End: 2022-06-06
Payer: COMMERCIAL

## 2022-07-12 ENCOUNTER — TRANSFERRED RECORDS (OUTPATIENT)
Dept: HEALTH INFORMATION MANAGEMENT | Facility: CLINIC | Age: 37
End: 2022-07-12

## 2022-07-13 NOTE — PROGRESS NOTES
[facility] Obstetrics Post-Partum Progress Note          Assessment and Plan:    Assessment:   Post-partum day #1  Normal spontaneous vaginal delivery  L&D complications: None      Doing well.  Normal healing wound.  No excessive bleeding  Pain well-controlled.      Plan:   Ambulation encouraged  Breast feeding strategies discussed  Lactation consultation  Pain control measures as needed  Reportable signs and symptoms dicussed with the patient           Interval History:   Doing well.  Pain is adequately controlled.  No fevers.  No history of foul-smelling vaginal discharge.  Good appetite.  Denies chest pain, shortness of breath, nausea or vomiting.  Vaginal bleeding is similar to a heavy menstrual flow.  Breastfeeding not so well, will delee suction the baby's stomach since it is a bit gaggy and spitty after trying to nurse.             Significant Problems:    None          Review of Systems:    The patient denies any chest pain, shortness of breath, excessive pain, fever, chills, purulent drainage from the wound, nausea or vomiting.          Medications:   All medications related to the patient's surgery have been reviewed          Physical Exam:   All vitals stable  Temp: 98.1  F (36.7  C) Temp src: Oral BP: 109/61   Heart Rate: 62 Resp: 16 SpO2: 100 %        CV:  RRR nl S1/S2 no murmur.  LUNGS:  Clear to auscultation bilaterally, no wheezing, rhonci or rales.   Abdomen: FH is just at the U and appropriately tender.   Extremities: no edema              Data:     All laboratory data related to this surgery reviewed  Hemoglobin   Date Value Ref Range Status   12/28/2017 11.0 (L) 11.7 - 15.7 g/dL Final   11/29/2017 11.2 (L) 11.7 - 15.7 g/dL Final   10/02/2017 11.2 (L) 11.7 - 15.7 g/dL Final   06/07/2017 11.9 11.7 - 15.7 g/dL Final   01/20/2015 11.0 (L) 11.7 - 15.7 g/dL Final     No imaging studies have been ordered    Electronically signed by:  Dwayne Campos M.D.  12/28/2017          ambulatory

## 2022-07-20 ENCOUNTER — TELEPHONE (OUTPATIENT)
Dept: FAMILY MEDICINE | Facility: CLINIC | Age: 37
End: 2022-07-20

## 2022-07-20 NOTE — TELEPHONE ENCOUNTER
Patient is calling and stated she is had her IUD placed in 3/2022.  She stated this is the first month since the implant that she has not bled or spotted.  She stated she has had a constant headache ranging from 2-9/10 for the past 14 days.  Patient stated she has tried home remedies, OTC medications, quiet dark room, chiropractor, etc.  She stated nothing is completely taking the headache completely away.  Patient stated she is getting fatigued from having a headache this long and is wondering if it is not from the IUD.  Patient would please like a call back for recommendations on treatment and/or fit into schedule for office visit soon to discuss these concerns. Patient stated we may leave a detailed message on her phone.  Advised patient to seek urgent/ emergent care for worsening symptoms.  Patient stated understanding.    Will forward to provider team for review.    Prabha John RN

## 2022-07-26 ASSESSMENT — PATIENT HEALTH QUESTIONNAIRE - PHQ9
10. IF YOU CHECKED OFF ANY PROBLEMS, HOW DIFFICULT HAVE THESE PROBLEMS MADE IT FOR YOU TO DO YOUR WORK, TAKE CARE OF THINGS AT HOME, OR GET ALONG WITH OTHER PEOPLE: SOMEWHAT DIFFICULT
SUM OF ALL RESPONSES TO PHQ QUESTIONS 1-9: 6
SUM OF ALL RESPONSES TO PHQ QUESTIONS 1-9: 6

## 2022-07-27 ENCOUNTER — OFFICE VISIT (OUTPATIENT)
Dept: FAMILY MEDICINE | Facility: CLINIC | Age: 37
End: 2022-07-27
Payer: COMMERCIAL

## 2022-07-27 VITALS
HEART RATE: 116 BPM | OXYGEN SATURATION: 97 % | RESPIRATION RATE: 14 BRPM | SYSTOLIC BLOOD PRESSURE: 130 MMHG | HEIGHT: 69 IN | TEMPERATURE: 97 F | BODY MASS INDEX: 37.77 KG/M2 | DIASTOLIC BLOOD PRESSURE: 78 MMHG | WEIGHT: 255 LBS

## 2022-07-27 DIAGNOSIS — G43.109 MIGRAINE WITH AURA AND WITHOUT STATUS MIGRAINOSUS, NOT INTRACTABLE: Primary | ICD-10-CM

## 2022-07-27 DIAGNOSIS — Z30.431 INTRAUTERINE DEVICE SURVEILLANCE: ICD-10-CM

## 2022-07-27 PROCEDURE — 99214 OFFICE O/P EST MOD 30 MIN: CPT | Performed by: FAMILY MEDICINE

## 2022-07-27 RX ORDER — ASPIRIN 325 MG
325 TABLET, DELAYED RELEASE (ENTERIC COATED) ORAL
COMMUNITY
Start: 2022-05-23

## 2022-07-27 RX ORDER — PROPRANOLOL HCL 60 MG
60 CAPSULE, EXTENDED RELEASE 24HR ORAL DAILY
Qty: 90 CAPSULE | Refills: 3 | Status: SHIPPED | OUTPATIENT
Start: 2022-07-27 | End: 2023-09-20

## 2022-07-27 RX ORDER — ONDANSETRON 4 MG/1
8 TABLET, ORALLY DISINTEGRATING ORAL
COMMUNITY
Start: 2022-05-23

## 2022-07-27 ASSESSMENT — PATIENT HEALTH QUESTIONNAIRE - PHQ9
10. IF YOU CHECKED OFF ANY PROBLEMS, HOW DIFFICULT HAVE THESE PROBLEMS MADE IT FOR YOU TO DO YOUR WORK, TAKE CARE OF THINGS AT HOME, OR GET ALONG WITH OTHER PEOPLE: SOMEWHAT DIFFICULT
SUM OF ALL RESPONSES TO PHQ QUESTIONS 1-9: 6

## 2022-07-27 ASSESSMENT — PAIN SCALES - GENERAL: PAINLEVEL: MILD PAIN (3)

## 2022-07-27 NOTE — PROGRESS NOTES
"  Assessment & Plan     (G43.109) Migraine with aura and without status migrainosus, not intractable  (primary encounter diagnosis)  Comment: Chronic recurring migraines that have worsened since her IUD placement.  Plan: propranolol ER (INDERAL LA) 60 MG 24 hr capsule        she is going to try using propranolol long-acting 60 mg tablets once every evening.  I explained to her the rationale for using a beta-blocker to help control prophylactically her migraines.  She will let me know how things are going over the next 2 weeks and whether or not she has improvement in the frequency of the headaches.  They are improving but have not gone away completely then we can increase the dose to 120 mg.    (Z30.431) Intrauterine device surveillance  Comment: Her last period was the first week in June of this year and she is not having any pain since the IUD placement was done.  Plan: Hoping that she will maintain her amenorrhea.  The IUD can be in place for a total of 6 years.  She would need to have her was replaced in March 2028.      25 minutes spent on the date of the encounter doing chart review, history and exam, documentation and further activities per the note         Return with a phone message in 2 weeks to update me on her headache status..    Electronically signed by:  Dwayne Campos M.D.  7/27/2022      Lucille Lynn is a 37 year old accompanied by herslef, presenting for the following health issues:  Contraception      Contraception    History of Present Illness       Headaches:   Since the patient's last clinic visit, headaches are: no change  The patient is getting headaches:  Daily  She is not able to do normal daily activities when she has a migraine.  The patient is taking the following rescue/relief medications:  Ibuprofen (Advil, Motrin), Tylenol and Excedrin   Patient states \"The relief is inconsistent\" from the rescue/relief medications.   The patient is taking the following medications to " "prevent migraines:  No medications to prevent migraines  In the past 4 weeks, the patient has gone to an Urgent Care or Emergency Room 0 times times due to headaches.    She eats 4 or more servings of fruits and vegetables daily.She consumes 0 sweetened beverage(s) daily.She exercises with enough effort to increase her heart rate 30 to 60 minutes per day.  She exercises with enough effort to increase her heart rate 4 days per week. She is missing 3 dose(s) of medications per week.  She is not taking prescribed medications regularly due to remembering to take.    Today's PHQ-9         PHQ-9 Total Score: 6    PHQ-9 Q9 Thoughts of better off dead/self-harm past 2 weeks :   Not at all    How difficult have these problems made it for you to do your work, take care of things at home, or get along with other people: Somewhat difficult     Patient is worried that her headaches have gotten worse since her IUD was placed.  She did not have this experience with her last one.  She has finally become amenorrheic with the IUD so she does not want to have it removed unless her headaches do not improve.  Their primary left-sided concern in the temple area but can travel to behind the eye and more posterior towards the occiput.  She also occasionally will get right-sided.  She does get a sense primarily vision or a type sensation when her headaches are about to start.  If she can get them under control with Tylenol or ibuprofen, she does get significant light sensitivity, nausea and increased throbbing.  She denies any focal neurological findings.    Review of Systems   Constitutional, HEENT, cardiovascular, pulmonary, gi and gu systems are negative, except as otherwise noted.      Objective    /78 (BP Location: Left arm, Patient Position: Sitting, Cuff Size: Adult Regular)   Pulse 116   Temp 97  F (36.1  C) (Temporal)   Resp 14   Ht 1.742 m (5' 8.6\")   Wt 115.7 kg (255 lb)   SpO2 97%   BMI 38.10 kg/m    Body mass index " is 38.1 kg/m .  Physical Exam   GENERAL: healthy, alert and no distress  EYES: Eyes grossly normal to inspection, PERRL and conjunctivae and sclerae normal  RESP: lungs clear to auscultation - no rales, rhonchi or wheezes  CV: regular rate and rhythm, normal S1 S2, no S3 or S4, no murmur, click or rub, no peripheral edema and peripheral pulses strong  NEURO: Normal strength and tone, mentation intact and speech normal    No diagnostics were done today.              .  ..

## 2022-09-10 ENCOUNTER — HEALTH MAINTENANCE LETTER (OUTPATIENT)
Age: 37
End: 2022-09-10

## 2022-12-12 ENCOUNTER — IMMUNIZATION (OUTPATIENT)
Dept: FAMILY MEDICINE | Facility: CLINIC | Age: 37
End: 2022-12-12
Payer: COMMERCIAL

## 2022-12-12 PROCEDURE — 90471 IMMUNIZATION ADMIN: CPT

## 2022-12-12 PROCEDURE — 90686 IIV4 VACC NO PRSV 0.5 ML IM: CPT

## 2023-02-27 ENCOUNTER — MYC MEDICAL ADVICE (OUTPATIENT)
Dept: FAMILY MEDICINE | Facility: CLINIC | Age: 38
End: 2023-02-27
Payer: COMMERCIAL

## 2023-02-27 DIAGNOSIS — J02.0 STREP THROAT: Primary | ICD-10-CM

## 2023-04-30 ENCOUNTER — HEALTH MAINTENANCE LETTER (OUTPATIENT)
Age: 38
End: 2023-04-30

## 2023-08-01 ENCOUNTER — MYC MEDICAL ADVICE (OUTPATIENT)
Dept: FAMILY MEDICINE | Facility: CLINIC | Age: 38
End: 2023-08-01
Payer: COMMERCIAL

## 2023-08-02 ENCOUNTER — NURSE TRIAGE (OUTPATIENT)
Dept: FAMILY MEDICINE | Facility: CLINIC | Age: 38
End: 2023-08-02
Payer: COMMERCIAL

## 2023-08-02 DIAGNOSIS — Z30.431 INTRAUTERINE DEVICE SURVEILLANCE: Primary | ICD-10-CM

## 2023-08-02 NOTE — TELEPHONE ENCOUNTER
We can go ahead and order an ultrasound to check for proper positioning of the IUD.  If it has not changed position at all, I would just advise using ibuprofen 6 to 800 mg 3 times a day while she is having the cramping.  That will help minimize that and hopefully her body will get used to it again.  I placed an order for the ultrasound of 72 wants to get that set up for her.    Electronically signed by:  Dwayne Campos M.D.  8/2/2023

## 2023-08-02 NOTE — TELEPHONE ENCOUNTER
Patient reports having mild cramping for the past month. She had a Mirena placed in March of 2022 and did have some spotting and cramping for 5-6 months with it but has been fine for the past year. The beginning of July, she began having the same symptoms she has had with her periods, mild cramping, mild headaches, spotting, and pelvic pain. Please see my chart message attached below.    Last office visit 7/27/22. She is not scheduled again until 11/21/23. Do you want to work her in?    СЕРГЕЙ Momin, RN      [facility] Messages (supporting Dwayne Campos MD)14 hours ago (6:07 PM)       No I am not.        Renay Olmedo RN Megan Michelle Hatch14 hours ago (5:40 PM)     JR Lynn,  Are you able to feel the strings?       [facility] Messages (supporting Dwayne Campos MD)16 hours ago (3:49 PM)     MH  Hello. I have been having cramping off and on over the past few weeks. In my lower stomach and lower back and at times in my pelvic area and into my upper legs. Exactly all the symptoms I use to have when I got my menstrual cycle except for the bleeding part. A few weeks ago I spotted off and on for a few days but nothing since. I have also been having a lot more headaches again recently. Since the six month jessica of my IUD I haven t had any of these symptoms, so wondering what else could be going on? Are these signs that my IUD needs to be changed or wondering what else is going on?      Thank you   Leah            Reason for Disposition   Pelvic pain is a chronic symptom (recurrent or ongoing AND present > 4 weeks)    Additional Information   Negative: Shock suspected (e.g., cold/pale/clammy skin, too weak to stand, low BP, rapid pulse)   Negative: Passed out (i.e., lost consciousness, collapsed and was not responding)   Negative: Sounds like a life-threatening emergency to the triager   Negative: Menstrual cramps are main concern   Negative:  Abdominal (stomach) pain is main concern   Negative: Vulvar (external genital area) pain or symptoms (e.g., dryness, sores, redness, blisters, bumps) is main concern   Negative: Rectal pain is main concern   Negative: Hip pain is main concern   Negative: Urination pain is main concern (pain with passing urine)   Negative: Pelvic pain and pregnant < 20 weeks   Negative: Pelvic pain and pregnant 20 or more weeks   Negative: Followed an abdomen (stomach) injury   Negative: Followed a genital area injury (e.g., vagina, vulva)   Negative: SEVERE pelvic pain (e.g., excruciating) and vomiting   Negative: SEVERE pelvic pain and present > 1 hour   Negative: SEVERE vaginal bleeding (e.g., soaking 2 pads or tampons per hour and present 2 or more hours; 1 menstrual cup every 2 hours)   Negative: MODERATE vaginal bleeding (i.e., soaking pad or tampon per hour and present > 6 hours; 1 menstrual cup every 6 hours)   Negative: Patient sounds very sick or weak to the triager   Negative: Constant pelvic pain lasting > 2 hours   Negative: Fever > 103 F (39.4 C)   Negative: Fever > 101 F (38.3 C) and age > 60 years   Negative: Fever > 100.0 F (37.8 C) and bedridden (e.g., nursing home patient, CVA, chronic illness, recovering from surgery)   Negative: Fever > 100.0 F (37.8 C) and diabetes mellitus or weak immune system (e.g., HIV positive, cancer chemo, splenectomy, organ transplant, chronic steroids)   Negative: SEVERE pelvic pain and present < 1 hour   Negative: Patient wants to be seen   Negative: Pain with sexual intercourse (dyspareunia) and new-onset (in past 4 weeks)   Negative: Patient is worried they have a sexually transmitted infection (STI)   Negative: MILD to MODERATE pain that comes and goes (cramps) and present > 48 hours   Negative: Pregnancy suspected (e.g., missed last menstrual period)   Negative: Unusual vaginal discharge (e.g., bad smelling, yellow, green, or foamy-white)   Negative: Blood in urine (red, pink, or  "tea-colored)    Answer Assessment - Initial Assessment Questions  1. LOCATION: \"Where does it hurt?\"       Crampy  2. RADIATION: \"Does the pain shoot anywhere else?\" (e.g., lower back, groin, thighs)      Back, legs  3. ONSET: \"When did the pain begin?\" (e.g., minutes, hours or days ago)       The past month  4. SUDDEN: \"Gradual or sudden onset?\"      Happened in the past month  5. PATTERN \"Does the pain come and go, or is it constant?\"     - If constant: \"Is it getting better, staying the same, or worsening?\"       (Note: Constant means the pain never goes away completely; most serious pain is constant and gets worse over time)      - If intermittent: \"How long does it last?\" \"Do you have pain now?\"      (Note: Intermittent means the pain goes away completely between bouts)      constant  6. SEVERITY: \"How bad is the pain?\"  (e.g., Scale 1-10; mild, moderate, or severe)    - MILD (1-3): doesn't interfere with normal activities, area soft and not tender to touch     - MODERATE (4-7): interferes with normal activities or awakens from sleep, abdomen tender to touch     - SEVERE (8-10): excruciating pain, doubled over, unable to do any normal activities       mild  7. RECURRENT SYMPTOM: \"Have you ever had this type of pelvic pain before?\" If Yes, ask: \"When was the last time?\" and \"What happened that time?\" Previous menstrual cycles      Yes, when she would have menstrual cycle  8. CAUSE: \"What do you think is causing the pelvic pain?\"      Unsure if hormonal  9. RELIEVING/AGGRAVATING FACTORS: \"What makes it better or worse?\" (e.g., activity/rest, sexual intercourse, voiding, passing stool)      Takes Ibuprofen, Tylenol  10. OTHER SYMPTOMS: \"Has there been any other symptoms?\" (e.g., fever, constipation, diarrhea, urine problems, vaginal bleeding, vaginal discharge, or vomiting?\"        spotting  11. PREGNANCY: \"Is there any chance you are pregnant?\" \"When was your last menstrual period?\"        No, has " IUD    Protocols used: Pelvic Pain - Female-A-[place]

## 2023-08-07 ENCOUNTER — HOSPITAL ENCOUNTER (OUTPATIENT)
Dept: ULTRASOUND IMAGING | Facility: CLINIC | Age: 38
Discharge: HOME | End: 2023-08-07
Attending: FAMILY MEDICINE | Admitting: FAMILY MEDICINE
Payer: COMMERCIAL

## 2023-08-07 DIAGNOSIS — Z30.431 INTRAUTERINE DEVICE SURVEILLANCE: ICD-10-CM

## 2023-08-07 PROCEDURE — 76830 TRANSVAGINAL US NON-OB: CPT

## 2023-08-09 ENCOUNTER — MYC MEDICAL ADVICE (OUTPATIENT)
Dept: FAMILY MEDICINE | Facility: CLINIC | Age: 38
End: 2023-08-09
Payer: COMMERCIAL

## 2023-08-09 DIAGNOSIS — M54.50 ACUTE MIDLINE LOW BACK PAIN WITHOUT SCIATICA: ICD-10-CM

## 2023-08-09 DIAGNOSIS — R10.9 ABDOMINAL CRAMPING: Primary | ICD-10-CM

## 2023-08-16 ENCOUNTER — HOSPITAL ENCOUNTER (OUTPATIENT)
Dept: GENERAL RADIOLOGY | Facility: CLINIC | Age: 38
Discharge: HOME | End: 2023-08-16
Attending: FAMILY MEDICINE | Admitting: FAMILY MEDICINE
Payer: COMMERCIAL

## 2023-08-16 DIAGNOSIS — R10.9 ABDOMINAL CRAMPING: ICD-10-CM

## 2023-08-16 PROCEDURE — 74019 RADEX ABDOMEN 2 VIEWS: CPT

## 2023-08-16 NOTE — TELEPHONE ENCOUNTER
I could see her at 9:40 or 10 am on 9/20/23.  I placed an order for an abdominal xray that she can do that ahead of time.      Electronically signed by:  Dwayne Campos M.D.  8/16/2023

## 2023-09-20 ENCOUNTER — HOSPITAL ENCOUNTER (OUTPATIENT)
Dept: GENERAL RADIOLOGY | Facility: CLINIC | Age: 38
Discharge: HOME | End: 2023-09-20
Attending: FAMILY MEDICINE
Payer: COMMERCIAL

## 2023-09-20 ENCOUNTER — OFFICE VISIT (OUTPATIENT)
Dept: FAMILY MEDICINE | Facility: CLINIC | Age: 38
End: 2023-09-20
Payer: COMMERCIAL

## 2023-09-20 ENCOUNTER — MYC MEDICAL ADVICE (OUTPATIENT)
Dept: FAMILY MEDICINE | Facility: CLINIC | Age: 38
End: 2023-09-20

## 2023-09-20 VITALS
RESPIRATION RATE: 18 BRPM | WEIGHT: 262.13 LBS | DIASTOLIC BLOOD PRESSURE: 88 MMHG | BODY MASS INDEX: 39.73 KG/M2 | SYSTOLIC BLOOD PRESSURE: 128 MMHG | OXYGEN SATURATION: 98 % | HEART RATE: 88 BPM | HEIGHT: 68 IN | TEMPERATURE: 97.6 F

## 2023-09-20 DIAGNOSIS — R10.2 PELVIC CRAMPING: ICD-10-CM

## 2023-09-20 DIAGNOSIS — G43.109 MIGRAINE WITH AURA AND WITHOUT STATUS MIGRAINOSUS, NOT INTRACTABLE: ICD-10-CM

## 2023-09-20 DIAGNOSIS — F32.1 MODERATE MAJOR DEPRESSION (H): ICD-10-CM

## 2023-09-20 DIAGNOSIS — M53.3 TAIL BONE PAIN: Primary | ICD-10-CM

## 2023-09-20 DIAGNOSIS — M53.3 TAIL BONE PAIN: ICD-10-CM

## 2023-09-20 DIAGNOSIS — F41.1 GAD (GENERALIZED ANXIETY DISORDER): ICD-10-CM

## 2023-09-20 PROCEDURE — 72100 X-RAY EXAM L-S SPINE 2/3 VWS: CPT

## 2023-09-20 PROCEDURE — 72220 X-RAY EXAM SACRUM TAILBONE: CPT

## 2023-09-20 PROCEDURE — 99214 OFFICE O/P EST MOD 30 MIN: CPT | Mod: 25 | Performed by: FAMILY MEDICINE

## 2023-09-20 PROCEDURE — 90686 IIV4 VACC NO PRSV 0.5 ML IM: CPT | Performed by: FAMILY MEDICINE

## 2023-09-20 PROCEDURE — 90471 IMMUNIZATION ADMIN: CPT | Performed by: FAMILY MEDICINE

## 2023-09-20 RX ORDER — PROPRANOLOL HCL 60 MG
60 CAPSULE, EXTENDED RELEASE 24HR ORAL DAILY
Qty: 90 CAPSULE | Refills: 3 | Status: SHIPPED | OUTPATIENT
Start: 2023-09-20

## 2023-09-20 RX ORDER — PREDNISONE 20 MG/1
60 TABLET ORAL DAILY
Qty: 42 TABLET | Refills: 0 | Status: SHIPPED | OUTPATIENT
Start: 2023-09-20 | End: 2023-10-04

## 2023-09-20 ASSESSMENT — PATIENT HEALTH QUESTIONNAIRE - PHQ9
10. IF YOU CHECKED OFF ANY PROBLEMS, HOW DIFFICULT HAVE THESE PROBLEMS MADE IT FOR YOU TO DO YOUR WORK, TAKE CARE OF THINGS AT HOME, OR GET ALONG WITH OTHER PEOPLE: NOT DIFFICULT AT ALL
SUM OF ALL RESPONSES TO PHQ QUESTIONS 1-9: 8
SUM OF ALL RESPONSES TO PHQ QUESTIONS 1-9: 8

## 2023-09-20 ASSESSMENT — PAIN SCALES - GENERAL: PAINLEVEL: MODERATE PAIN (4)

## 2023-09-20 ASSESSMENT — ENCOUNTER SYMPTOMS: BACK PAIN: 1

## 2023-09-20 NOTE — COMMUNITY RESOURCES LIST (ENGLISH)
09/20/2023   [facility] - Outpatient Clinics  N/A  For additional resource needs, please contact your health insurance member services or your primary care team.  Phone: [phone]   Email: N/A   Address: [address] [place] [address]   Hours: N/A        Hotlines and Helplines       Hotline - Housing crisis  1  [facility] Resource Line Distance: 19.96 miles      Phone/Virtual   [address] [place] [address]  Language: English  Hours: Mon - Sun Open 24 Hours   Phone: (794) 579-4578 Website: [email or web address]          Housing       Housing search assistance  2  [facility] - [facility] Services -  & Public Health Distance: 20.2 miles      Phone/Virtual   [address] [place] [address]  Language: English  Hours: Mon - Fri 8:00 AM - 4:30 PM  Fees: Free   Phone: (162) 921-6681 Email: hsfsprograms@[facility]. Website: [email or web address]/Health-[email or web address]-Services          Important Numbers & Websites       [facility]   [address] 211itedway.org  Poison Control   (221) 585-2923 [place]  Suicide and Crisis Lifeline   [address] [place], [place] [address]line.org  Childhelp [place] Child Abuse Hotline   155.699.6890 Childhelphotline.org  National Sexual Assault Hotline   (757) 827-1104 (HOPE) Rainn.org  National Runaway Safeline   (651) 182-4123 (RUNAWAY) [facility]  Pregnancy & Postpartum Support [place]   Call/text 662-042-2070 Ppsupport[place].org  Substance Abuse National Helpline ([facility]   384-036-HELP (0736) Findtreatment.gov  Emergency Services   911

## 2023-09-20 NOTE — PROGRESS NOTES
Assessment & Plan     (M53.3) Tail bone pain  (primary encounter diagnosis)  Comment: Traumatic injury December 2022 from sledding.  Plan: XR Lumbar Spine 2/3 Views, XR Sacrum and Coccyx        2 Views, predniSONE (DELTASONE) 20 MG tablet        We will obtain x-ray of the coccyx and sacrum to make sure she does not have any obvious fractures.  If that is negative then we can do a CT scan of the pelvis to look for pathology.    (R10.2) Pelvic cramping  Comment: Patient has constant lower pelvis cramping that are since she had her IUD placed.  Plan: predniSONE (DELTASONE) 20 MG tablet        We will try to settle this down with 14 days of prednisone and if it returns then we may need to consider removing her IUD and looking at other options to control her menorrhagia.  That most likely would be a GYN consult for hysterectomy.    (F32.1) Moderate major depression (H)  (F41.1) HOLLY (generalized anxiety disorder)  Comment: Depression and anxiety have been a little worse since starting back at school.  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  Plan: sertraline (ZOLOFT) 50 MG tablet        We will going to increase her sertraline by another 25 mg.  She will keep me posted on how she is doing.  She is contemplating changing jobs if things do not improve.    (G43.109) Migraine with aura and without status migrainosus, not intractable  Comment: She had stopped her propranolol and feels like she wants to go back on it  Plan: propranolol ER (INDERAL LA) 60 MG 24 hr capsule        We will restart the propranolol ER 60 mg.  She is to take this every evening and give it a good 2 to 3 months to see how it is helping with migraine prophylaxis.        25 minutes spent by me on the date of the encounter doing chart review, history and exam, documentation and further activities per the note     BMI:   Estimated body mass index is 39.39 kg/m  as calculated from the following:    Height as of this  "encounter: 1.737 m (5' 8.4\").    Weight as of this encounter: 118.9 kg (262 lb 2 oz).   Weight management plan: She has been unable to lose weight but has not been doing a lot of exercising due to her tailbone pain.    MEDICATIONS:   Orders Placed This Encounter   Medications    predniSONE (DELTASONE) 20 MG tablet     Sig: Take 3 tablets (60 mg) by mouth daily for 14 days     Dispense:  42 tablet     Refill:  0    propranolol ER (INDERAL LA) 60 MG 24 hr capsule     Sig: Take 1 capsule (60 mg) by mouth daily     Dispense:  90 capsule     Refill:  3    sertraline (ZOLOFT) 50 MG tablet     Sig: Take 1.5 tablets (75 mg) by mouth daily 1/2 tablet (25 mg) nightly for 2 wks then increase to a full tablet (50 mg) nightly     Dispense:  135 tablet     Refill:  3     Note dose change          - Continue other medications without change    Electronically signed by:  Dwayne Campos M.D.  9/20/2023      Lucille Lynn is a 38 year old, presenting for the following health issues:  Back Pain      9/20/2023     9:52 AM   Additional Questions   Roomed by Roxana CAST       Back Pain     History of Present Illness       Back Pain:  She presents for follow up of back pain. Patient's back pain is a chronic problem.  Location of back pain:  Other  Description of back pain: dull ache  Back pain spreads: right buttocks and left buttocks    Since patient first noticed back pain, pain is: always present, but gets better and worse  Does back pain interfere with her job:  Yes       She eats 2-3 servings of fruits and vegetables daily.She consumes 0 sweetened beverage(s) daily.She exercises with enough effort to increase her heart rate 20 to 29 minutes per day.  She exercises with enough effort to increase her heart rate 3 or less days per week.   She is taking medications regularly.         Back pain since she went sledding in December and landed hard on her buttock.  She was sliding down a hill and hit a bump and landed flat on her " "buttock causing a lot of pain at the time.  She thought she just bruised something but it really has not gotten any better and seems to be worse at times.  It is really hard for her to sit for about prolonged periods of time.  She is also having a lot of pelvic cramping like her period is coming, but no bleeding since her IUD has been in place.  No pain with intercourse, just constant low-grade cramps.  We talked about using ibuprofen that helps a little bit but she does not take it regularly since she is so busy at work.  She honestly says she does not think about it.  We discussed trying some prednisone to see if we can settle down any low-grade inflammation from the placement of the IUD and see if that improves.    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  Her anxiety and depression levels have been higher.  She is more irritable with the kids at home and with her .  She feels bad that her kids tell her she is crappy.  We discussed increasing her sertraline by another 25 mg to see if that can help even things out for her.  She is willing to give that a try.      Review of Systems   Musculoskeletal:  Positive for back pain.      Constitutional, HEENT, cardiovascular, pulmonary, gi and gu systems are negative, except as otherwise noted.      Objective    /88   Pulse 88   Temp 97.6  F (36.4  C) (Temporal)   Resp 18   Ht 1.737 m (5' 8.4\")   Wt 118.9 kg (262 lb 2 oz)   LMP  (LMP Unknown)   SpO2 98%   BMI 39.39 kg/m    Body mass index is 39.39 kg/m .  Physical Exam   GENERAL: healthy, alert and no distress  RESP: lungs clear to auscultation - no rales, rhonchi or wheezes  CV: regular rate and rhythm, normal S1 S2, no S3 or S4, no murmur, click or rub, no peripheral edema and peripheral pulses strong  ABDOMEN: Obese soft no specific tenderness noted with palpation in the lower abdomen bilaterally.  Back: She has been very tender over the sacral " area and lower along the coccyx.  She does not like me pushing on this area at all.  She is minimally tender over the SI joint bilaterally.    Xray - Reviewed, report states no abnormal findings in the sacrum or the coccyx.  She is got some mild degenerative changes.  She is interested in doing a CT scan to look for other pathology.

## 2023-09-22 ENCOUNTER — PATIENT OUTREACH (OUTPATIENT)
Dept: CARE COORDINATION | Facility: CLINIC | Age: 38
End: 2023-09-22
Payer: COMMERCIAL

## 2023-09-22 PROBLEM — M53.3 TAIL BONE PAIN: Status: ACTIVE | Noted: 2023-09-22

## 2023-09-22 PROBLEM — R10.2 PELVIC CRAMPING: Status: ACTIVE | Noted: 2023-09-22

## 2023-09-22 NOTE — PROGRESS NOTES
Clinic Care Coordination Contact  [facility]/Voicemail       Clinical Data: Care Coordinator Outreach  Outreach attempted x 1.  Left message on patient's voicemail with call back information and requested return call.  Plan: Care Coordinator will try to reach patient again in 1-2 business days.    XOCHITL Bass  [facility] Care Coordination  [facility]  [phone]

## 2023-09-25 ENCOUNTER — HOSPITAL ENCOUNTER (OUTPATIENT)
Dept: CT IMAGING | Facility: CLINIC | Age: 38
Discharge: HOME | End: 2023-09-25
Attending: FAMILY MEDICINE | Admitting: FAMILY MEDICINE
Payer: COMMERCIAL

## 2023-09-25 DIAGNOSIS — M53.3 TAIL BONE PAIN: ICD-10-CM

## 2023-09-25 PROCEDURE — 72192 CT PELVIS W/O DYE: CPT

## 2023-09-25 NOTE — PROGRESS NOTES
Clinic Care Coordination Contact  [facility]/Voicemail       Clinical Data: Care Coordinator Outreach  Outreach attempted x 2.  Left message on patient's voicemail with call back information and requested return call.  Plan: Care Coordinator will send care coordination introduction letter with care coordinator contact information and explanation of care coordination services via [employer]. Care Coordinator will do no further outreaches at this time.    XOCHITL Bass  [facility] Care Coordination  [facility]  [phone]

## 2023-10-01 ENCOUNTER — MYC MEDICAL ADVICE (OUTPATIENT)
Dept: FAMILY MEDICINE | Facility: CLINIC | Age: 38
End: 2023-10-01
Payer: COMMERCIAL

## 2023-10-05 ENCOUNTER — OFFICE VISIT (OUTPATIENT)
Dept: FAMILY MEDICINE | Facility: CLINIC | Age: 38
End: 2023-10-05
Payer: COMMERCIAL

## 2023-10-05 VITALS
DIASTOLIC BLOOD PRESSURE: 74 MMHG | HEIGHT: 69 IN | WEIGHT: 263.2 LBS | OXYGEN SATURATION: 97 % | BODY MASS INDEX: 38.98 KG/M2 | RESPIRATION RATE: 14 BRPM | SYSTOLIC BLOOD PRESSURE: 118 MMHG | TEMPERATURE: 97.4 F | HEART RATE: 79 BPM

## 2023-10-05 DIAGNOSIS — Z30.432 ENCOUNTER FOR IUD REMOVAL: Primary | ICD-10-CM

## 2023-10-05 PROCEDURE — 58301 REMOVE INTRAUTERINE DEVICE: CPT | Performed by: FAMILY MEDICINE

## 2023-10-05 ASSESSMENT — PAIN SCALES - GENERAL: PAINLEVEL: MILD PAIN (3)

## 2023-10-05 NOTE — PROGRESS NOTES
S/  Patient in for removal of her IUD.  This was placed back on 3/2/2022 without complication.  She has initially not had any issue with bleeding or discomfort since placement, but over the past 6 months she has had issues with cramping and low back pain.  She wants the IUD taken out.    O/  /60  Pulse 80  Temp (Src) 97.6 (Tympanic)  Wt 140 lbs (63.5kg)      Pelvic exam: normal vagina and vulva,normal cervix without lesions or tenderness, IUD string visualized.  This was grasped with a ring forceps and with little effort, the IUD was easily removed.  Inspection revealed the IUD to be intact.    The patient tolerated this very well without any significant cramping or discomfort.      A/  IUD removal    P/  Will continue to need follow up on a yearly basis for her annual exams.  She is aware that her cycle may return to its native form and if she has heavy painful periods she may need to have a gyn consultation for a partial hysterectomy.since she doesn't tolerate OCPs well and would not want another IUD. She will keep me posted.        Electronically signed by:  Dwayne Campos M.D.  1/26/2004

## 2023-11-21 ENCOUNTER — OFFICE VISIT (OUTPATIENT)
Dept: FAMILY MEDICINE | Facility: CLINIC | Age: 38
End: 2023-11-21
Payer: COMMERCIAL

## 2023-11-21 VITALS
HEIGHT: 69 IN | DIASTOLIC BLOOD PRESSURE: 80 MMHG | TEMPERATURE: 97 F | OXYGEN SATURATION: 98 % | RESPIRATION RATE: 20 BRPM | BODY MASS INDEX: 38.95 KG/M2 | HEART RATE: 96 BPM | SYSTOLIC BLOOD PRESSURE: 118 MMHG | WEIGHT: 263 LBS

## 2023-11-21 DIAGNOSIS — R73.09 ELEVATED GLUCOSE: ICD-10-CM

## 2023-11-21 DIAGNOSIS — Z78.9 HEPATITIS B VACCINATION STATUS UNKNOWN: ICD-10-CM

## 2023-11-21 DIAGNOSIS — N94.6 DYSMENORRHEA: ICD-10-CM

## 2023-11-21 DIAGNOSIS — E66.09 CLASS 2 OBESITY DUE TO EXCESS CALORIES WITHOUT SERIOUS COMORBIDITY WITH BODY MASS INDEX (BMI) OF 38.0 TO 38.9 IN ADULT: ICD-10-CM

## 2023-11-21 DIAGNOSIS — Z00.01 ENCOUNTER FOR ROUTINE ADULT MEDICAL EXAM WITH ABNORMAL FINDINGS: Primary | ICD-10-CM

## 2023-11-21 DIAGNOSIS — N92.0 MENORRHAGIA WITH REGULAR CYCLE: ICD-10-CM

## 2023-11-21 DIAGNOSIS — E66.812 CLASS 2 OBESITY DUE TO EXCESS CALORIES WITHOUT SERIOUS COMORBIDITY WITH BODY MASS INDEX (BMI) OF 38.0 TO 38.9 IN ADULT: ICD-10-CM

## 2023-11-21 PROBLEM — Z30.430 ENCOUNTER FOR INSERTION OF INTRAUTERINE CONTRACEPTIVE DEVICE: Status: RESOLVED | Noted: 2021-02-09 | Resolved: 2023-11-21

## 2023-11-21 PROCEDURE — 99395 PREV VISIT EST AGE 18-39: CPT | Performed by: FAMILY MEDICINE

## 2023-11-21 PROCEDURE — 99214 OFFICE O/P EST MOD 30 MIN: CPT | Mod: 25 | Performed by: FAMILY MEDICINE

## 2023-11-21 ASSESSMENT — ENCOUNTER SYMPTOMS
EYE PAIN: 0
CONSTIPATION: 0
PALPITATIONS: 0
BREAST MASS: 0
MYALGIAS: 0
DYSURIA: 0
CHILLS: 0
DIARRHEA: 0
ARTHRALGIAS: 0
SORE THROAT: 0
HEMATOCHEZIA: 0
COUGH: 0
NAUSEA: 0
HEMATURIA: 0
DIZZINESS: 0
SHORTNESS OF BREATH: 0
ABDOMINAL PAIN: 0
FEVER: 0
WEAKNESS: 0
FREQUENCY: 0
PARESTHESIAS: 0
HEADACHES: 0
NERVOUS/ANXIOUS: 0
JOINT SWELLING: 0
HEARTBURN: 0

## 2023-11-21 ASSESSMENT — PATIENT HEALTH QUESTIONNAIRE - PHQ9: SUM OF ALL RESPONSES TO PHQ QUESTIONS 1-9: 7

## 2023-11-21 ASSESSMENT — PAIN SCALES - GENERAL: PAINLEVEL: NO PAIN (0)

## 2023-11-21 NOTE — PROGRESS NOTES
SUBJECTIVE:   Leah is a 38 year old, presenting for the following:  Physical        11/21/2023    10:07 AM   Additional Questions   Roomed by Kamaljit       Healthy Habits:     Getting at least 3 servings of Calcium per day:  Yes    Bi-annual eye exam:  Yes    Dental care twice a year:  Yes    Sleep apnea or symptoms of sleep apnea:  Daytime drowsiness    Diet:  Gluten-free/reduced    Frequency of exercise:  1 day/week    Duration of exercise:  Less than 15 minutes    Taking medications regularly:  Yes    Medication side effects:  None    Additional concerns today:  No    PROBLEMS TO ADD ON...  Wants a breast exam today.  No new lumps or bumps.  We took her IUD out last month and her first menses was terrible, she spotted for a week after reticulate IUD out, her first menses she bled for 6 days 3-4 were extremely heavy with large clots and bad cramping.  She missed 1-1/2 days of work because of that.  She is waiting to see what the next menses brings.  I talked her earlier after taking the IUD out that if she goes back to having the heavy painful periods that we can refer her to GYN to discuss definitive treatment with a hysterectomy.  She will keep me posted.    She is also having issues with weight loss and keeping the weight off.  She will lose 15 pounds and then gained it back within 2 to 3-week timeframe.  She is really interested in looking into a weight loss program.  We discussed the comprehensive weight loss program at the [facility] and she would be interested in signing up for that.  She is due for some lab work so we will get that ordered today also.      Social History     Tobacco Use    Smoking status: Never    Smokeless tobacco: Never   Substance Use Topics    Alcohol use: Yes     Comment: socially- very minimal             11/21/2023    10:04 AM   Alcohol Use   Prescreen: >3 drinks/day or >7 drinks/week? No     Reviewed orders with patient.  Reviewed health maintenance and updated orders  accordingly - Yes  Lab work is in process  Labs reviewed in EPIC  BP Readings from Last 3 Encounters:   11/21/23 118/80   10/05/23 118/74   09/20/23 128/88    Wt Readings from Last 3 Encounters:   11/21/23 119.3 kg (263 lb)   10/05/23 119.4 kg (263 lb 3.2 oz)   09/20/23 118.9 kg (262 lb 2 oz)                  Patient Active Problem List   Diagnosis    Gluten intolerance    Obesity (BMI 35.0-39.9 without comorbidity)    Seasonal allergic rhinitis, unspecified chronicity, unspecified trigger    Sprain of neck    Segmental dysfunction of cervical region    Segmental dysfunction of thoracic region    Tension headache    Segmental dysfunction of sacral region    Segmental dysfunction of lumbar region    Lumbago    HOLLY (generalized anxiety disorder)    Moderate major depression (H)    Injury of jaw, initial encounter (WC)    Mirena placed 3/2/2022    Closed fracture of left ankle with delayed healing    Migraine with aura and without status migrainosus, not intractable    Tail bone pain    Pelvic cramping     Past Surgical History:   Procedure Laterality Date    LAPAROSCOPIC SALPINGECTOMY Bilateral 8/12/2019    Procedure: SALPINGECTOMY, LAPAROSCOPIC BILATERAL;  Surgeon: Dwayne Campos MD;  Location: [facility] OR    NO HISTORY OF SURGERY         Social History     Tobacco Use    Smoking status: Never    Smokeless tobacco: Never   Substance Use Topics    Alcohol use: Yes     Comment: socially- very minimal     Family History   Problem Relation Age of Onset    Arthritis Mother     No Known Problems Father     No Known Problems Maternal Grandmother     Diabetes Maternal Grandfather         Type II    Hyperlipidemia Paternal Grandmother     No Known Problems Paternal Grandfather     Depression Brother         6 yrs younger    Anxiety Disorder Brother         on meds    No Known Problems Son     No Known Problems Son          Current Outpatient Medications   Medication Sig Dispense Refill    propranolol ER (INDERAL LA) 60 MG 24  hr capsule Take 1 capsule (60 mg) by mouth daily 90 capsule 3    sertraline (ZOLOFT) 50 MG tablet Take 1.5 tablets (75 mg) by mouth daily 1/2 tablet (25 mg) nightly for 2 wks then increase to a full tablet (50 mg) nightly 135 tablet 3    aspirin (ASA) 325 MG EC tablet Take 325 mg by mouth (Patient not taking: Reported on 9/20/2023)      dextromethorphan-guaiFENesin (MUCINEX DM)  MG 12 hr tablet Take 1 tablet by mouth every 12 hours (Patient not taking: Reported on 11/21/2023)      fluticasone (FLONASE) 50 MCG/ACT nasal spray Spray 2 sprays into both nostrils daily (Patient not taking: Reported on 11/21/2023) 16 g 3    ibuprofen (ADVIL/MOTRIN) 200 MG capsule Take 200 mg by mouth every 4 hours as needed for fever (Patient not taking: Reported on 11/21/2023)      levonorgestrel (MIRENA) 20 MCG/24HR IUD 1 each (20 mcg) by Intrauterine route once (Patient not taking: Reported on 11/21/2023)      loratadine (CLARITIN) 10 MG tablet Take 10 mg by mouth daily (Patient not taking: Reported on 11/21/2023)      ondansetron (ZOFRAN ODT) 4 MG ODT tab Take 8 mg by mouth (Patient not taking: Reported on 9/20/2023)       No Known Allergies  Recent Labs   Lab Test 09/19/20  0938 01/31/20  1329 09/17/19  0925 09/24/18  1809   LDL 81  --   --   --    HDL 56  --   --   --    TRIG 94  --   --   --    ALT  --  39  --   --    CR  --  0.74  --   --    GFRESTIMATED  --  >90  --   --    GFRESTBLACK  --  >90  --   --    POTASSIUM  --  3.8  --   --    TSH  --   --  1.28 1.25        Breast Cancer Screening:        3/1/2022     4:02 PM   Breast CA Risk Assessment (FHS-7)   Do you have a family history of breast, colon, or ovarian cancer? No / Unknown         Patient under 40 years of age: Routine Mammogram Screening not recommended.   Pertinent mammograms are reviewed under the imaging tab.    History of abnormal Pap smear: NO - age 30-65 PAP every 5 years with negative HPV co-testing recommended      Latest Ref Rng & Units 3/2/2022      7:35 AM 2017     4:25 PM 2017     3:49 PM   PAP / HPV   PAP  Negative for Intraepithelial Lesion or Malignancy (NILM)      PAP (Historical)   NIL     HPV 16 DNA Negative Negative   Negative    HPV 18 DNA Negative Negative   Negative    Other HR HPV Negative Negative   Negative      Reviewed and updated as needed this visit by clinical staff   Tobacco  Allergies  Meds              Reviewed and updated as needed this visit by Provider                 Past Medical History:   Diagnosis Date    Closed fracture of left ankle with delayed healing 2021    Dislocation of symphysis pubis, initial encounter 2017    Mirena placed 3/2/2022 2021    NO ACTIVE PROBLEMS (aka NONE)       Past Surgical History:   Procedure Laterality Date    LAPAROSCOPIC SALPINGECTOMY Bilateral 2019    Procedure: SALPINGECTOMY, LAPAROSCOPIC BILATERAL;  Surgeon: Dwayne Campos MD;  Location:  OR    NO HISTORY OF SURGERY       OB History    Para Term  AB Living   3 3 3 0 0 2   SAB IAB Ectopic Multiple Live Births   0 0 0 0 2      # Outcome Date GA Lbr Marlo/2nd Weight Sex Delivery Anes PTL Lv   3 Term 19 39w6d 03:15 / 00:11 3.11 kg (6 lb 13.7 oz) F Vag-Spont EPI N JESSIE      Name: PEYTON,FEMALE-ELIDA      Apgar1: 9  Apgar5: 9   2 Term 17 40w4d 06:00 / 00:44 3.147 kg (6 lb 15 oz) M Vag-Spont EPI N JESSIE      Name: Kenton      Apgar1: 9  Apgar5: 9   1 Term 05/08/15 40w0d  3.317 kg (7 lb 5 oz) M   N       Name: Tabby      Obstetric Comments   JACKELIN:5/7/15 by 8 4/7 week ultrasound    to Demetrio.  This will be their first delivery at [facility].       Review of Systems   Constitutional:  Negative for chills and fever.   HENT:  Negative for congestion, ear pain, hearing loss and sore throat.    Eyes:  Negative for pain and visual disturbance.   Respiratory:  Negative for cough and shortness of breath.    Cardiovascular:  Negative for chest pain, palpitations and peripheral edema.  "  Gastrointestinal:  Negative for abdominal pain, constipation, diarrhea, heartburn, hematochezia and nausea.   Breasts:  Negative for tenderness, breast mass and discharge.   Genitourinary:  Positive for menstrual problem. Negative for dysuria, frequency, genital sores, hematuria, pelvic pain, urgency, vaginal bleeding and vaginal discharge.   Musculoskeletal:  Negative for arthralgias, joint swelling and myalgias.   Skin:  Negative for rash.   Neurological:  Negative for dizziness, weakness, headaches and paresthesias.   Psychiatric/Behavioral:  Negative for mood changes. The patient is not nervous/anxious.         OBJECTIVE:   /80   Pulse 96   Temp 97  F (36.1  C) (Temporal)   Resp 20   Ht 1.753 m (5' 9\")   Wt 119.3 kg (263 lb)   LMP 10/25/2023 (Exact Date)   SpO2 98%   BMI 38.84 kg/m    Physical Exam  GENERAL: healthy, alert and no distress  EYES: Eyes grossly normal to inspection, PERRL and conjunctivae and sclerae normal  HENT: ear canals and TM's normal, nose and mouth without ulcers or lesions  NECK: no adenopathy, no asymmetry, masses, or scars and thyroid normal to palpation  RESP: lungs clear to auscultation - no rales, rhonchi or wheezes  BREAST: normal without masses, tenderness or nipple discharge and no palpable axillary masses or adenopathy  CV: regular rate and rhythm, normal S1 S2, no S3 or S4, no murmur, click or rub, no peripheral edema and peripheral pulses strong  ABDOMEN: soft, nontender, no hepatosplenomegaly, no masses and bowel sounds normal   (female): Exam deferred, patient not due for a pap smear and she is without complaints or concerns today.   MS: no gross musculoskeletal defects noted, no edema  SKIN: no suspicious lesions or rashes  NEURO: Normal strength and tone, mentation intact and speech normal  PSYCH: mentation appears normal, affect normal/bright    Diagnostic Test Results:  Labs reviewed in Epic  Labs drawn today.     ASSESSMENT/PLAN:   (Z00.01) Encounter for " routine adult medical exam with abnormal findings  (primary encounter diagnosis)  Comment: Adult female physical.  Plan: Overall doing well she has had a couple issues noted below.    (Z78.9) Hepatitis B vaccination status unknown  Comment: We do not know her hepatitis B status, she has never had hepatitis because her core antibody was negative  Plan: Hepatitis B Surface Antibody        Will go ahead and draw her surface antibody today to see if she is positive or not.  If not she will go ahead and complete her series of hepatitis B vaccines.    (R73.09) Elevated glucose  Comment: She has had previously elevated glucose we will need to screen that again today  Plan: Glucose        Labs drawn.    (N94.6) Dysmenorrhea  (N92.0) Menorrhagia with regular cycle  Comment: IUD has been out for just over a month and her first menses was terrible with heavy painful cramping for 6 days.  The first 3 to 3-1/2 days were the worst.  Plan: Will see what happens over this next cycle and if it continues then we will do a referral to GYN to discuss definitive treatment.    (E66.09,  Z68.38) Class 2 obesity due to excess calories without serious comorbidity with body mass index (BMI) of 38.0 to 38.9 in adult  Comment: difficult time losing and keeping weight off   Plan: Adult Comprehensive Weight Management         Referral        Patient is agreeable to be referred for the adult comprehensive weight management program at the [facility].       Patient has been advised of split billing requirements and indicates understanding: Yes      COUNSELING:  Reviewed preventive health counseling, as reflected in patient instructions       Regular exercise       Healthy diet/nutrition       Vision screening       Immunizations  Declined: Covid-19 and Influenza due to just not wanting them             Alcohol Use        She reports that she has never smoked. She has never used smokeless tobacco.            Electronically signed  by:  Dwayne Campos M.D.  11/21/2023

## 2023-12-04 ENCOUNTER — E-VISIT (OUTPATIENT)
Dept: FAMILY MEDICINE | Facility: CLINIC | Age: 38
End: 2023-12-04
Payer: COMMERCIAL

## 2023-12-04 DIAGNOSIS — J01.90 ACUTE SINUSITIS, RECURRENCE NOT SPECIFIED, UNSPECIFIED LOCATION: Primary | ICD-10-CM

## 2023-12-04 PROCEDURE — 99422 OL DIG E/M SVC 11-20 MIN: CPT | Performed by: FAMILY MEDICINE

## 2023-12-05 RX ORDER — CEPHALEXIN 500 MG/1
500 CAPSULE ORAL 2 TIMES DAILY
Qty: 20 CAPSULE | Refills: 0 | Status: SHIPPED | OUTPATIENT
Start: 2023-12-05 | End: 2023-12-15

## 2024-02-20 ENCOUNTER — MYC MEDICAL ADVICE (OUTPATIENT)
Dept: FAMILY MEDICINE | Facility: CLINIC | Age: 39
End: 2024-02-20
Payer: COMMERCIAL

## 2024-02-20 DIAGNOSIS — T75.3XXD MOTION SICKNESS, SUBSEQUENT ENCOUNTER: Primary | ICD-10-CM

## 2024-02-20 RX ORDER — ONDANSETRON 4 MG/1
4 TABLET, ORALLY DISINTEGRATING ORAL EVERY 6 HOURS PRN
Qty: 30 TABLET | Refills: 1 | Status: SHIPPED | OUTPATIENT
Start: 2024-02-20

## 2024-02-20 RX ORDER — SCOLOPAMINE TRANSDERMAL SYSTEM 1 MG/1
1 PATCH, EXTENDED RELEASE TRANSDERMAL
Qty: 4 PATCH | Refills: 1 | Status: SHIPPED | OUTPATIENT
Start: 2024-02-20

## 2024-03-02 ENCOUNTER — E-VISIT (OUTPATIENT)
Dept: FAMILY MEDICINE | Facility: CLINIC | Age: 39
End: 2024-03-02
Payer: COMMERCIAL

## 2024-03-02 ENCOUNTER — MYC MEDICAL ADVICE (OUTPATIENT)
Dept: FAMILY MEDICINE | Facility: CLINIC | Age: 39
End: 2024-03-02

## 2024-03-02 DIAGNOSIS — H57.9 EYE PROBLEM: Primary | ICD-10-CM

## 2024-03-02 PROCEDURE — 99207 PR NON-BILLABLE SERV PER CHARTING: CPT | Performed by: FAMILY MEDICINE

## 2024-10-14 ENCOUNTER — HOSPITAL ENCOUNTER (OUTPATIENT)
Dept: GENERAL RADIOLOGY | Facility: CLINIC | Age: 39
Discharge: HOME | End: 2024-10-14
Payer: COMMERCIAL

## 2024-10-14 ENCOUNTER — VIRTUAL VISIT (OUTPATIENT)
Dept: FAMILY MEDICINE | Facility: CLINIC | Age: 39
End: 2024-10-14
Payer: COMMERCIAL

## 2024-10-14 ENCOUNTER — LAB (OUTPATIENT)
Dept: FAMILY MEDICINE | Facility: CLINIC | Age: 39
End: 2024-10-14
Payer: COMMERCIAL

## 2024-10-14 DIAGNOSIS — R05.1 ACUTE COUGH: ICD-10-CM

## 2024-10-14 DIAGNOSIS — J18.9 PNEUMONIA DUE TO INFECTIOUS ORGANISM, UNSPECIFIED LATERALITY, UNSPECIFIED PART OF LUNG: ICD-10-CM

## 2024-10-14 DIAGNOSIS — R05.1 ACUTE COUGH: Primary | ICD-10-CM

## 2024-10-14 LAB
FLUAV AG SPEC QL IA: NEGATIVE
FLUBV AG SPEC QL IA: NEGATIVE
SARS-COV-2 RNA RESP QL NAA+PROBE: NEGATIVE

## 2024-10-14 PROCEDURE — 71046 X-RAY EXAM CHEST 2 VIEWS: CPT

## 2024-10-14 PROCEDURE — 99207 PR NO CHARGE LOS: CPT

## 2024-10-14 PROCEDURE — 87635 SARS-COV-2 COVID-19 AMP PRB: CPT | Mod: 95

## 2024-10-14 PROCEDURE — 99213 OFFICE O/P EST LOW 20 MIN: CPT | Mod: 95

## 2024-10-14 PROCEDURE — 87804 INFLUENZA ASSAY W/OPTIC: CPT | Mod: 95

## 2024-10-14 RX ORDER — AZITHROMYCIN 250 MG/1
TABLET, FILM COATED ORAL
Qty: 6 TABLET | Refills: 0 | Status: SHIPPED | OUTPATIENT
Start: 2024-10-14 | End: 2024-10-19

## 2024-10-14 RX ORDER — BENZONATATE 200 MG/1
200 CAPSULE ORAL 3 TIMES DAILY PRN
Qty: 30 CAPSULE | Refills: 0 | Status: SHIPPED | OUTPATIENT
Start: 2024-10-14

## 2024-10-14 ASSESSMENT — ENCOUNTER SYMPTOMS
COUGH: 1
FATIGUE: 1

## 2024-10-14 ASSESSMENT — PATIENT HEALTH QUESTIONNAIRE - PHQ9
SUM OF ALL RESPONSES TO PHQ QUESTIONS 1-9: 2
10. IF YOU CHECKED OFF ANY PROBLEMS, HOW DIFFICULT HAVE THESE PROBLEMS MADE IT FOR YOU TO DO YOUR WORK, TAKE CARE OF THINGS AT HOME, OR GET ALONG WITH OTHER PEOPLE: NOT DIFFICULT AT ALL
SUM OF ALL RESPONSES TO PHQ QUESTIONS 1-9: 2

## 2024-10-14 NOTE — PATIENT INSTRUCTIONS
Assessment  - Patient presents with cough, pharyngitis, fever with chills, fatigue, and myalgia. Symptoms onset last Monday with pharyngitis and have since progressed.  - Patient's son recently had pneumonia, raising the possibility of a similar infection.  - Despite negative at-home streptococcal pharyngitis and SARS-CoV-2 tests, the persistence and severity of symptoms suggest the need for further diagnostic evaluation.  - Differential diagnoses include:  - Viral infection: Given the clinical presentation and negative streptococcal pharyngitis test, a viral etiology could be considered.  - Pneumonia: Given the recent history of pneumonia in the household and the patient's symptoms, pneumonia is a possibility.  - COVID-19: Despite a negative SARS-CoV-2 test earlier in the week, COVID-19 cannot be excluded given the symptomatology and the current pandemic context.    Plan  - Order a chest radiograph to evaluate for pneumonia.  - Perform in-clinic SARS-CoV-2 and influenza testing to rule out these infections.  - If COVID-19 is confirmed, treatment with Paxlovid, an antiviral, is an option. Patient expressed interest in this treatment if necessary.  - Prescribe Tessalon (benzonatate), an antitussive, to manage the cough. This can be taken up to three times a day.  - Based on the results of the chest radiograph and swabs, further treatment may be indicated, such as an antiviral or antibiotic.  - Patient to schedule an appointment for laboratory tests and chest radiograph at the [facility].  - If all tests return normal, the likely diagnosis is a viral infection. In this case, recommend rest, hydration, and over-the-counter pain relief.  - If symptoms worsen or hemoptysis occurs, an immediate visit to the emergency department is advised.  - Results will be communicated via my chart.    Treating a cough depends on its underlying cause. Here are some general approaches to alleviate cough symptoms:    Stay Hydrated:  Drink plenty of fluids, such as water, herbal tea, or clear broths. Staying hydrated helps thin mucus and soothe the throat.    Humidify the Air: Use a cool mist humidifier or take a steamy shower to add moisture to the air. This can help relieve dryness and irritation in the throat and airways.    Cough Drops or Hard Candy: Sucking on throat lozenges or hard candy can increase saliva production, which helps soothe an irritated throat and reduce the urge to cough.    Honey: Honey has natural soothing properties and can help relieve cough symptoms, especially in children over the age of one year. Mix honey with warm water or tea, or simply consume a spoonful as needed.    Over-the-Counter Cough Medications: Depending on the type of cough (dry or productive), over-the-counter cough suppressants (e.g., dextromethorphan) or expectorants (e.g., guaifenesin) may provide relief. Follow dosage instructions carefully.    Elevate Your Head: Prop yourself up with pillows while sleeping to reduce coughing at night, especially if the cough is due to post-nasal drip.    Avoid Irritants: Avoid exposure to smoke, strong odors, and other airborne irritants that can trigger or worsen cough symptoms.    Steam Inhalation: Inhaling steam from a bowl of hot water or using a steam inhaler can help loosen mucus and relieve congestion in the airways.    Rest and Relaxation: Getting adequate rest and reducing stress can support the body's immune system and help speed up recovery from coughing due to viral infections.    Consult a Healthcare Provider: If your cough persists for more than a few weeks, is severe, or is accompanied by other concerning symptoms such as fever, chest pain, or difficulty breathing, it's important to seek medical evaluation. These could be signs of a more serious condition that requires medical attention.    Always consult with a healthcare provider before giving cough medications to children, especially younger  children, to ensure safe and appropriate use. They can also help determine the underlying cause of your cough and recommend specific treatments tailored to your condition.    Treatment for viral illnesses primarily focuses on symptom management, supportive care, and sometimes antiviral medications, depending on the specific virus and the severity of the illness. Here s a general approach to treating common viral illnesses:    1. Symptomatic Treatment  Fever and Pain  Antipyretics and Analgesics:  Acetaminophen (Tylenol): Commonly used to reduce fever and relieve pain.  Ibuprofen (Advil, Motrin): An alternative to acetaminophen, also provides anti-inflammatory effects.  Cough and Congestion  Hydration: Drink plenty of fluids to help thin mucus.  Humidifiers: Use a cool-mist humidifier to moisten the airways.  Saline Nasal Spray: Helps relieve nasal congestion.  Cough Suppressants:  Dextromethorphan: For dry, non-productive coughs.  Expectorants:  Guaifenesin: Helps thin and loosen mucus in the airways.  Sore Throat  Saltwater Gargles: Gargle with warm salt water several times a day.  Lozenges and Sprays: Can provide temporary relief.  Honey and Warm Liquids: Soothe the throat.  2. Rest and Recovery  Rest: Ensure adequate rest to help the body fight off the virus.  Nutrition: Eat a balanced diet to support the immune system.  Avoid Strenuous Activity: Until symptoms significantly improve.  3. Antiviral Medications  Influenza:  Oseltamivir (Tamiflu): Effective if started within 48 hours of symptom onset.  Zanamivir (Relenza): Inhaled antiviral, also effective if started early.  Herpes Simplex Virus (HSV):  Acyclovir, Valacyclovir, Famciclovir: Used for herpes simplex and varicella-zoster (chickenpox and shingles).  COVID-19:  Antiviral Treatments: Such as remdesivir, and other specific treatments as recommended by health authorities.  Hepatitis:  Hepatitis C: Direct-acting antivirals (DAAs) like sofosbuvir.  Hepatitis  B: Antivirals like tenofovir and entecavir.  4. Hydration  Fluids: Drink plenty of water, herbal teas, broths, and other fluids to stay hydrated.  Oral Rehydration Solutions: If dehydration is a concern, especially in children or those with severe diarrhea.  5. Preventing Spread  Isolation: Stay home and avoid contact with others, especially those who are vulnerable.  Hygiene: Practice good hand hygiene and use tissues or elbows to cover coughs and sneezes.  Disinfection: Regularly clean frequently-touched surfaces.  6. Specific Virus Management  Common Cold: Mainly supportive care as mentioned above; no specific antiviral treatment.  Gastroenteritis (e.g., Norovirus): Focus on rehydration and electrolyte balance.  Respiratory Syncytial Virus (RSV): Supportive care; in severe cases, hospital management might include oxygen therapy.  7. Medical Attention  Seek Medical Help: If symptoms are severe, persistent, or if there are concerns about complications, especially in high-risk groups such as young children, the elderly, pregnant women, or those with underlying health conditions.  Warning Signs: High fever, difficulty breathing, chest pain, confusion, persistent vomiting, or signs of dehydration.  8. Prevention  Vaccination: Keep up with recommended vaccines, such as the flu vaccine, COVID-19 vaccine, and others as advised by healthcare providers.  Hand Hygiene: Regular hand washing with soap and water.  Avoiding Close Contact: With sick individuals, especially during outbreaks.  Proper management of viral illnesses involves supportive care to relieve symptoms and, when applicable, the use of antiviral medications to reduce the severity and duration of the illness. Always consult a healthcare provider for specific guidance and treatment tailored to the individual s condition.                [place]    Patient understood and verbally consented to the treatment plan. Discussed symptoms that would warrant an urgent or  emergent visit. All of the patients' questions were answered. Patient was instructed to contact the clinic if questions or concerns arise. Recommend follow up appointments if symptoms worsen or fail to improve. Recommend follow up as needed. Recommend ER in the case of an emergency.    Kailee Yin PA-C    Please note: Voice recognition software may have been used in preparing this note, unintended word substitutions may be present.

## 2024-10-14 NOTE — RESULT ENCOUNTER NOTE
Hello -    Here are my comments about the recent results. Antibiotics were sent to your pharmacy. Schedule a follow up with me in 2 weeks to check your lungs and repeat xray     Rounded opacity in the right upper lobe could represent  pneumonia. Follow-up after treatment is recommended to ensure  resolution and exclude an underlying lung mass. The left lung is  clear. No pleural effusions. Heart size and pulmonary vascularity are  within normal limits.      Please let us know if you have any questions or concerns.    Regards,  Kailee Yin PA-C

## 2024-10-14 NOTE — RESULT ENCOUNTER NOTE
Hello -    Here are my comments about the recent results.  Your flu test is negative.    Please let us know if you have any questions or concerns.    Regards,  Kailee Yin PA-C

## 2024-10-14 NOTE — PROGRESS NOTES
"Leah is a 39 year old who is being evaluated via a billable video visit.    How would you like to obtain your AVS? MyChart  If the video visit is dropped, the invitation should be resent by: Text to cell phone: [phone]  Will anyone else be joining your video visit? No      Assessment & Plan     Acute cough  - XR Chest 2 Views; Future  - Symptomatic COVID-19 Virus (Coronavirus) by PCR  - Influenza A & B Antigen - Clinic Collect  - benzonatate (TESSALON) 200 MG capsule; Take 1 capsule (200 mg) by mouth 3 times daily as needed for cough.      I spent a total of 20 minutes on the day of the visit.   Time spent by me doing chart review, history and exam, documentation and further activities per the note      BMI  Estimated body mass index is 38.84 kg/m  as calculated from the following:    Height as of 11/21/23: 1.753 m (5' 9\").    Weight as of 11/21/23: 119.3 kg (263 lb).         See Patient Instructions  Patient Instructions   Assessment  - Patient presents with cough, pharyngitis, fever with chills, fatigue, and myalgia. Symptoms onset last Monday with pharyngitis and have since progressed.  - Patient's son recently had pneumonia, raising the possibility of a similar infection.  - Despite negative at-home streptococcal pharyngitis and SARS-CoV-2 tests, the persistence and severity of symptoms suggest the need for further diagnostic evaluation.  - Differential diagnoses include:  - Viral infection: Given the clinical presentation and negative streptococcal pharyngitis test, a viral etiology could be considered.  - Pneumonia: Given the recent history of pneumonia in the household and the patient's symptoms, pneumonia is a possibility.  - COVID-19: Despite a negative SARS-CoV-2 test earlier in the week, COVID-19 cannot be excluded given the symptomatology and the current pandemic context.    Plan  - Order a chest radiograph to evaluate for pneumonia.  - Perform in-clinic SARS-CoV-2 and influenza testing to rule out " these infections.  - If COVID-19 is confirmed, treatment with Paxlovid, an antiviral, is an option. Patient expressed interest in this treatment if necessary.  - Prescribe Tessalon (benzonatate), an antitussive, to manage the cough. This can be taken up to three times a day.  - Based on the results of the chest radiograph and swabs, further treatment may be indicated, such as an antiviral or antibiotic.  - Patient to schedule an appointment for laboratory tests and chest radiograph at the [facility].  - If all tests return normal, the likely diagnosis is a viral infection. In this case, recommend rest, hydration, and over-the-counter pain relief.  - If symptoms worsen or hemoptysis occurs, an immediate visit to the emergency department is advised.  - Results will be communicated via my chart.    Treating a cough depends on its underlying cause. Here are some general approaches to alleviate cough symptoms:    Stay Hydrated: Drink plenty of fluids, such as water, herbal tea, or clear broths. Staying hydrated helps thin mucus and soothe the throat.    Humidify the Air: Use a cool mist humidifier or take a steamy shower to add moisture to the air. This can help relieve dryness and irritation in the throat and airways.    Cough Drops or Hard Candy: Sucking on throat lozenges or hard candy can increase saliva production, which helps soothe an irritated throat and reduce the urge to cough.    Honey: Honey has natural soothing properties and can help relieve cough symptoms, especially in children over the age of one year. Mix honey with warm water or tea, or simply consume a spoonful as needed.    Over-the-Counter Cough Medications: Depending on the type of cough (dry or productive), over-the-counter cough suppressants (e.g., dextromethorphan) or expectorants (e.g., guaifenesin) may provide relief. Follow dosage instructions carefully.    Elevate Your Head: Prop yourself up with pillows while sleeping to reduce  coughing at night, especially if the cough is due to post-nasal drip.    Avoid Irritants: Avoid exposure to smoke, strong odors, and other airborne irritants that can trigger or worsen cough symptoms.    Steam Inhalation: Inhaling steam from a bowl of hot water or using a steam inhaler can help loosen mucus and relieve congestion in the airways.    Rest and Relaxation: Getting adequate rest and reducing stress can support the body's immune system and help speed up recovery from coughing due to viral infections.    Consult a Healthcare Provider: If your cough persists for more than a few weeks, is severe, or is accompanied by other concerning symptoms such as fever, chest pain, or difficulty breathing, it's important to seek medical evaluation. These could be signs of a more serious condition that requires medical attention.    Always consult with a healthcare provider before giving cough medications to children, especially younger children, to ensure safe and appropriate use. They can also help determine the underlying cause of your cough and recommend specific treatments tailored to your condition.    Treatment for viral illnesses primarily focuses on symptom management, supportive care, and sometimes antiviral medications, depending on the specific virus and the severity of the illness. Here s a general approach to treating common viral illnesses:    1. Symptomatic Treatment  Fever and Pain  Antipyretics and Analgesics:  Acetaminophen (Tylenol): Commonly used to reduce fever and relieve pain.  Ibuprofen (Advil, Motrin): An alternative to acetaminophen, also provides anti-inflammatory effects.  Cough and Congestion  Hydration: Drink plenty of fluids to help thin mucus.  Humidifiers: Use a cool-mist humidifier to moisten the airways.  Saline Nasal Spray: Helps relieve nasal congestion.  Cough Suppressants:  Dextromethorphan: For dry, non-productive coughs.  Expectorants:  Guaifenesin: Helps thin and loosen mucus in  the airways.  Sore Throat  Saltwater Gargles: Gargle with warm salt water several times a day.  Lozenges and Sprays: Can provide temporary relief.  Honey and Warm Liquids: Soothe the throat.  2. Rest and Recovery  Rest: Ensure adequate rest to help the body fight off the virus.  Nutrition: Eat a balanced diet to support the immune system.  Avoid Strenuous Activity: Until symptoms significantly improve.  3. Antiviral Medications  Influenza:  Oseltamivir (Tamiflu): Effective if started within 48 hours of symptom onset.  Zanamivir (Relenza): Inhaled antiviral, also effective if started early.  Herpes Simplex Virus (HSV):  Acyclovir, Valacyclovir, Famciclovir: Used for herpes simplex and varicella-zoster (chickenpox and shingles).  COVID-19:  Antiviral Treatments: Such as remdesivir, and other specific treatments as recommended by health authorities.  Hepatitis:  Hepatitis C: Direct-acting antivirals (DAAs) like sofosbuvir.  Hepatitis B: Antivirals like tenofovir and entecavir.  4. Hydration  Fluids: Drink plenty of water, herbal teas, broths, and other fluids to stay hydrated.  Oral Rehydration Solutions: If dehydration is a concern, especially in children or those with severe diarrhea.  5. Preventing Spread  Isolation: Stay home and avoid contact with others, especially those who are vulnerable.  Hygiene: Practice good hand hygiene and use tissues or elbows to cover coughs and sneezes.  Disinfection: Regularly clean frequently-touched surfaces.  6. Specific Virus Management  Common Cold: Mainly supportive care as mentioned above; no specific antiviral treatment.  Gastroenteritis (e.g., Norovirus): Focus on rehydration and electrolyte balance.  Respiratory Syncytial Virus (RSV): Supportive care; in severe cases, hospital management might include oxygen therapy.  7. Medical Attention  Seek Medical Help: If symptoms are severe, persistent, or if there are concerns about complications, especially in high-risk groups such  as young children, the elderly, pregnant women, or those with underlying health conditions.  Warning Signs: High fever, difficulty breathing, chest pain, confusion, persistent vomiting, or signs of dehydration.  8. Prevention  Vaccination: Keep up with recommended vaccines, such as the flu vaccine, COVID-19 vaccine, and others as advised by healthcare providers.  Hand Hygiene: Regular hand washing with soap and water.  Avoiding Close Contact: With sick individuals, especially during outbreaks.  Proper management of viral illnesses involves supportive care to relieve symptoms and, when applicable, the use of antiviral medications to reduce the severity and duration of the illness. Always consult a healthcare provider for specific guidance and treatment tailored to the individual s condition.                [place]    Patient understood and verbally consented to the treatment plan. Discussed symptoms that would warrant an urgent or emergent visit. All of the patients' questions were answered. Patient was instructed to contact the clinic if questions or concerns arise. Recommend follow up appointments if symptoms worsen or fail to improve. Recommend follow up as needed. Recommend ER in the case of an emergency.    Kailee Yin PA-C    Please note: Voice recognition software may have been used in preparing this note, unintended word substitutions may be present.      Lucille Lynn is a 39 year old, presenting for the following health issues:  Generalized Body Aches, Chills, hot and cold flashes, Fatigue, Cough, and pneumonia exposure        10/14/2024     8:03 AM   Additional Questions   Roomed by Mary         10/14/2024     8:03 AM   Patient Reported Additional Medications   Patient reports taking the following new medications none     History of Present Illness       Reason for visit:  Pneumonia exposure, cold like symptoms  Symptom onset:  1-2 weeks ago  Symptoms include:  Body aches, chills, hot and  cold flashes, fatigue, cough, sore throat  Symptom intensity:  Moderate  Symptom progression:  Worsening  Had these symptoms before:  Yes  Has tried/received treatment for these symptoms:  No  What makes it worse:  Not keeping up on the ibuprofen (taking 800mg every 6 hours), activity  What makes it better:  Ibuprofen   She is taking medications regularly.   VIDEO Body aches, chills and hot and cold flashes, extreme exhaustion, cough, lungs hurt, whole body hurts when I cough.   Exposure to pneumonia from son.     Cough, started last Monday ST, strepp negatove, throat was tender, Friday night hto and cold, pools of sweat in bed, cold and teeth chateraing, head feels heacy, cough hurts lung and head, son had pneumonia for 14 days. 99.9, not abdove 100. Energy is really low. No vaccines for covid or flu. Covid test was negative. Little bit of wehzzing. Hard to take a deep breaht. This am cough up phlem. Otherwise dry cough., no nasuea or vomtiing. Body aches.     Subjective  Respiratory Symptoms  Leah started experiencing a severe cough last Monday, which was accompanied by a sore throat. The sore throat was tender and inflamed, but an at-home strep test was negative. The sore throat symptoms persisted throughout the week but have since subsided. However, the cough has continued and is causing pain in her lungs and head when she coughs. She also reports a feeling of heaviness in her head. Leah has noticed a slight wheezing sound when she lies down, and it takes a lot of effort for her to take a deep breath. She coughed up a small amount of phlegm this morning, but otherwise, the cough has been dry. Her son recently had pneumonia for 14 days, and she was expecting to catch something from him.    Fever Symptoms  Leah has been experiencing hot and cold flashes since Friday night. She reports sweating profusely to the point of soaking the bed, followed by feeling extremely cold to the point of shivering. She has been  checking her temperature at home, which has not exceeded 100 degrees.    Fatigue  Leah reports extremely low energy levels. She feels tired even when walking from one end of the house to the other.    Body Aches  Leah reports experiencing body aches.    COVID-19 and Flu Vaccination Status  Leah has not received her COVID-19 or flu vaccine this year. She took a COVID-19 test earlier in the week, which was negative.    Past Medical History  Leah does not have any history of asthma or lung problems.    Medication  Leah is not currently taking any medication that would interact with Paxlovid, an antiviral treatment option if she tests positive for COVID-19.    Family History  Leah's son recently recovered from pneumonia, but still has a lingering cough.    Social History  Laeh is a mother to three children.    Objective  Test results: At-home strep test negative, COVID test earlier in the week negative    Assessment  - Patient presents with cough, pharyngitis, fever with chills, fatigue, and myalgia. Symptoms onset last Monday with pharyngitis and have since progressed.  - Patient's son recently had pneumonia, raising the possibility of a similar infection.  - Despite negative at-home streptococcal pharyngitis and SARS-CoV-2 tests, the persistence and severity of symptoms suggest the need for further diagnostic evaluation.  - Differential diagnoses include:  - Viral infection: Given the clinical presentation and negative streptococcal pharyngitis test, a viral etiology could be considered.  - Pneumonia: Given the recent history of pneumonia in the household and the patient's symptoms, pneumonia is a possibility.  - COVID-19: Despite a negative SARS-CoV-2 test earlier in the week, COVID-19 cannot be excluded given the symptomatology and the current pandemic context.    Plan  - Order a chest radiograph to evaluate for pneumonia.  - Perform in-clinic SARS-CoV-2 and influenza testing to rule out these infections.  -  If COVID-19 is confirmed, treatment with Paxlovid, an antiviral, is an option. Patient expressed interest in this treatment if necessary.  - Prescribe Tessalon (benzonatate), an antitussive, to manage the cough. This can be taken up to three times a day.  - Based on the results of the chest radiograph and swabs, further treatment may be indicated, such as an antiviral or antibiotic.  - Patient to schedule an appointment for laboratory tests and chest radiograph at the [facility].  - If all tests return normal, the likely diagnosis is a viral infection. In this case, recommend rest, hydration, and over-the-counter pain relief.  - If symptoms worsen or hemoptysis occurs, an immediate visit to the emergency department is advised.  - Results will be communicated via my chart.                  Objective           Vitals:  No vitals were obtained today due to virtual visit.    Physical Exam   GENERAL: alert and no distress  EYES: Eyes grossly normal to inspection.  No discharge or erythema, or obvious scleral/conjunctival abnormalities.  RESP: No audible wheeze or visible cyanosis.  Audible coughing heard  SKIN: Visible skin clear. No significant rash, abnormal pigmentation or lesions.  NEURO: Cranial nerves grossly intact.  Mentation and speech appropriate for age.  PSYCH: Appropriate affect, tone, and pace of words    Office Visit on 03/02/2022   Component Date Value Ref Range Status    Interpretation 03/02/2022 Negative for Intraepithelial Lesion or Malignancy (NILM)    Final    Comment 03/02/2022    Final                    Value:This result contains rich text formatting which cannot be displayed here.    Specimen Adequacy 03/02/2022 Satisfactory for evaluation, endocervical/transformation zone component present   Final    Clinical Information 03/02/2022    Final                    Value:This result contains rich text formatting which cannot be displayed here.    LMP/Menopause Date 03/02/2022    Final                     Value:This result contains rich text formatting which cannot be displayed here.    Reflex Testing 03/02/2022 Yes regardless of result   Final    Previous Abnormal? 03/02/2022    Final                    Value:This result contains rich text formatting which cannot be displayed here.    Performing Labs 03/02/2022    Final                    Value:This result contains rich text formatting which cannot be displayed here.    Hepatitis C Antibody 03/02/2022 Nonreactive  Nonreactive Final    Glucose 03/02/2022 105 (H)  70 - 99 mg/dL Final    Patient Fasting > 8hrs? 03/02/2022 Yes   Final    Other HR HPV 03/02/2022 Negative  Negative Final    HPV16 DNA 03/02/2022 Negative  Negative Final    HPV18 DNA 03/02/2022 Negative  Negative Final    FINAL DIAGNOSIS 03/02/2022    Final                    Value:This result contains rich text formatting which cannot be displayed here.     No results found for any visits on 10/14/24.  No results found.      Video-Visit Details    Type of service:  Video Visit   Originating Location (pt. Location): Home    Distant Location (provider location):  On-site  Platform used for Video Visit: Will  Signed Electronically by: Kailee Yin PA-C

## 2024-10-16 ENCOUNTER — NURSE TRIAGE (OUTPATIENT)
Dept: NURSING | Facility: CLINIC | Age: 39
End: 2024-10-16
Payer: COMMERCIAL

## 2024-10-16 ENCOUNTER — MYC MEDICAL ADVICE (OUTPATIENT)
Dept: FAMILY MEDICINE | Facility: CLINIC | Age: 39
End: 2024-10-16
Payer: COMMERCIAL

## 2024-10-16 NOTE — TELEPHONE ENCOUNTER
Nurse Triage SBAR    Is this a 2nd Level Triage? YES, LICENSED PRACTITIONER REVIEW IS REQUIRED    Situation: Pt calling with concerns for worsening pneumonia.    Background: Pt was seen in clinic on Monday and dx with pneumonia after being ill for 7 days. She was started on abx which she has been taking since Monday evening.    Assessment: Pt states she is feeling worse than she was. She is fatigued, coughing, congested, hot and cold, wheezing while laying down, low grade temps no more than 100 and abdominal pain for abx. She feels SOB with activity. Denies CP.    Protocol Recommended Disposition:   Call PCP Now    Recommendation: Provider consult indicated.     Reason for page: 2 LT    Page sent to Dr. Nieves by RN at [phone].     Provider Recommendation Follow Up: Go to the ER.    Reached patient/caregiver. Informed of provider's recommendations. Patient verbalized understanding and states she cannot go in as she does not have anyone to watch her kids. She is going to try to be seen tomorrow.    Reason for Disposition   [1] Recent medical visit within 24 hours AND [2] symptoms worse (Exception: fever worse)   [1] Taking antibiotic > 24 hours for pneumonia AND [2] breathing is worse    Additional Information   Negative: Severe difficulty breathing (e.g., struggling for each breath, speaks in single words)   Negative: Sounds like a life-threatening emergency to the triager   Negative: Difficult to awaken or acting confused (e.g., disoriented, slurred speech)   Negative: Recently discharged from hospital with diagnosis of pneumonia   Negative: Stridor   Negative: Bluish (or gray) lips or face now   Negative: Slow, shallow and weak breathing   Negative: New onset or worsening chest pain   Negative: MODERATE difficulty breathing (e.g., speaks in phrases, SOB even at rest, pulse 100-120)   Negative: Fever > 104 F (40 C)   Negative: [1] Taking antibiotic > 24 hours for pneumonia AND [2] fever > 103 F (39.4 C)   Negative:  Patient sounds very sick or weak to the triager   Negative: [1] Coughed up blood AND [2] large amount or blood clots (Exception: blood-tinged sputum)   Negative: [1] Diabetes mellitus or weak immune system (e.g., HIV positive, cancer chemo, splenectomy, organ transplant, chronic steroids) AND [2] new onset of fever > 100.0 F (37.8 C)    Protocols used: Pneumonia on Antibiotic Follow-up Call-A-AH    Yolis Sarmiento RN  [facility] Nurse Advisor   10/16/2024  6:11 PM

## 2024-10-17 ENCOUNTER — OFFICE VISIT (OUTPATIENT)
Dept: FAMILY MEDICINE | Facility: CLINIC | Age: 39
End: 2024-10-17
Payer: COMMERCIAL

## 2024-10-17 VITALS
DIASTOLIC BLOOD PRESSURE: 70 MMHG | WEIGHT: 263.25 LBS | SYSTOLIC BLOOD PRESSURE: 104 MMHG | TEMPERATURE: 97.2 F | HEART RATE: 91 BPM | HEIGHT: 69 IN | BODY MASS INDEX: 38.99 KG/M2 | OXYGEN SATURATION: 99 % | RESPIRATION RATE: 16 BRPM

## 2024-10-17 DIAGNOSIS — J01.11 ACUTE RECURRENT FRONTAL SINUSITIS: ICD-10-CM

## 2024-10-17 DIAGNOSIS — R06.02 SOB (SHORTNESS OF BREATH): Primary | ICD-10-CM

## 2024-10-17 DIAGNOSIS — J18.9 PNEUMONIA DUE TO INFECTIOUS ORGANISM, UNSPECIFIED LATERALITY, UNSPECIFIED PART OF LUNG: ICD-10-CM

## 2024-10-17 PROCEDURE — 99214 OFFICE O/P EST MOD 30 MIN: CPT

## 2024-10-17 RX ORDER — DOXYCYCLINE 100 MG/1
100 CAPSULE ORAL 2 TIMES DAILY
Qty: 20 CAPSULE | Refills: 0 | Status: SHIPPED | OUTPATIENT
Start: 2024-10-17 | End: 2024-10-27

## 2024-10-17 ASSESSMENT — PAIN SCALES - GENERAL: PAINLEVEL: NO PAIN (0)

## 2024-10-17 NOTE — PROGRESS NOTES
"  Assessment & Plan     SOB (shortness of breath)  - XR Chest 2 Views; Future  - doxycycline hyclate (VIBRAMYCIN) 100 MG capsule; Take 1 capsule (100 mg) by mouth 2 times daily for 10 days.    Pneumonia due to infectious organism, unspecified laterality, unspecified part of lung    Acute recurrent frontal sinusitis  - doxycycline hyclate (VIBRAMYCIN) 100 MG capsule; Take 1 capsule (100 mg) by mouth 2 times daily for 10 days.      I spent a total of 10 minutes on the day of the visit.   Time spent by me doing chart review, history and exam, documentation and further activities per the note      BMI  Estimated body mass index is 38.88 kg/m  as calculated from the following:    Height as of this encounter: 1.753 m (5' 9\").    Weight as of this encounter: 119.4 kg (263 lb 4 oz).         See Patient Instructions  Patient Instructions   Assessment  - Chronic sinusitis, currently experiencing symptoms of acute sinusitis  - History of recurrent otitis media  - Possible pneumonia, as indicated by previous chest x-rays  - Reports of myalgia, pulsatile cephalalgia exacerbated by coughing, chest heaviness, sternal pain, and inferior thoracic pain  - Reports of dyspnea when climbing stairs or after coughing  - Reports of fatigue and exertional dyspnea  - Reports of minimal sputum production, predominantly dry cough    Plan  - Continue taking Augmentin and Azithromycin for possible pneumonia  - Switch from Augmentin to doxycycline for treatment of possible pneumonia and sinus infection, with caution for potential side effects including gastrointestinal upset, diarrhea, and photosensitivity  - Stop taking Tessalon if not providing relief  - Start taking Mucinex (expectorant) to help loosen chest secretions  - Use Flonase nasal spray for congestion relief  - Repeat radiographic imaging in a week or two to rule out any pulmonary mass  - If condition worsens, patient to contact doctor immediately  - Patient to take precautions due to " contagious nature of pneumonia, including masking, hand washing, and disinfecting high touch surfaces            Chest xray in 1-2 weeks after symptoms are resolved    Stop Augmentin    Start doxycycline    Finish azithromycin    Musinex OTC    Stop tessalon     Flonase nasal spray    Kwendnote    Patient understood and verbally consented to the treatment plan. Discussed symptoms that would warrant an urgent or emergent visit. All of the patients' questions were answered. Patient was instructed to contact the clinic if questions or concerns arise. Recommend follow up appointments if symptoms worsen or fail to improve. Recommend follow up as needed. Recommend ER in the case of an emergency.    Kailee Yin PA-C    Please note: Voice recognition software may have been used in preparing this note, unintended word substitutions may be present.          Lucille Lynn is a 39 year old, presenting for the following health issues:  Follow Up (Pneumonia, per pt not feeling better )        10/17/2024    10:23 AM   Additional Questions   Roomed by Shantel Adkins  The patient reports no improvement in their condition. They describe a throbbing sensation in their face, suggesting a possible sinus infection. The patient also mentions that their nose is no longer producing any discharge. They report a feeling of fullness and aching in their ears, particularly when lying down. The patient has a history of chronic sinus infections and has been gluten-free for eight and a half years due to inflammation. They have had only four sinus infections in this period. The patient also has a history of frequent ear infections until their teenage years, after which they began experiencing sinus infections.    The patient is currently on Augmentin and Azithromycin for a possible pneumonia diagnosis, as well as Tessalon for cough suppression. They report that their body aches have improved, but coughing causes severe  head pain. They also describe a heavy feeling in their chest when lying down, which they did not experience before. The patient also reports pain in their sternum area and lower chest. They experience shortness of breath when climbing stairs or after a severe coughing fit. They also report feeling extremely tired and unable to catch their breath after carrying groceries.    The patient reports coughing up very little phlegm, and their cough is mostly dry. They have tried to cough up more phlegm when it feels wet, but have been unsuccessful. 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 They have not noticed any swelling in their legs. The patient is due for a repeat chest X-ray in a week or two once they feel better. They have one day left of their Azithromycin course. The patient is also a  in [place]. They have not been able to shave their legs due to their current condition.    Objective  Imaging results: Previous chest x-rays showed possible pneumonia    Assessment  - Chronic sinusitis, currently experiencing symptoms of acute sinusitis  - History of recurrent otitis media  - Possible pneumonia, as indicated by previous chest x-rays  - Reports of myalgia, pulsatile cephalalgia exacerbated by coughing, chest heaviness, sternal pain, and inferior thoracic pain  - Reports of dyspnea when climbing stairs or after coughing  - Reports of fatigue and exertional dyspnea  - Reports of minimal sputum production, predominantly dry cough    Plan  - Continue taking Augmentin and Azithromycin for possible pneumonia  - Switch from Augmentin to doxycycline for treatment of possible pneumonia and sinus infection, with caution for potential side effects including gastrointestinal upset, diarrhea, and photosensitivity  - Stop taking Tessalon if not providing relief  - Start taking Mucinex (expectorant) to help loosen chest secretions  - Use Flonase nasal spray for congestion relief  - Repeat  "radiographic imaging in a week or two to rule out any pulmonary mass  - If condition worsens, patient to contact doctor immediately  - Patient to take precautions due to contagious nature of pneumonia, including masking, hand washing, and disinfecting high touch surfaces                  Objective    /70   Pulse 91   Temp 97.2  F (36.2  C) (Temporal)   Resp 16   Ht 1.753 m (5' 9\")   Wt 119.4 kg (263 lb 4 oz)   LMP 10/01/2024 (Exact Date)   SpO2 99%   BMI 38.88 kg/m    Body mass index is 38.88 kg/m .  Physical Exam   GENERAL: alert and no distress  EYES: Eyes grossly normal to inspection, PERRL and conjunctivae and sclerae normal  HENT: ear canals and TM's normal, nose and mouth without ulcers or lesions  RESP: lungs clear to auscultation - no rales, rhonchi or wheezes  CV: regular rate and rhythm, normal S1 S2, no S3 or S4, no murmur, click or rub, no peripheral edema  MS: no gross musculoskeletal defects noted, no edema  SKIN: no suspicious lesions or rashes  NEURO: Normal strength and tone, mentation intact and speech normal  PSYCH: mentation appears normal, affect normal/bright    Virtual Visit on 10/14/2024   Component Date Value Ref Range Status    SARS CoV2 PCR 10/14/2024 Negative  Negative Final    NEGATIVE: SARS-CoV-2 (COVID-19) RNA not detected, presumed negative.    Influenza A antigen 10/14/2024 Negative  Negative Final    Influenza B antigen 10/14/2024 Negative  Negative Final     No results found for any visits on 10/17/24.  XR Chest 2 Views    Result Date: 10/14/2024  CHEST TWO VIEWS October 14, 2024 10:07 AM HISTORY: Acute cough. COMPARISON: None.     IMPRESSION: Rounded opacity in the right upper lobe could represent pneumonia. Follow-up after treatment is recommended to ensure resolution and exclude an underlying lung mass. The left lung is clear. No pleural effusions. Heart size and pulmonary vascularity are within normal limits. TAMIR HARRINGTON MD   SYSTEM ID:  [id number]     "     Signed Electronically by: Kailee Yin PA-C

## 2024-10-17 NOTE — PATIENT INSTRUCTIONS
Assessment  - Chronic sinusitis, currently experiencing symptoms of acute sinusitis  - History of recurrent otitis media  - Possible pneumonia, as indicated by previous chest x-rays  - Reports of myalgia, pulsatile cephalalgia exacerbated by coughing, chest heaviness, sternal pain, and inferior thoracic pain  - Reports of dyspnea when climbing stairs or after coughing  - Reports of fatigue and exertional dyspnea  - Reports of minimal sputum production, predominantly dry cough    Plan  - Continue taking Augmentin and Azithromycin for possible pneumonia  - Switch from Augmentin to doxycycline for treatment of possible pneumonia and sinus infection, with caution for potential side effects including gastrointestinal upset, diarrhea, and photosensitivity  - Stop taking Tessalon if not providing relief  - Start taking Mucinex (expectorant) to help loosen chest secretions  - Use Flonase nasal spray for congestion relief  - Repeat radiographic imaging in a week or two to rule out any pulmonary mass  - If condition worsens, patient to contact doctor immediately  - Patient to take precautions due to contagious nature of pneumonia, including masking, hand washing, and disinfecting high touch surfaces            Chest xray in 1-2 weeks after symptoms are resolved    Stop Augmentin    Start doxycycline    Finish azithromycin    Musinex OTC    Stop tessalon     Flonase nasal spray    Kwendnote    Patient understood and verbally consented to the treatment plan. Discussed symptoms that would warrant an urgent or emergent visit. All of the patients' questions were answered. Patient was instructed to contact the clinic if questions or concerns arise. Recommend follow up appointments if symptoms worsen or fail to improve. Recommend follow up as needed. Recommend ER in the case of an emergency.    Kailee Yin PA-C    Please note: Voice recognition software may have been used in preparing this note, unintended word substitutions  may be present.

## 2024-10-22 ENCOUNTER — PATIENT OUTREACH (OUTPATIENT)
Dept: CARE COORDINATION | Facility: CLINIC | Age: 39
End: 2024-10-22
Payer: COMMERCIAL

## 2024-10-29 ENCOUNTER — OFFICE VISIT (OUTPATIENT)
Dept: FAMILY MEDICINE | Facility: CLINIC | Age: 39
End: 2024-10-29
Payer: COMMERCIAL

## 2024-10-29 VITALS
OXYGEN SATURATION: 99 % | BODY MASS INDEX: 39.92 KG/M2 | HEART RATE: 77 BPM | TEMPERATURE: 98.8 F | DIASTOLIC BLOOD PRESSURE: 60 MMHG | RESPIRATION RATE: 16 BRPM | SYSTOLIC BLOOD PRESSURE: 102 MMHG | WEIGHT: 263.38 LBS | HEIGHT: 68 IN

## 2024-10-29 DIAGNOSIS — H69.93 DYSFUNCTION OF BOTH EUSTACHIAN TUBES: Primary | ICD-10-CM

## 2024-10-29 PROCEDURE — 99213 OFFICE O/P EST LOW 20 MIN: CPT

## 2024-10-29 RX ORDER — CETIRIZINE HYDROCHLORIDE 10 MG/1
10 TABLET ORAL DAILY
Qty: 30 TABLET | Refills: 0 | Status: SHIPPED | OUTPATIENT
Start: 2024-10-29

## 2024-10-29 RX ORDER — FLUTICASONE PROPIONATE 50 MCG
1 SPRAY, SUSPENSION (ML) NASAL DAILY
Qty: 16 G | Refills: 0 | Status: SHIPPED | OUTPATIENT
Start: 2024-10-29

## 2024-10-29 RX ORDER — OXYMETAZOLINE HYDROCHLORIDE 0.05 G/100ML
2 SPRAY NASAL 2 TIMES DAILY
Qty: 30 ML | Refills: 0 | Status: SHIPPED | OUTPATIENT
Start: 2024-10-29

## 2024-10-29 ASSESSMENT — PAIN SCALES - GENERAL: PAINLEVEL_OUTOF10: NO PAIN (0)

## 2024-10-29 NOTE — PATIENT INSTRUCTIONS
Assessment  - Eustachian tube dysfunction causing fluid accumulation in the ears, leading to congestion and hearing difficulties.  - No infection present, but need to address the tube dysfunction to alleviate symptoms.    Plan  - Use Afrin nasal spray for three days, then switch to Flonase.  - Take Zyrtec 10 mg daily.  - Use saline sinus rinses to help open eustachian tubes.  - Tylenol and Advil are recommended for pain management.  - Prescription for Afrin and Zyrtec sent to the pharmacy.  - Monitor for improvement in congestion and ear symptoms.      Eustachian tube dysfunction    Over-the-counter decongestants  Over-the-counter antihistamine such as Zyrtec  Middle ear pressure equalization maneuvers as described below.    1.  Saline sinus rinse (one form comes in a squirt bottle form while another kind is known as a Neti Pots).  Follow directions on package.  May do this 1-2 times per day.    2.  May also use Afrin (oxymetazoline) nasal spray for a maximum of 3 days for symptom control.  After 3 days, switch to Flonase nasal spray.  Do not use for longer than this.  A good idea is to use this medication with saline nasal irrigation.  If you choose to do this, use the Afrin about 30-45 minutes after the sinus rinse to maximize effect of medication.    3.  Flonase (fluticasone):  use as directed on package or prescription.  A good idea is to use this medication with saline nasal irrigation.  If you choose to do this, use the fluticasone about 30-45 minutes after the sinus rinse to maximize effect of medication.     4.  Antihistamines:  Daytime:  Zyrtec (cetirizine).  10 mg once to twice daily.    5.  Tylenol (acetaminophen) or Advil (ibuprofen) as needed for pain..     Middle ear pressure equalization maneuvers  Maneuver Description Comment   Simple techniques Yawn, swallow, jaw thrust, head tilt Useful in patients with widely patent Eustachian tubes, but usually ineffective with marginally patent Eustachian tubes  "  Frenzel maneuver Diver pinches the nostrils, closes the glottis, and makes a \"k\" or \"guh\" sound to compress air in the back of the throat Can be performed at any point in the respiratory cycle and can be quickly repeated.   Valsalva maneuver Diver pinches the nostrils, tightens cheek muscle and exhales forcefully Prolonged efforts can reduce systemic venous return and cause hypotension   Toynbee maneuver Diver pinches the nostrils while swallowing Limited effectiveness during rapid descent   Beance tubaire voluntaire maneuver Diver contracts the muscles of the soft palate while using the muscles of the upper throat to open the Eustachian tubes Difficult to teach and not easily mastered   R[place] maneuver Diver contracts the muscles of the soft palate while tensing the tongue to open the Eustachian tubes Difficult to teach and not easily mastered; concomitant jaw thrust may be helpful   Erin technique Combination of pressurization from the Frenzel or Valsalva maneuver with a jaw thrust or head tilt     [place] technique Diver pinches the nostrils, builds up pressure with a Frenzel or Valsalva maneuver, and simultaneously swallows Requires coordination and practice but is very effective   Twitch maneuver Diver builds up pressure with a Frenzel or Valsalva maneuver, then suddenly twitches the head sideways Throat muscle tension increases effectiveness of the technique   Adapted from ROSANA Salazar, [place] J 2000; 4:30.  Graphic 18096 Version 1.0    Eustachian Tube Problems: Care Instructions  Overview     The eustachian (say \"you-STAY-shee-un\") tubes connect the middle ear on each side to the back of the throat. They keep air pressure stable in the ears. If your eustachian tubes become blocked, the air pressure in your ears changes. A quick change in air pressure can cause eustachian tubes to close up. This might happen when an airplane changes altitude or when a  goes up or down underwater. And a cold can " "make the tubes swell and block the fluid in the middle ear from draining out. That can cause pain.  Eustachian tube problems often clear up on their own or after treating the cause of the blockage. If your tubes continue to be blocked, you may need surgery.  Follow-up care is a key part of your treatment and safety. Be sure to make and go to all appointments, and call your doctor if you are having problems. It's also a good idea to know your test results and keep a list of the medicines you take.  How can you care for yourself at home?  Try a simple exercise to help open blocked tubes. Close your mouth, hold your nose, and gently blow as if you are blowing your nose. Yawning and chewing gum also may help. You may hear or feel a \"pop\" when the tubes open.  To ease ear pain, apply a warm washcloth or a heating pad set on low. There may be some drainage from the ear when the heat melts earwax. Put a cloth between the heat source and your skin.  If your doctor prescribed antibiotics, take them as directed. Do not stop taking them just because you feel better. You need to take the full course of antibiotics.  Be safe with medicines. Depending on the cause of the problem, your doctor may recommend over-the-counter medicine. For example, adults may try decongestants for cold symptoms or nasal spray steroids for allergies. Follow the instructions carefully.  Be careful with cough and cold medicines. Don't give them to children younger than 6, because they don't work for children that age and can even be harmful. For children 6 and older, always follow all the instructions carefully. Make sure you know how much medicine to give and how long to use it. And use the dosing device if one is included.  When should you call for help?   Call your doctor now or seek immediate medical care if:    You develop sudden, complete hearing loss.     You have severe pain or feel dizzy.     You have new or increasing pus or blood draining " "from your ear.     You have redness, swelling, or pain around or behind the ear.   Watch closely for changes in your health, and be sure to contact your doctor if:    You do not get better after 2 weeks.     You have any new symptoms, such as itching or a feeling of fullness in the ear.        What is the Eustachian tube?  This is tube is a tube that connects the middle ear (the part of the ear behind the eardrum) to the back of the nose and throat (figure 1).  Normally, the Eustachian tube opens and closes. This helps keep the air pressure inside the middle ear the same as the air pressure outside the middle ear. If there is a problem with the tube opening, the air pressure inside the middle ear won't be the same as the air pressure outside it. This can cause ear pain, hearing loss, and other symptoms. \"Ear barotrauma\" is the medical term for when people have symptoms or damage in the middle ear because of air pressure differences.  In some people, the Eustachian tube does not close normally, but stays open all of the time. This can also cause symptoms like hearing the sound of your own voice and breathing.  Most Eustachian tube problems last only a short time and get better on their own. But they can sometimes lead to more serious conditions, such as:  ?A middle ear infection  ?A torn eardrum  ?Hearing loss  In children, long-term hearing loss from Eustachian tube problems can also lead to language or speech problems.    What causes Eustachian tube problems?  Common causes are:  ?Illnesses or conditions that make the Eustachian tubes swollen or inflamed - These include colds, allergies, ear infections, or sinus infections. The sinuses are hollow areas in the bones of the face.  ?Sudden air pressure changes - These can happen when people fly in an airplane, scuba dive, or drive up to the mountains.  ?Growths that block the Eustachian tube  ?Being born with an abnormal Eustachian tube    What are the symptoms of a " "Eustachian tube problem?  Common symptoms include:  ?Ear pain  ?Feeling pressure or fullness in the ear  ?Trouble hearing  ?Ringing in the ear  ?Feeling dizzy  ?Loud sounds of your own voice or your own breathing    Should I see a doctor or nurse?  See your doctor or nurse if your symptoms are severe, get worse, or don't go away after a few days.  Will I need tests?  Probably not. Your doctor or nurse should be able to tell if you have a Eustachian tube problem by learning about your symptoms and doing an exam.  If your symptoms are severe, last for a long time, or only affect 1 ear, your doctor or nurse might:  ?Have you see a special kind of doctor called an ear, nose, and throat (\"ENT\") doctor  ?Do tests to check your hearing  ?Do an imaging test - These create pictures of the inside of the body.    How are Eustachian tube problems treated?  Treatment depends on what's causing the problem. Depending on your individual situation, your doctor might treat you with 1 or more of the following:  ?Nose sprays  ?Antihistamines - These medicines are usually used to treat allergies. They help stop itching, sneezing, and runny nose symptoms.  ?Decongestants - These medicines can help with stuffy nose symptoms.  ?Instructions to avoid dehydration - Drink a lot of fluids, especially before doing physical activity or being in the heat.  ?Surgery - Most people do not need surgery for Eustachian tube problems. But people might need surgery if their symptoms don't get better with medicines or they have severe or long-term symptoms.  Antibiotics are not needed to treat Eustachian tube problems. But they might be needed if a person has an ear infection.    Patient understood and verbally consented to the treatment plan. Discussed symptoms that would warrant an urgent or emergent visit. All of the patients' questions were answered. Patient was instructed to contact the clinic if questions or concerns arise. Recommend follow up " appointments if symptoms worsen or fail to improve. Recommend follow up as needed. Recommend ER in the case of an emergency.    Kailee Yin PA-C    Please note: Voice recognition software may have been used in preparing this note, unintended word substitutions may be present.

## 2024-10-29 NOTE — PROGRESS NOTES
"  Assessment & Plan     Dysfunction of both eustachian tubes  - oxymetazoline (AFRIN) 0.05 % nasal spray; Spray 0.2 mLs (2 sprays) into both nostrils 2 times daily.  - fluticasone (FLONASE) 50 MCG/ACT nasal spray; Spray 1 spray into both nostrils daily.  - cetirizine (ZYRTEC) 10 MG tablet; Take 1 tablet (10 mg) by mouth daily.        I spent a total of 10 minutes on the day of the visit.   Time spent by me doing chart review, history and exam, documentation and further activities per the note    BMI  Estimated body mass index is 39.92 kg/m  as calculated from the following:    Height as of this encounter: 1.73 m (5' 8.11\").    Weight as of this encounter: 119.5 kg (263 lb 6 oz).         See Patient Instructions  Patient Instructions   Assessment  - Eustachian tube dysfunction causing fluid accumulation in the ears, leading to congestion and hearing difficulties.  - No infection present, but need to address the tube dysfunction to alleviate symptoms.    Plan  - Use Afrin nasal spray for three days, then switch to Flonase.  - Take Zyrtec 10 mg daily.  - Use saline sinus rinses to help open eustachian tubes.  - Tylenol and Advil are recommended for pain management.  - Prescription for Afrin and Zyrtec sent to the pharmacy.  - Monitor for improvement in congestion and ear symptoms.      Eustachian tube dysfunction    Over-the-counter decongestants  Over-the-counter antihistamine such as Zyrtec  Middle ear pressure equalization maneuvers as described below.    1.  Saline sinus rinse (one form comes in a squirt bottle form while another kind is known as a Neti Pots).  Follow directions on package.  May do this 1-2 times per day.    2.  May also use Afrin (oxymetazoline) nasal spray for a maximum of 3 days for symptom control.  After 3 days, switch to Flonase nasal spray.  Do not use for longer than this.  A good idea is to use this medication with saline nasal irrigation.  If you choose to do this, use the Afrin about " "30-45 minutes after the sinus rinse to maximize effect of medication.    3.  Flonase (fluticasone):  use as directed on package or prescription.  A good idea is to use this medication with saline nasal irrigation.  If you choose to do this, use the fluticasone about 30-45 minutes after the sinus rinse to maximize effect of medication.     4.  Antihistamines:  Daytime:  Zyrtec (cetirizine).  10 mg once to twice daily.    5.  Tylenol (acetaminophen) or Advil (ibuprofen) as needed for pain..     Middle ear pressure equalization maneuvers  Maneuver Description Comment   Simple techniques Yawn, swallow, jaw thrust, head tilt Useful in patients with widely patent Eustachian tubes, but usually ineffective with marginally patent Eustachian tubes   Frenzel maneuver Diver pinches the nostrils, closes the glottis, and makes a \"k\" or \"guh\" sound to compress air in the back of the throat Can be performed at any point in the respiratory cycle and can be quickly repeated.   Valsalva maneuver Diver pinches the nostrils, tightens cheek muscle and exhales forcefully Prolonged efforts can reduce systemic venous return and cause hypotension   Toynbee maneuver Diver pinches the nostrils while swallowing Limited effectiveness during rapid descent   Beance tubaire voluntaire maneuver Diver contracts the muscles of the soft palate while using the muscles of the upper throat to open the Eustachian tubes Difficult to teach and not easily mastered   [place] maneuver Diver contracts the muscles of the soft palate while tensing the tongue to open the Eustachian tubes Difficult to teach and not easily mastered; concomitant jaw thrust may be helpful   [place] technique Combination of pressurization from the Frenzel or Valsalva maneuver with a jaw thrust or head tilt     Sona technique Diver pinches the nostrils, builds up pressure with a Frenzel or Valsalva maneuver, and simultaneously swallows Requires coordination and practice but is very " "effective   Twitch maneuver Diver builds up pressure with a Frenzel or Valsalva maneuver, then suddenly twitches the head sideways Throat muscle tension increases effectiveness of the technique   Adapted from ROSANA Salazar, [place] J 2000; 4:30.  Graphic 53056 Version 1.0    Eustachian Tube Problems: Care Instructions  Overview     The eustachian (say \"you-STAY-shee-un\") tubes connect the middle ear on each side to the back of the throat. They keep air pressure stable in the ears. If your eustachian tubes become blocked, the air pressure in your ears changes. A quick change in air pressure can cause eustachian tubes to close up. This might happen when an airplane changes altitude or when a  goes up or down underwater. And a cold can make the tubes swell and block the fluid in the middle ear from draining out. That can cause pain.  Eustachian tube problems often clear up on their own or after treating the cause of the blockage. If your tubes continue to be blocked, you may need surgery.  Follow-up care is a key part of your treatment and safety. Be sure to make and go to all appointments, and call your doctor if you are having problems. It's also a good idea to know your test results and keep a list of the medicines you take.  How can you care for yourself at home?  Try a simple exercise to help open blocked tubes. Close your mouth, hold your nose, and gently blow as if you are blowing your nose. Yawning and chewing gum also may help. You may hear or feel a \"pop\" when the tubes open.  To ease ear pain, apply a warm washcloth or a heating pad set on low. There may be some drainage from the ear when the heat melts earwax. Put a cloth between the heat source and your skin.  If your doctor prescribed antibiotics, take them as directed. Do not stop taking them just because you feel better. You need to take the full course of antibiotics.  Be safe with medicines. Depending on the cause of the problem, your doctor may " "recommend over-the-counter medicine. For example, adults may try decongestants for cold symptoms or nasal spray steroids for allergies. Follow the instructions carefully.  Be careful with cough and cold medicines. Don't give them to children younger than 6, because they don't work for children that age and can even be harmful. For children 6 and older, always follow all the instructions carefully. Make sure you know how much medicine to give and how long to use it. And use the dosing device if one is included.  When should you call for help?   Call your doctor now or seek immediate medical care if:    You develop sudden, complete hearing loss.     You have severe pain or feel dizzy.     You have new or increasing pus or blood draining from your ear.     You have redness, swelling, or pain around or behind the ear.   Watch closely for changes in your health, and be sure to contact your doctor if:    You do not get better after 2 weeks.     You have any new symptoms, such as itching or a feeling of fullness in the ear.        What is the Eustachian tube?  This is tube is a tube that connects the middle ear (the part of the ear behind the eardrum) to the back of the nose and throat (figure 1).  Normally, the Eustachian tube opens and closes. This helps keep the air pressure inside the middle ear the same as the air pressure outside the middle ear. If there is a problem with the tube opening, the air pressure inside the middle ear won't be the same as the air pressure outside it. This can cause ear pain, hearing loss, and other symptoms. \"Ear barotrauma\" is the medical term for when people have symptoms or damage in the middle ear because of air pressure differences.  In some people, the Eustachian tube does not close normally, but stays open all of the time. This can also cause symptoms like hearing the sound of your own voice and breathing.  Most Eustachian tube problems last only a short time and get better on their " "own. But they can sometimes lead to more serious conditions, such as:  ?A middle ear infection  ?A torn eardrum  ?Hearing loss  In children, long-term hearing loss from Eustachian tube problems can also lead to language or speech problems.    What causes Eustachian tube problems?  Common causes are:  ?Illnesses or conditions that make the Eustachian tubes swollen or inflamed - These include colds, allergies, ear infections, or sinus infections. The sinuses are hollow areas in the bones of the face.  ?Sudden air pressure changes - These can happen when people fly in an airplane, scuba dive, or drive up to the mountains.  ?Growths that block the Eustachian tube  ?Being born with an abnormal Eustachian tube    What are the symptoms of a Eustachian tube problem?  Common symptoms include:  ?Ear pain  ?Feeling pressure or fullness in the ear  ?Trouble hearing  ?Ringing in the ear  ?Feeling dizzy  ?Loud sounds of your own voice or your own breathing    Should I see a doctor or nurse?  See your doctor or nurse if your symptoms are severe, get worse, or don't go away after a few days.  Will I need tests?  Probably not. Your doctor or nurse should be able to tell if you have a Eustachian tube problem by learning about your symptoms and doing an exam.  If your symptoms are severe, last for a long time, or only affect 1 ear, your doctor or nurse might:  ?Have you see a special kind of doctor called an ear, nose, and throat (\"ENT\") doctor  ?Do tests to check your hearing  ?Do an imaging test - These create pictures of the inside of the body.    How are Eustachian tube problems treated?  Treatment depends on what's causing the problem. Depending on your individual situation, your doctor might treat you with 1 or more of the following:  ?Nose sprays  ?Antihistamines - These medicines are usually used to treat allergies. They help stop itching, sneezing, and runny nose symptoms.  ?Decongestants - These medicines can help with " stuffy nose symptoms.  ?Instructions to avoid dehydration - Drink a lot of fluids, especially before doing physical activity or being in the heat.  ?Surgery - Most people do not need surgery for Eustachian tube problems. But people might need surgery if their symptoms don't get better with medicines or they have severe or long-term symptoms.  Antibiotics are not needed to treat Eustachian tube problems. But they might be needed if a person has an ear infection.    Patient understood and verbally consented to the treatment plan. Discussed symptoms that would warrant an urgent or emergent visit. All of the patients' questions were answered. Patient was instructed to contact the clinic if questions or concerns arise. Recommend follow up appointments if symptoms worsen or fail to improve. Recommend follow up as needed. Recommend ER in the case of an emergency.    Kailee Yin PA-C    Please note: Voice recognition software may have been used in preparing this note, unintended word substitutions may be present.                Lucille Lynn is a 39 year old, presenting for the following health issues:  Follow Up (Pneumonia )        10/29/2024    10:27 AM   Additional Questions   Roomed by Shantel Tripathi of Present Illness       Reason for visit:  Follow up    She eats 2-3 servings of fruits and vegetables daily.She consumes 0 sweetened beverage(s) daily.She exercises with enough effort to increase her heart rate 10 to 19 minutes per day.  She exercises with enough effort to increase her heart rate 3 or less days per week. She is missing 2 dose(s) of medications per week.  She is not taking prescribed medications regularly due to remembering to take.     Subjective  The patient reports feeling somewhat better but not completely recovered. Last week, they worked full-time but felt worse as the week progressed. After resting over the weekend, they still feel very congested and experience a dry cough at night,  "which was particularly rough last night. 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 The patient has been using Mucinex, which seemed effective last week but less so over the weekend, as they continue to expel a lot of mucus when blowing their nose. They have no side effects from their current medication and ensure to eat with it, finding it better on their stomach than previous medications. They do not currently take any other medication and declined Tessalon for the cough, believing the issue is related to drainage. They express concern about their ears becoming severely plugged, as happened a couple of years ago, leading to weeks of hearing loss.    Objective  - Physical exam:  - Fluid in ears observed    Assessment  - Eustachian tube dysfunction causing fluid accumulation in the ears, leading to congestion and hearing difficulties.  - No infection present, but need to address the tube dysfunction to alleviate symptoms.    Plan  - Use Afrin nasal spray for three days, then switch to Flonase.  - Take Zyrtec 10 mg daily.  - Use saline sinus rinses to help open eustachian tubes.  - Tylenol and Advil are recommended for pain management.  - Prescription for Afrin and Zyrtec sent to the pharmacy.  - Monitor for improvement in congestion and ear symptoms.                    Objective    /60   Pulse 77   Temp 98.8  F (37.1  C) (Temporal)   Resp 16   Ht 1.73 m (5' 8.11\")   Wt 119.5 kg (263 lb 6 oz)   LMP 10/01/2024 (Exact Date)   SpO2 99%   BMI 39.92 kg/m    Body mass index is 39.92 kg/m .  Physical Exam     Ears: Fluid effusion bilaterally  Cardiac exam: S1-S2 no murmurs rubs or gallops  Lung exam: Clear to auscultation bilaterally no wheezes or rhonchi  Virtual Visit on 10/14/2024   Component Date " Value Ref Range Status    SARS CoV2 PCR 10/14/2024 Negative  Negative Final    NEGATIVE: SARS-CoV-2 (COVID-19) RNA not detected, presumed negative.    Influenza A antigen 10/14/2024 Negative  Negative Final    Influenza B antigen 10/14/2024 Negative  Negative Final     No results found for any visits on 10/29/24.  No results found.        Signed Electronically by: Kailee Yin PA-C

## 2024-11-04 ENCOUNTER — HOSPITAL ENCOUNTER (EMERGENCY)
Facility: CLINIC | Age: 39
Discharge: HOME | End: 2024-11-04
Attending: EMERGENCY MEDICINE | Admitting: EMERGENCY MEDICINE
Payer: COMMERCIAL

## 2024-11-04 VITALS
SYSTOLIC BLOOD PRESSURE: 167 MMHG | RESPIRATION RATE: 22 BRPM | TEMPERATURE: 98 F | DIASTOLIC BLOOD PRESSURE: 96 MMHG | WEIGHT: 271 LBS | HEIGHT: 68 IN | BODY MASS INDEX: 41.07 KG/M2 | HEART RATE: 104 BPM | OXYGEN SATURATION: 97 %

## 2024-11-04 DIAGNOSIS — F32.A DEPRESSION, UNSPECIFIED DEPRESSION TYPE: ICD-10-CM

## 2024-11-04 PROCEDURE — 99283 EMERGENCY DEPT VISIT LOW MDM: CPT | Performed by: EMERGENCY MEDICINE

## 2024-11-04 ASSESSMENT — ACTIVITIES OF DAILY LIVING (ADL)
ADLS_ACUITY_SCORE: 0
ADLS_ACUITY_SCORE: 0

## 2024-11-04 ASSESSMENT — COLUMBIA-SUICIDE SEVERITY RATING SCALE - C-SSRS
1. IN THE PAST MONTH, HAVE YOU WISHED YOU WERE DEAD OR WISHED YOU COULD GO TO SLEEP AND NOT WAKE UP?: NO
6. HAVE YOU EVER DONE ANYTHING, STARTED TO DO ANYTHING, OR PREPARED TO DO ANYTHING TO END YOUR LIFE?: NO
2. HAVE YOU ACTUALLY HAD ANY THOUGHTS OF KILLING YOURSELF IN THE PAST MONTH?: NO
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
## 2024-11-04 NOTE — LETTER
November 4, 2024      To Whom It May Concern:      Leah Casarez was seen in our Emergency Department today, 11/04/24.  I expect her condition to improve over the next 5 days. She should take the next 3 days of of work.  She may return to work when improved after that when improved.     Sincerely,        Gerson Almazan MD

## 2024-11-04 NOTE — DISCHARGE INSTRUCTIONS
Return to the emergency department if new or worsening symptoms.  If you make no progress with getting through for appointments you can return here and we will try again with DEC.  Was a pleasure to meet you.  Good luck moving forward.
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
## 2024-11-05 ENCOUNTER — VIRTUAL VISIT (OUTPATIENT)
Dept: FAMILY MEDICINE | Facility: OTHER | Age: 39
End: 2024-11-05
Payer: COMMERCIAL

## 2024-11-05 ENCOUNTER — PATIENT OUTREACH (OUTPATIENT)
Dept: CARE COORDINATION | Facility: CLINIC | Age: 39
End: 2024-11-05

## 2024-11-05 DIAGNOSIS — F32.1 MODERATE MAJOR DEPRESSION (H): ICD-10-CM

## 2024-11-05 DIAGNOSIS — F41.1 GAD (GENERALIZED ANXIETY DISORDER): ICD-10-CM

## 2024-11-05 PROCEDURE — 99214 OFFICE O/P EST MOD 30 MIN: CPT | Mod: 95 | Performed by: STUDENT IN AN ORGANIZED HEALTH CARE EDUCATION/TRAINING PROGRAM

## 2024-11-05 PROCEDURE — 96127 BRIEF EMOTIONAL/BEHAV ASSMT: CPT | Mod: 95 | Performed by: STUDENT IN AN ORGANIZED HEALTH CARE EDUCATION/TRAINING PROGRAM

## 2024-11-05 RX ORDER — SERTRALINE HYDROCHLORIDE 25 MG/1
TABLET, FILM COATED ORAL
Qty: 70 TABLET | Refills: 0 | Status: SHIPPED | OUTPATIENT
Start: 2024-11-05 | End: 2024-11-05

## 2024-11-05 RX ORDER — SERTRALINE HYDROCHLORIDE 25 MG/1
TABLET, FILM COATED ORAL
Qty: 70 TABLET | Refills: 0 | Status: SHIPPED | OUTPATIENT
Start: 2024-11-05 | End: 2024-12-15

## 2024-11-05 ASSESSMENT — ANXIETY QUESTIONNAIRES
3. WORRYING TOO MUCH ABOUT DIFFERENT THINGS: NEARLY EVERY DAY
6. BECOMING EASILY ANNOYED OR IRRITABLE: NEARLY EVERY DAY
1. FEELING NERVOUS, ANXIOUS, OR ON EDGE: NEARLY EVERY DAY
GAD7 TOTAL SCORE: 17
IF YOU CHECKED OFF ANY PROBLEMS ON THIS QUESTIONNAIRE, HOW DIFFICULT HAVE THESE PROBLEMS MADE IT FOR YOU TO DO YOUR WORK, TAKE CARE OF THINGS AT HOME, OR GET ALONG WITH OTHER PEOPLE: EXTREMELY DIFFICULT
5. BEING SO RESTLESS THAT IT IS HARD TO SIT STILL: MORE THAN HALF THE DAYS
GAD7 TOTAL SCORE: 17
7. FEELING AFRAID AS IF SOMETHING AWFUL MIGHT HAPPEN: MORE THAN HALF THE DAYS
2. NOT BEING ABLE TO STOP OR CONTROL WORRYING: MORE THAN HALF THE DAYS

## 2024-11-05 ASSESSMENT — PATIENT HEALTH QUESTIONNAIRE - PHQ9
SUM OF ALL RESPONSES TO PHQ QUESTIONS 1-9: 18
5. POOR APPETITE OR OVEREATING: MORE THAN HALF THE DAYS

## 2024-11-05 NOTE — PROGRESS NOTES
Leah is a 39 year old who is being evaluated via a billable video visit.    How would you like to obtain your AVS? MyChart  If the video visit is dropped, the invitation should be resent by: Text to cell phone: [phone]  Will anyone else be joining your video visit? No      Assessment & Plan     Moderate major depression (H)  - sertraline (ZOLOFT) 25 MG tablet; Take 1 tablet (25 mg) by mouth daily for 10 days, THEN 2 tablets (50 mg) daily.  - EMOTIONAL / BEHAVIORAL ASSESSMENT  HOLLY (generalized anxiety disorder)  - sertraline (ZOLOFT) 25 MG tablet; Take 1 tablet (25 mg) by mouth daily for 10 days, THEN 2 tablets (50 mg) daily.  - EMOTIONAL / BEHAVIORAL ASSESSMENT    Follow-up from emergency department yesterday, patient was seen for emotional distress, stress, and worsening depression.  She was previously on sertraline earlier this year but did self discontinued which she has done in the past for the summer months.  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  She does wish to go back on her medications sertraline, ramping up dosages provided today.  She does have a behavioral health assessment tomorrow, encouraged to continue follow-up with them.  No thoughts of self-harm or harm to others, she has had significant stressors in regards to guilt as well as guilt especially with the family but no current suicide concerns or suicidal plans.  [facility]      Depression Screening Follow Up        11/5/2024    11:20 AM   PHQ   PHQ-9 Total Score 18   Q9: Thoughts of better off dead/self-harm past 2 weeks Several days         11/5/2024    11:20 AM   Last PHQ-9   1.  Little interest or pleasure in doing things 2   2.  Feeling down, depressed, or hopeless 2   3.  Trouble falling or staying asleep, or sleeping too much 3   4.  Feeling tired or having little energy 3   5.  Poor appetite or overeating 2   6.  Feeling bad about  yourself 2   7.  Trouble concentrating 2   8.  Moving slowly or restless 1   Q9: Thoughts of better off dead/self-harm past 2 weeks 1   PHQ-9 Total Score 18   Difficulty at work, home, or with people Extremely dIfficult                   Follow Up Actions Taken  Crisis resource information provided in the After Visit Summary  Scheduled appointment with [facility]    Discussed the following ways the patient can remain in a safe environment:  be around others        Lucille Lynn is a 39 year old, presenting for the following health issues:  ER F/U      11/5/2024    11:15 AM   Additional Questions   Roomed by Renata VALLE         11/5/2024    11:15 AM   Patient Reported Additional Medications   Patient reports taking the following new medications multivitamin     HPI     ED/UC Followup:    Facility:  [facility] ED  Date of visit: 11/04/24  Reason for visit: Depression/Anxiety  Current Status: mentally/emotionally exhausted, tension headache, feeling very down    PHQ-9&HOLLY-7 completed during rooming.        Review of Systems  Constitutional, HEENT, cardiovascular, pulmonary, gi and gu systems are negative, except as otherwise noted.      Objective           Vitals:  No vitals were obtained today due to virtual visit.    Physical Exam   GENERAL: alert and no distress  EYES: Eyes grossly normal to inspection.  No discharge or erythema, or obvious scleral/conjunctival abnormalities.  RESP: No audible wheeze, cough, or visible cyanosis.    SKIN: Visible skin clear. No significant rash, abnormal pigmentation or lesions.  NEURO: Cranial nerves grossly intact.  Mentation and speech appropriate for age.  PSYCH: Appropriate affect, tone, and pace of words          Video-Visit Details    Type of service:  Video Visit   Originating Location (pt. Location): Home    Distant Location (provider location):  Off-site  Platform used for Video Visit: Will  Signed Electronically by: KENDAL ROBERTSON MD

## 2024-11-05 NOTE — PATIENT INSTRUCTIONS
Keys to success to help control anxiety, depression, and/or stress    -Physical activity in any way every day even simply going for a walk    -Getting quality sleep every day and maintain a sleep schedule by going to sleep and waking up at the same time every day    -Eating 3 good/healthy meals every day    -Consider mindful activities such as meditation (consider the apps such as Calm or Headspace), yoga, piliates, etc...     -Consider relaxation techniques such as massage, yoga, spa time, use of essential oils    -Removed possible triggers of anxiety or depression (caffeine - nothing past lunch time, stimulants, nicotine, dietary triggers, stress)    -Light therapy. Simply being outside in sunlight or consider buying a Happy Light    -If you are on medication make sure you are taking it appropriately and as prescribed    -Talking things over with a friend or loved one, even consider formal therapy    -Over the counter medicines such as Vitamin D ([id number] international unit(s)), activated folic acid (L-methyl-folate), B vitamins, Omega 3 fatty acids (fish oil)    -Breathing exercises (consider the stephen BreathWork)        Therapy options in the area      [facility] [place]  Counseling & mental health  [address]   [phone]      [facility]  Counseling & mental health  [address] [facility]   [phone]      [facility] [place]  Counseling & mental health  [address] [facility]   [phone]      [facility]  Counseling & mental health  [address] [facility]   [phone]      [facility]  [address], [place] [address] [phone], [email or web address]      [facility]  Health & medical  [address]   [phone]      [facility] Mental [facility]  [address] [place] [id number] [phone]  [email or web address]      [facility] [place]  [phone]  [email or web address]      [facility]  Counseling & mental health  [address] [facility] [address], [place]   [phone]      [email or web address]  Gives a wide network of psychologist/therapy options (can search based off of city or state)        OR call 911 OR report to the closest emergency department in the event of a emergency      National Suicide Prevention Lifeline - 988  The 988 Suicide and Crisis Lifeline     How to access the Lifeline:    Call 1-174.426.7661  Call 988 - services available in English and Andorran, interpretation services in over 150 languages  Text 988 (English only)  Chat using the Lifeline website (English only)     Key Details:    The 988 dialing code will operate through the existing Lifeline number (1-435.892.9213); the 10-digit number will not go away.  988 is confidential, free, and available 24/7/365  988 is accessible through every land line, cell phone, and voice-over internet device in the [place]      Reviewed: 2023

## 2024-11-06 ENCOUNTER — VIRTUAL VISIT (OUTPATIENT)
Dept: BEHAVIORAL HEALTH | Facility: CLINIC | Age: 39
End: 2024-11-06
Payer: COMMERCIAL

## 2024-11-06 DIAGNOSIS — F41.1 GENERALIZED ANXIETY DISORDER: ICD-10-CM

## 2024-11-06 DIAGNOSIS — F34.1 PERSISTENT DEPRESSIVE DISORDER: Primary | ICD-10-CM

## 2024-11-06 PROCEDURE — 90832 PSYTX W PT 30 MINUTES: CPT | Mod: 95 | Performed by: COUNSELOR

## 2024-11-06 ASSESSMENT — ANXIETY QUESTIONNAIRES
GAD7 TOTAL SCORE: 15
GAD7 TOTAL SCORE: 15
7. FEELING AFRAID AS IF SOMETHING AWFUL MIGHT HAPPEN: SEVERAL DAYS
5. BEING SO RESTLESS THAT IT IS HARD TO SIT STILL: NOT AT ALL
6. BECOMING EASILY ANNOYED OR IRRITABLE: NEARLY EVERY DAY
7. FEELING AFRAID AS IF SOMETHING AWFUL MIGHT HAPPEN: SEVERAL DAYS
1. FEELING NERVOUS, ANXIOUS, OR ON EDGE: NEARLY EVERY DAY
3. WORRYING TOO MUCH ABOUT DIFFERENT THINGS: NEARLY EVERY DAY
8. IF YOU CHECKED OFF ANY PROBLEMS, HOW DIFFICULT HAVE THESE MADE IT FOR YOU TO DO YOUR WORK, TAKE CARE OF THINGS AT HOME, OR GET ALONG WITH OTHER PEOPLE?: EXTREMELY DIFFICULT
IF YOU CHECKED OFF ANY PROBLEMS ON THIS QUESTIONNAIRE, HOW DIFFICULT HAVE THESE PROBLEMS MADE IT FOR YOU TO DO YOUR WORK, TAKE CARE OF THINGS AT HOME, OR GET ALONG WITH OTHER PEOPLE: EXTREMELY DIFFICULT
2. NOT BEING ABLE TO STOP OR CONTROL WORRYING: NEARLY EVERY DAY
GAD7 TOTAL SCORE: 15
4. TROUBLE RELAXING: MORE THAN HALF THE DAYS

## 2024-11-06 ASSESSMENT — COLUMBIA-SUICIDE SEVERITY RATING SCALE - C-SSRS
2. HAVE YOU ACTUALLY HAD ANY THOUGHTS OF KILLING YOURSELF?: NO
6. HAVE YOU EVER DONE ANYTHING, STARTED TO DO ANYTHING, OR PREPARED TO DO ANYTHING TO END YOUR LIFE?: NO
SUICIDE, SINCE LAST CONTACT: NO
1. SINCE LAST CONTACT, HAVE YOU WISHED YOU WERE DEAD OR WISHED YOU COULD GO TO SLEEP AND NOT WAKE UP?: NO
1. IN THE PAST MONTH, HAVE YOU WISHED YOU WERE DEAD OR WISHED YOU COULD GO TO SLEEP AND NOT WAKE UP?: YES
LETHALITY/MEDICAL DAMAGE CODE MOST LETHAL ACTUAL ATTEMPT: NO PHYSICAL DAMAGE OR VERY MINOR PHYSICAL DAMAGE
2. HAVE YOU ACTUALLY HAD ANY THOUGHTS OF KILLING YOURSELF?: NO
TOTAL  NUMBER OF ABORTED OR SELF INTERRUPTED ATTEMPTS SINCE LAST CONTACT: NO
TOTAL  NUMBER OF INTERRUPTED ATTEMPTS SINCE LAST CONTACT: NO
6. IN YOUR LIFETIME, HAVE YOU EVER DONE ANYTHING, STARTED TO DO ANYTHING, OR PREPARED TO DO ANYTHING TO END YOUR LIFE?: NO
ATTEMPT SINCE LAST CONTACT: NO
LETHALITY/MEDICAL DAMAGE CODE MOST LETHAL POTENTIAL ATTEMPT: BEHAVIOR NOT LIKELY TO RESULT IN INJURY

## 2024-11-06 ASSESSMENT — PATIENT HEALTH QUESTIONNAIRE - PHQ9
SUM OF ALL RESPONSES TO PHQ QUESTIONS 1-9: 14
10. IF YOU CHECKED OFF ANY PROBLEMS, HOW DIFFICULT HAVE THESE PROBLEMS MADE IT FOR YOU TO DO YOUR WORK, TAKE CARE OF THINGS AT HOME, OR GET ALONG WITH OTHER PEOPLE: EXTREMELY DIFFICULT
SUM OF ALL RESPONSES TO PHQ QUESTIONS 1-9: 14

## 2024-11-07 NOTE — PROGRESS NOTES
[facility] Primary Care: Integrated Behavioral Health    Integrated Behavioral Health   Mental Health & Addiction Services      Progress Note - Initial [facility] Visit     Patient Name: Leah Casarez    Date: 2024  Service Type: Consult Note   Visit Start Time: 8:35am  Visit End Time:  9:15am   Attendees: Patient   Service Modality: Video Visit:      Provider verified identity through the following two step process.  Patient provided:  Patient     Telemedicine Visit: The patient's condition can be safely assessed and treated via synchronous audio and visual telemedicine encounter.      Reason for Telemedicine Visit: Patient has requested telehealth visit    Originating Site (Patient Location): Patient's home    Distant Site (Provider Location): Provider Remote Setting- Home Office    Consent:  The patient/guardian has verbally consented to: the potential risks and benefits of telemedicine (video visit) versus in person care; bill my insurance or make self-payment for services provided; and responsibility for payment of non-covered services.     Patient would like the video invitation sent by:  My Chart    Mode of Communication:  Video Conference via Amwell    Distant Location (Provider):  Off-site    As the provider I attest to compliance with applicable laws and regulations related to telemedicine.     [facility] Visit Activities (Refresh list every visit): NEW         DATA:     Interactive Complexity: No   Crisis: No     Assessments completed prior to this visit:     The following assessments were completed by patient for this visit:  PHQ2:       3/1/2022     4:02 PM 3/8/2021     2:35 PM 8/10/2020     9:54 AM 2019    11:29 AM 2019    11:28 AM 2018    12:46 PM 2018    10:50 AM   PHQ-2 (  [employer])   Q1: Little interest or pleasure in doing things 2  0  0  1  0  0  0   Q2: Feeling down, depressed or hopeless 2  0  0  1  0  0  0   PHQ-2 Score 4 0 0 2 0 0 0    PHQ-2 Total Score (12-17 Years)- Positive if 3 or more points; Administer PHQ-A if positive  0 0 2 0     Q1: Little interest or pleasure in doing things More than half the days Not at all Not at all Several days Not at all Not at all    Q2: Feeling down, depressed or hopeless More than half the days Not at all Not at all Several days Not at all Not at all    PHQ-2 Score 4 0 0 2 0 0        Patient-reported     PHQ9:       3/1/2022     4:00 PM 7/26/2022     2:56 PM 9/20/2023     9:47 AM 11/21/2023    10:15 AM 10/14/2024     8:08 AM 11/5/2024    11:20 AM 11/6/2024     7:08 AM   PHQ-9 SCORE   PHQ-9 Total Score MyChart 16 (Moderately severe depression) 6 (Mild depression) 8 (Mild depression)  2 (Minimal depression)  14 (Moderate depression)   PHQ-9 Total Score 16 6 8 7 2 18 14        Patient-reported     GAD2:       11/6/2024     7:30 AM   HOLLY-2   Feeling nervous, anxious, or on edge 3    Not being able to stop or control worrying 3    HOLLY-2 Total Score 6        Patient-reported     GAD7:       10/28/2019     9:42 AM 10/28/2019     9:43 AM 11/5/2019    11:28 AM 3/8/2021     2:34 PM 3/1/2022     4:00 PM 11/5/2024    11:20 AM 11/6/2024     7:30 AM   HOLLY-7 SCORE   Total Score   6 (mild anxiety) 0 (minimal anxiety) 17 (severe anxiety)  15 (severe anxiety)   Total Score 8 8 6 0 17 17 15        Patient-reported     CAGE-AID:       11/6/2024     7:32 AM   CAGE-AID Total Score   Total Score 0    Total Score MyChart 0 (A total score of 2 or greater is considered clinically significant)       Patient-reported     PROMIS 10-Global Health (only subscores and total score):       11/6/2024     7:32 AM   PROMIS-10 Scores Only   Global Mental Health Score 9    Global Physical Health Score 11    PROMIS TOTAL - SUBSCORES 20        Patient-reported     PROMIS Parent Proxy Scale V1.0 Global Health 7+2: No questionnaires on file.  Becker Suicide Severity Rating Scale (Short Version)      4/2/2019     1:16 PM 8/12/2019     8:20 AM  11/4/2024     2:29 PM 11/6/2024     8:38 AM   [place] Suicide Severity Rating (Short Version)   Over the past 2 weeks have you felt down, depressed, or hopeless?  no     Over the past 2 weeks have you had thoughts of killing yourself?  no     Have you ever attempted to kill yourself?  no     Q1 Wished to be Dead (Past Month) no  0-->no    Q2 Suicidal Thoughts (Past Month) no  0-->no    Q6 Suicide Behavior (Lifetime) no  0-->no    Level of Risk per Screen   no risks indicated    1. Wish to be Dead (Since Last Contact)    N   2. Non-Specific Active Suicidal Thoughts (Since Last Contact)    N   Actual Attempt (Since Last Contact)    N   Has subject engaged in non-suicidal self-injurious behavior? (Since Last Contact)    N   Interrupted Attempts (Since Last Contact)    N   Aborted or Self-Interrupted Attempt (Since Last Contact)    N   Preparatory Acts or Behavior (Since Last Contact)    N   Suicide (Since Last Contact)    N   Actual Lethality/Medical Damage Code (Most Lethal Attempt)    0   Potential Lethality Code (Most Lethal Attempt)    0   Calculated C-SSRS Risk Score (Since Last Contact)    No Risk Indicated        Referral:   Patient was referred to [facility] by self and [unfilled].    Reason for referral: clarify behavioral health diagnosis and determine behavioral health treatment options.      [facility] introduced self and role. Discussed informed consent and limits to confidentiality.     Presenting Concerns/ Current Stressors:   Patient has stressors about work and wanting to quit.   Patient reports that struggles with being a mom and a wife.  Patient reports to have a hard time to cope.      Therapeutic Interventions:  Psycho-education: Provided psycho-education about patient's behavioral health condition and symptoms. Explained and reviewed treatment options.  Mindfulness: Worked with patient to identify automatic thoughts and judgments that occur in response to thoughts and emotions.  Provided education and guidance  for the development of increased capacity for mindfulness and self-awareness.   Provided psycho-education around developing mindfulness strategies.     Response to treatment interventions:   Patient was receptive to interventions utilized.  Patient was engaged in the therapy process.      Safety Issues and Plan for Safety and Risk Management:     Patient has had a history of suicidal ideation: when she was 20 years old.    Patient denies current fears or concerns for personal safety.   Patient denies current or recent suicidal ideation or behaviors.   Patient denies current or recent homicidal ideation or behaviors.   Patient denies current or recent self injurious behavior or ideation.   Patient denies other safety concerns.   Recommended that patient call 911 or go to the local ED should there be a change in any of these risk factors   Patient reports there are no firearms in the house.       ASSESSMENT:   Mental Status:     Appearance:   Appropriate    Eye Contact:   Fair    Psychomotor Behavior: Normal    Attitude:   Cooperative  Interested Friendly Pleasant Guarded    Orientation:   All   Speech Rate / Production: Normal/ Responsive Talkative   Volume:   Normal    Mood:    Anxious  Depressed  Sad    Affect:    Appropriate  Flat    Thought Content:  Clear    Thought Form:  Coherent  Logical    Insight:    Fair        Diagnostic Criteria:   Generalized Anxiety Disorder  A. Excessive anxiety and worry about a number of events or activities (such as work or school performance).   B. The person finds it difficult to control the worry.  C. Select 3 or more symptoms (required for diagnosis). Only one item is required in children.   - Difficulty concentrating or mind going blank.    - Irritability.    - Muscle tension.    - Sleep disturbance (difficulty falling or staying asleep, or restless unsatisfying sleep).   D. The focus of the anxiety and worry is not confined to features of an Axis I disorder.  E. The anxiety,  worry, or physical symptoms cause clinically significant distress or impairment in social, occupational, or other important areas of functioning.   F. The disturbance is not due to the direct physiological effects of a substance (e.g., a drug of abuse, a medication) or a general medical condition (e.g., hyperthyroidism) and does not occur exclusively during a Mood Disorder, a Psychotic Disorder, or a Pervasive Developmental Disorder.    - The aformentioned symptoms began 6 year(s) ago and occurs 4 days per week and is experienced as moderate.  Persistent Depressive Disorder  A. Depressed mood for most of the day, for more days than not, as indicated either by subjective account or observation by others, for at least 2 years. Note: In children and adolescents, mood can be irritable and duration must be at least 1 year.   B. Presence, while depressed, of two (or more) of the following:        - poor appetite or overeating        - low energy or fatigue        - poor concentration or difficulty making decisions        - feelings of hopelessness   C. During the 2-year period (1 year for children or adolescents) of the disturbance, the person has never been without the symptoms in Criteria A and B for more than 2 months at a time.  D. Criteria for a major depressive disorder may be continously present for 2 years  E. There has never been a Manic Episode, a Mixed Episode, or a Hypomanic Episode, and criteria have never been met for Cyclothymic Disorder.   F. The disturbance is not better explained by a persistent schizoaffective disorder, schizophrenia, delusional disorder, or other specified or unspecified schizophrenia spectrum and other psychotic disorder  G. The symptoms are not attributable to the physiological effects of a substance (e.g., a drug of abuse, a medication) or another medical condition (e.g., hypothyroidism).   H. The symptoms cause clinically significant distress or impairment in social, occupational,  or other important areas of functioning.        - Late Onset: if onset is age 21 years or older         DSM5 Diagnoses: (Sustained by DSM5 Criteria Listed Above)     Diagnoses: 300.4 (F34.1) Persistent Depressive Disorder, Late onset and Moderate  300.02 (F41.1) Generalized Anxiety Disorder     Psychosocial / Contextual Factors: Occupational Issues, Interpersonal Concerns, and Caregiver       Collateral Reports Completed:   Not Applicable        PLAN: (Homework, other):     1. Patient was provided:  recommendation to schedule follow-up with [facility]     2. Provider recommended the following referrals: Return to [facility] for a brief DA.        3. Suicide Risk and Safety Concerns were assessed for Leah Casarez    Safety Plan:   Patient denied any current/recent/lifetime history of suicidal ideation and/or behaviors. Recommended that patient call 911 or go to the local ED should there be a change in any of these risk factors        Sangeeta Lopez [facility], [facility]   November 6, 2024    Answers submitted by the patient for this visit:  Patient Health Questionnaire (Submitted on 11/6/2024)  If you checked off any problems, how difficult have these problems made it for you to do your work, take care of things at home, or get along with other people?: Extremely difficult  PHQ9 TOTAL SCORE: 14  Patient Health Questionnaire (G7) (Submitted on 11/6/2024)  HOLLY 7 TOTAL SCORE: 15

## 2024-11-18 ENCOUNTER — TELEPHONE (OUTPATIENT)
Dept: ALLERGY | Facility: CLINIC | Age: 39
End: 2024-11-18
Payer: COMMERCIAL

## 2024-11-19 ENCOUNTER — VIRTUAL VISIT (OUTPATIENT)
Dept: BEHAVIORAL HEALTH | Facility: CLINIC | Age: 39
End: 2024-11-19
Payer: COMMERCIAL

## 2024-11-19 DIAGNOSIS — F41.1 GENERALIZED ANXIETY DISORDER: ICD-10-CM

## 2024-11-19 DIAGNOSIS — F34.1 PERSISTENT DEPRESSIVE DISORDER: Primary | ICD-10-CM

## 2024-11-19 ASSESSMENT — COLUMBIA-SUICIDE SEVERITY RATING SCALE - C-SSRS
2. HAVE YOU ACTUALLY HAD ANY THOUGHTS OF KILLING YOURSELF?: NO
6. IN YOUR LIFETIME, HAVE YOU EVER DONE ANYTHING, STARTED TO DO ANYTHING, OR PREPARED TO DO ANYTHING TO END YOUR LIFE?: NO
1. IN THE PAST MONTH, HAVE YOU WISHED YOU WERE DEAD OR WISHED YOU COULD GO TO SLEEP AND NOT WAKE UP?: NO

## 2024-11-19 ASSESSMENT — PATIENT HEALTH QUESTIONNAIRE - PHQ9
10. IF YOU CHECKED OFF ANY PROBLEMS, HOW DIFFICULT HAVE THESE PROBLEMS MADE IT FOR YOU TO DO YOUR WORK, TAKE CARE OF THINGS AT HOME, OR GET ALONG WITH OTHER PEOPLE: VERY DIFFICULT
SUM OF ALL RESPONSES TO PHQ QUESTIONS 1-9: 11
SUM OF ALL RESPONSES TO PHQ QUESTIONS 1-9: 11

## 2024-11-19 NOTE — PROGRESS NOTES
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 change in any of these risk factors.          Report to child / adult protection services was NA.     2. Patient's identified  no culture issues .     3. Initial Treatment will focus on:    Depressed Mood - getting more energy  Anxiety - manage the feelings of anxiety  Relational Problems related to: Conflict or difficulties with partner/spouse and Parent / child conflict  Functional Impairment at: home and work.     4. Resources/Service Plan:    services are not indicated.   Modifications to assist communication are not indicated.   Additional disability accommodations are not indicated.      5. Collaboration:   Collaboration / coordination of treatment will be initiated with the following  support professionals: primary care physician.      6.  Referrals:   The following referral(s) will be initiated: Outpatient Mental Bret Therapy.       A Release of Information has been obtained for the following:  None .     Clinical Substantiation/medical necessity for the above recommendations:   to help manage symptoms, develop coping strategies, reduce the risk of hospitalization or self-harm, and improve overall functioning. Therapy is essential for emotional stabilization, improving mental well-being, and  supporting the patient s ability to manage day-to-day life effectively.    7. MAYKEL:    MAYKEL:  Discussed the general effects of drugs and alcohol on health and well-being. Provider gave patient printed information about the effects of chemical use on their health and well being. Recommendations:  None at this time .     8. Records:   These were reviewed at time of assessment.   Information in this assessment was obtained from the medical record and  provided by patient who is a fair historian.    Patient will have open access to their mental health medical record.    9.   Interactive Complexity: No    10. Safety Plan:   Franky Safety Plan      Creation Date: 11/19/24       Step 1: Warning signs:    Warning Signs    days she can't get out of bed    can't focus      Step 2: Internal coping strategies - Things I can do to take my mind off my problems without contacting another person:    Strategies    reading    watching tv    laying by florian masdf tree      Step 3: People and social settings that provide distraction:    Name Contact Information    friends     Coworkers     Children          Step 4: People whom I can ask for help during a crisis:    Name Contact Information    Renata Camacho       Step 5: Professionals or agencies I can contact during a crisis:    Clinician/Agency Name Phone Emergency Contact    911        Suicide Prevention Lifeline Phone: Call or Text 988  Crisis Text Line: Text HOME to 319257     Step 6: Making the environment safer (plan for lethal means safety):   Did not identify any lethal methods     Optional: What is most important to me and worth living for?:      Franky Safety Plan. Almita Fan and Radu Murillo. Used with permission of the authors.         Provider Name/ Credentials:      ANNETTE Lopez MS, LPCC, NCC, CCTP  Behavioral Health Clinician  November 19, 2024        Answers submitted by the patient for this visit:  Patient Health Questionnaire (Submitted on  11/19/2024)  If you checked off any problems, how difficult have these problems made it for you to do your work, take care of things at home, or get along with other people?: Very difficult  PHQ9 TOTAL SCORE: 11

## 2024-11-20 ASSESSMENT — COLUMBIA-SUICIDE SEVERITY RATING SCALE - C-SSRS
6. HAVE YOU EVER DONE ANYTHING, STARTED TO DO ANYTHING, OR PREPARED TO DO ANYTHING TO END YOUR LIFE?: NO
1. SINCE LAST CONTACT, HAVE YOU WISHED YOU WERE DEAD OR WISHED YOU COULD GO TO SLEEP AND NOT WAKE UP?: NO
2. HAVE YOU ACTUALLY HAD ANY THOUGHTS OF KILLING YOURSELF?: NO
ATTEMPT SINCE LAST CONTACT: NO
TOTAL  NUMBER OF ABORTED OR SELF INTERRUPTED ATTEMPTS SINCE LAST CONTACT: NO
SUICIDE, SINCE LAST CONTACT: NO
LETHALITY/MEDICAL DAMAGE CODE MOST LETHAL ACTUAL ATTEMPT: NO PHYSICAL DAMAGE OR VERY MINOR PHYSICAL DAMAGE
TOTAL  NUMBER OF INTERRUPTED ATTEMPTS SINCE LAST CONTACT: NO
LETHALITY/MEDICAL DAMAGE CODE MOST LETHAL POTENTIAL ATTEMPT: BEHAVIOR NOT LIKELY TO RESULT IN INJURY

## 2024-12-02 ASSESSMENT — ANXIETY QUESTIONNAIRES
GAD7 TOTAL SCORE: 12
3. WORRYING TOO MUCH ABOUT DIFFERENT THINGS: MORE THAN HALF THE DAYS
GAD7 TOTAL SCORE: 12
4. TROUBLE RELAXING: MORE THAN HALF THE DAYS
GAD7 TOTAL SCORE: 12
7. FEELING AFRAID AS IF SOMETHING AWFUL MIGHT HAPPEN: SEVERAL DAYS
1. FEELING NERVOUS, ANXIOUS, OR ON EDGE: MORE THAN HALF THE DAYS
6. BECOMING EASILY ANNOYED OR IRRITABLE: NEARLY EVERY DAY
5. BEING SO RESTLESS THAT IT IS HARD TO SIT STILL: NOT AT ALL
2. NOT BEING ABLE TO STOP OR CONTROL WORRYING: MORE THAN HALF THE DAYS
8. IF YOU CHECKED OFF ANY PROBLEMS, HOW DIFFICULT HAVE THESE MADE IT FOR YOU TO DO YOUR WORK, TAKE CARE OF THINGS AT HOME, OR GET ALONG WITH OTHER PEOPLE?: VERY DIFFICULT
IF YOU CHECKED OFF ANY PROBLEMS ON THIS QUESTIONNAIRE, HOW DIFFICULT HAVE THESE PROBLEMS MADE IT FOR YOU TO DO YOUR WORK, TAKE CARE OF THINGS AT HOME, OR GET ALONG WITH OTHER PEOPLE: VERY DIFFICULT
7. FEELING AFRAID AS IF SOMETHING AWFUL MIGHT HAPPEN: SEVERAL DAYS

## 2024-12-03 ENCOUNTER — OFFICE VISIT (OUTPATIENT)
Dept: FAMILY MEDICINE | Facility: OTHER | Age: 39
End: 2024-12-03
Payer: COMMERCIAL

## 2024-12-03 VITALS
DIASTOLIC BLOOD PRESSURE: 70 MMHG | RESPIRATION RATE: 18 BRPM | TEMPERATURE: 96.9 F | OXYGEN SATURATION: 97 % | HEART RATE: 63 BPM | HEIGHT: 68 IN | WEIGHT: 265 LBS | SYSTOLIC BLOOD PRESSURE: 110 MMHG | BODY MASS INDEX: 40.16 KG/M2

## 2024-12-03 DIAGNOSIS — F32.1 MODERATE MAJOR DEPRESSION (H): ICD-10-CM

## 2024-12-03 DIAGNOSIS — F41.1 GAD (GENERALIZED ANXIETY DISORDER): ICD-10-CM

## 2024-12-03 PROCEDURE — 96127 BRIEF EMOTIONAL/BEHAV ASSMT: CPT | Performed by: STUDENT IN AN ORGANIZED HEALTH CARE EDUCATION/TRAINING PROGRAM

## 2024-12-03 PROCEDURE — 99214 OFFICE O/P EST MOD 30 MIN: CPT | Performed by: STUDENT IN AN ORGANIZED HEALTH CARE EDUCATION/TRAINING PROGRAM

## 2024-12-03 PROCEDURE — G2211 COMPLEX E/M VISIT ADD ON: HCPCS | Performed by: STUDENT IN AN ORGANIZED HEALTH CARE EDUCATION/TRAINING PROGRAM

## 2024-12-03 ASSESSMENT — PAIN SCALES - GENERAL: PAINLEVEL_OUTOF10: NO PAIN (0)

## 2024-12-03 NOTE — PROGRESS NOTES
Assessment & Plan     Moderate major depression (H)  - sertraline (ZOLOFT) 50 MG tablet; Take 1 tablet (50 mg) by mouth daily.  HOLLY (generalized anxiety disorder)  - sertraline (ZOLOFT) 50 MG tablet; Take 1 tablet (50 mg) by mouth daily.  Follow-up mood recheck today, she did start her intake with therapy, she has upcoming weekly appointments starting soon.  She has been taking 25 mg of Zoloft daily with minimal improvement, I do want her to increase her dosage of 50 mg, PHQ and HOLLY both reviewed and both improved.  We will continue to do short-term check-in's.  No side effects to medication as of now.  Plan to follow-up in a month    The longitudinal plan of care for the diagnosis(es)/condition(s) as documented were addressed during this visit. Due to the added complexity in care, I will continue to support Leah in the subsequent management and with ongoing continuity of care.      Lucille Lynn is a 39 year old, presenting for the following health issues:  Mood Recheck     History of Present Illness       Reason for visit:  Follow up for medication She is missing 5 dose(s) of medications per week.  She is not taking prescribed medications regularly due to remembering to take.         Depression   How are you doing with your depression since your last visit? No change  Are you having other symptoms that might be associated with depression? No  Have you had a significant life event?  No   Are you feeling anxious or having panic attacks?   Yes:  Yes anxious   Do you have any concerns with your use of alcohol or other drugs? No    Social History     Tobacco Use    Smoking status: Never     Passive exposure: Never    Smokeless tobacco: Never   Vaping Use    Vaping status: Never Used   Substance Use Topics    Alcohol use: Yes     Comment: 1 per week    Drug use: No         11/5/2024    11:20 AM 11/6/2024     7:08 AM 11/19/2024     8:32 AM   PHQ   PHQ-9 Total Score 18 14  11    Q9: Thoughts of better off  "dead/self-harm past 2 weeks Several days Not at all  Not at all        Patient-reported         11/5/2024    11:20 AM 11/6/2024     7:30 AM 12/2/2024     6:40 PM   HOLLY-7 SCORE   Total Score  15 (severe anxiety) 12 (moderate anxiety)   Total Score 17 15  12        Patient-reported               Review of Systems  Constitutional, HEENT, cardiovascular, pulmonary, gi and gu systems are negative, except as otherwise noted.      Objective    /70   Pulse 63   Temp 96.9  F (36.1  C) (Temporal)   Resp 18   Ht 1.727 m (5' 8\")   Wt 120.2 kg (265 lb)   LMP 10/01/2024 (Exact Date)   SpO2 97%   BMI 40.29 kg/m    Body mass index is 40.29 kg/m .  Physical Exam  Vitals and nursing note reviewed.   Constitutional:       General: She is not in acute distress.     Appearance: Normal appearance. She is not ill-appearing, toxic-appearing or diaphoretic.   HENT:      Head: Normocephalic.      Right Ear: External ear normal.      Left Ear: External ear normal.      Nose: No rhinorrhea.   Eyes:      General:         Right eye: No discharge.         Left eye: No discharge.      Extraocular Movements: Extraocular movements intact.      Conjunctiva/sclera: Conjunctivae normal.      Pupils: Pupils are equal, round, and reactive to light.   Pulmonary:      Effort: Pulmonary effort is normal. No respiratory distress.   Musculoskeletal:         General: Normal range of motion.      Cervical back: Normal range of motion.   Neurological:      Mental Status: She is alert and oriented to person, place, and time.   Psychiatric:         Mood and Affect: Mood normal.         Behavior: Behavior normal.            Signed Electronically by: KENDAL ROBERTSON MD    "

## 2024-12-03 NOTE — PATIENT INSTRUCTIONS
Keys to success to help control anxiety, depression, and/or stress    -Physical activity in any way every day even simply going for a walk    -Getting quality sleep every day and maintain a sleep schedule by going to sleep and waking up at the same time every day    -Eating 3 good/healthy meals every day    -Consider mindful activities such as meditation (consider the apps such as Calm or Headspace), yoga, piliates, etc...     -Consider relaxation techniques such as massage, yoga, spa time, use of essential oils    -Removed possible triggers of anxiety or depression (caffeine - nothing past lunch time, stimulants, nicotine, dietary triggers, stress)    -Light therapy. Simply being outside in sunlight or consider buying a Happy Light    -If you are on medication make sure you are taking it appropriately and as prescribed    -Talking things over with a friend or loved one, even consider formal therapy    -Over the counter medicines such as Vitamin D (6107-7716 international unit(s)), activated folic acid (L-methyl-folate), B vitamins, Omega 3 fatty acids (fish oil)    -Breathing exercises (consider the stephen BreathWork)        Therapy options in the area      [facility] [place]  Counseling & mental health  [address]   [phone]      [facility]  Counseling & mental health  [address] [facility]   [phone]      [facility] [place]  Counseling & mental health  [address] [facility]   [phone]      [facility]  Counseling & mental health  [address] [facility]   [phone]      [facility]  [address], [place] [address] [phone], [email or web address]      [facility]  Health & medical  [address]   [phone]      [facility]  [address] [place] [id number] [phone]  [email or web address]      [facility] [place]  [phone]  [email or web address]      Located within [facility]  Counseling & mental health  [address] [facility] [address], [place]   [phone]      [email or web address]  Gives a wide network of psychologist/therapy options (can search based off of city or state)        OR call 911 OR report to the closest emergency department in the event of a emergency      National Suicide Prevention Lifeline - 988  The 988 Suicide and Crisis Lifeline     How to access the Lifeline:    Call 1-283.989.9273  Call 988 - services available in English and Algerian, interpretation services in over 150 languages  Text 988 (English only)  Chat using the Lifeline website (English only)     Key Details:    The 988 dialing code will operate through the existing Lifeline number (1-543.675.7180); the 10-digit number will not go away.  988 is confidential, free, and available 24/7/365  988 is accessible through every land line, cell phone, and voice-over internet device in the [place]      Reviewed: 2023

## 2025-01-11 ENCOUNTER — HEALTH MAINTENANCE LETTER (OUTPATIENT)
Age: 40
End: 2025-01-11

## 2025-02-22 NOTE — PROGRESS NOTES
Leah Casarez is a 32 year old woman who presents to the clinic for an new ob visit.  she has a two year old son Tabby. Previously delivered at [facility].  without complications.       Estimated Date of Delivery: Dec 23, 2017    She has not had bleeding since her LMP.   She has had mild nausea. Weight loss has occurred, for a total of 4 pounds.   This was a planned pregnancy.   OTHER CONCERNS: none  STI HISTORY : no         Patient Active Problem List   Diagnosis     Encounter for surveillance of contraceptive pills     Gluten intolerance     Obesity (BMI 35.0-39.9 without comorbidity) (H)     Past Medical History:   Diagnosis Date     NO ACTIVE PROBLEMS (aka NONE)      Past Surgical History:   Procedure Laterality Date     NO HISTORY OF SURGERY       Current Outpatient Prescriptions   Medication Sig Dispense Refill     Prenatal Vit-Fe Fumarate-FA (PRENATAL VITAMIN PO)          ====================================================  PERSONAL/SOCIAL HISTORY  Social History     Social History     Marital status:      Spouse name: N/A     Number of children: N/A     Years of education: N/A     Occupational History     Special Ed      Social History Main Topics     Smoking status: Never Smoker     Smokeless tobacco: Never Used     Alcohol use No     Drug use: No     Sexual activity: Yes     Partners: Male     Birth control/ protection: Pill     Other Topics Concern     Parent/Sibling W/ Cabg, Mi Or Angioplasty Before 65f 55m? No     Social History Narrative    Lives in [place] with , Demetrio and son, Tabby.  No smokers in the home.  No indoor cats/kittens.  No concerns about domestic violence.     =====================================================   REVIEW OF SYSTEMS  C: NEGATIVE for fever, chills, change in weight  I: NEGATIVE for worrisome rashes, moles or lesions  E: NEGATIVE for vision changes or irritation  ENT: NEGATIVE for ear, mouth and throat problems  R: NEGATIVE for significant  [FreeTextEntry1] : Patient returns to the office for interval assessment  She has a history for right breast cancer  She has completed medical clearance  She denies palpable breast mass cough or SOB  B: NEGATIVE for masses, tenderness or discharge  CV: NEGATIVE for chest pain, palpitations or peripheral edema  GI: NEGATIVE for nausea, abdominal pain, heartburn, or change in bowel habits  : NEGATIVE for unusual urinary or vaginal symptoms. Periods are regular.  M: NEGATIVE for significant arthralgias or myalgia  N: NEGATIVE for weakness, dizziness or paresthesias  E: NEGATIVE for temperature intolerance, skin/hair changes  H: NEGATIVE for bleeding problems  P: NEGATIVE for changes in mood or affect  ====================================================  PHYSICAL EXAM:  /88  Pulse 78  Temp 98.2  F (36.8  C) (Tympanic)  Resp 18  Wt 229 lb (103.9 kg)  LMP 03/18/2017 (Exact Date)  SpO2 99%  BMI 33.82 kg/m2        GENERAL:  Pleasant pregnant female, alert, well groomed.  SKIN:  Warm and dry, without lesions or rashes  HEAD: Symmetrical features.  EYES:  PERRLA,   MOUTH:  Buccal mucosa pink, moist without lesions.    NECK:  Thyroid without enlargement and nodules.  Lymph nodes not palpable.   LUNGS:  Clear to auscultation.  BREAST:  deferred     HEART:  RRR without murmur.  ABDOMEN: Soft without masses , tenderness or organomegaly.  No CVA tenderness. No scars noted.. 's  MUSCULOSKELETAL:  Full range of motion  EXTREMITIES:  No edema. No significant varicosities.   GENITALIA:  BUS WNL, no lesions noted   VAGINA:  Pink, normal rugae and discharge normal and physiologic,   CERVIX:  smooth, without discharge or CMT and parous os,   firm/ closed 3 cm long.  PELVIC: Deferred due to recent ultrasound.    =========================================  ASSESSMENT/PLAN    Supervision of normal first pregnancy, antepartum      RECOMMENDED WEIGHT GAIN: 15-25 lbs.  Instructed on best evidence for: weight gain for her BMI for pregnancy; healthy diet and foods to avoid; exercise and activity during pregnancy;avoiding exposure to toxoplasmosis; and maintenance of a generally healthy lifestyle.    Discussed the harms, benefits, side effects and alternative therapies for current prescribed and OTC medications.    No orders of the defined types were placed in this encounter.        AVS given to patient upon discharge today.  Electronically signed by Payal Messina PA-C  June 21, 2017  2:05 PM

## 2025-03-03 ENCOUNTER — ANCILLARY PROCEDURE (OUTPATIENT)
Dept: GENERAL RADIOLOGY | Facility: OTHER | Age: 40
End: 2025-03-03
Attending: FAMILY MEDICINE
Payer: COMMERCIAL

## 2025-03-03 ENCOUNTER — LAB (OUTPATIENT)
Dept: LAB | Facility: OTHER | Age: 40
End: 2025-03-03
Attending: FAMILY MEDICINE
Payer: COMMERCIAL

## 2025-03-03 ENCOUNTER — VIRTUAL VISIT (OUTPATIENT)
Dept: URGENT CARE | Facility: CLINIC | Age: 40
End: 2025-03-03
Payer: COMMERCIAL

## 2025-03-03 DIAGNOSIS — R50.9 FEBRILE ILLNESS: Primary | ICD-10-CM

## 2025-03-03 DIAGNOSIS — R50.9 FEBRILE ILLNESS: ICD-10-CM

## 2025-03-03 LAB
FLUAV AG SPEC QL IA: POSITIVE
FLUBV AG SPEC QL IA: NEGATIVE

## 2025-03-03 PROCEDURE — 98001 SYNCH AUDIO-VIDEO NEW LOW 30: CPT

## 2025-03-03 PROCEDURE — 71046 X-RAY EXAM CHEST 2 VIEWS: CPT | Mod: TC | Performed by: RADIOLOGY

## 2025-03-03 PROCEDURE — 87804 INFLUENZA ASSAY W/OPTIC: CPT

## 2025-03-03 NOTE — PROGRESS NOTES
Subjective   HPI    Had a sinus infection a month ago  Not pregnant or breast feeding    Patient still very congested   Patient had a fever 3 days ago that broke the next day  However still having dry cough  At night would have more discharge   Hurts very bad when she coughs   Head very congested, lungs hurt, everything hurts    2 of kids 2 months ago had pneumonia and one had finished last dose of antibiotics.  2 months ago son had influenza A  Patient works for a school.   No sore throat     Patient has been doing tylenol and ibuprofen alternating every 4 hours     Patient Active Problem List   Diagnosis    Gluten intolerance    Obesity (BMI 35.0-39.9 without comorbidity)    Seasonal allergic rhinitis, unspecified chronicity, unspecified trigger    Sprain of neck    Segmental dysfunction of cervical region    Segmental dysfunction of thoracic region    Tension headache    Segmental dysfunction of sacral region    Segmental dysfunction of lumbar region    Lumbago    HOLLY (generalized anxiety disorder)    Moderate major depression (H)    Injury of jaw, initial encounter ([facility])    Closed fracture of left ankle with delayed healing    Migraine with aura and without status migrainosus, not intractable    Tail bone pain    Pelvic cramping    Dysmenorrhea    Menorrhagia with regular cycle    Class 2 obesity due to excess calories without serious comorbidity with body mass index (BMI) of 38.0 to 38.9 in adult     Past Surgical History:   Procedure Laterality Date    LAPAROSCOPIC SALPINGECTOMY Bilateral 8/12/2019    Procedure: SALPINGECTOMY, LAPAROSCOPIC BILATERAL;  Surgeon: Dwayne Campos MD;  Location: [facility]    NO HISTORY OF SURGERY         Social History     Tobacco Use    Smoking status: Never     Passive exposure: Never    Smokeless tobacco: Never   Substance Use Topics    Alcohol use: Yes     Comment: 1 per week     Family History   Problem Relation Age of Onset    Arthritis Mother     No Known Problems Father      No Known Problems Maternal Grandmother     Diabetes Maternal Grandfather         Type II    Hyperlipidemia Paternal Grandmother     No Known Problems Paternal Grandfather     Depression Brother         6 yrs younger    Anxiety Disorder Brother         on meds    No Known Problems Son     No Known Problems Son            Reviewed and updated as needed this visit by Provider    Allergies                Review of Systems   Constitutional, HEENT, cardiovascular, pulmonary, gi and gu systems are negative, except as otherwise noted.       Objective   Reported vitals:  There were no vitals taken for this visit.   healthy, alert, and no distress  PSYCH: Alert and oriented times 3; coherent speech, normal   rate and volume, able to articulate logical thoughts, able   to abstract reason, no tangential thoughts, no hallucinations   or delusions  Her affect is normal  RESP: No cough, no audible wheezing, able to talk in full sentences  Additional exam:  None   Remainder of exam unable to be completed due to telephone visits    Diagnostic Test Results:  Labs reviewed in Epic  none         Assessment/Plan:      ICD-10-CM    1. Febrile illness  R50.9 Influenza A & B Antigen     XR Chest 2 Views      Advised to stay home until fever free without help of tylenol or ibuprofen  Patient states she took home covid test and was negative  Rule out flu  Rule out pneumonia   Patient requested chest xray - aware that early pneumonia may not show up on xray and if symptoms persist recommend in person evaluation  Come in person if symptoms persist, asap or sooner if worsening symptoms   Joined the call at 3/3/2025, 12:05:59 pm.  Left the call at 3/3/2025, 12:12:22 pm.  You were on the call for 6 minutes 22 seconds  [place]  Patient work  Provider home    Annalee Arthur MD

## 2025-04-06 SDOH — HEALTH STABILITY: PHYSICAL HEALTH: ON AVERAGE, HOW MANY DAYS PER WEEK DO YOU ENGAGE IN MODERATE TO STRENUOUS EXERCISE (LIKE A BRISK WALK)?: 1 DAY

## 2025-04-06 SDOH — HEALTH STABILITY: PHYSICAL HEALTH: ON AVERAGE, HOW MANY MINUTES DO YOU ENGAGE IN EXERCISE AT THIS LEVEL?: 30 MIN

## 2025-04-06 ASSESSMENT — PATIENT HEALTH QUESTIONNAIRE - PHQ9
10. IF YOU CHECKED OFF ANY PROBLEMS, HOW DIFFICULT HAVE THESE PROBLEMS MADE IT FOR YOU TO DO YOUR WORK, TAKE CARE OF THINGS AT HOME, OR GET ALONG WITH OTHER PEOPLE: SOMEWHAT DIFFICULT
SUM OF ALL RESPONSES TO PHQ QUESTIONS 1-9: 7
SUM OF ALL RESPONSES TO PHQ QUESTIONS 1-9: 7

## 2025-04-06 ASSESSMENT — SOCIAL DETERMINANTS OF HEALTH (SDOH): HOW OFTEN DO YOU GET TOGETHER WITH FRIENDS OR RELATIVES?: ONCE A WEEK

## 2025-04-07 ENCOUNTER — OFFICE VISIT (OUTPATIENT)
Dept: FAMILY MEDICINE | Facility: CLINIC | Age: 40
End: 2025-04-07
Payer: COMMERCIAL

## 2025-04-07 VITALS
OXYGEN SATURATION: 98 % | SYSTOLIC BLOOD PRESSURE: 112 MMHG | BODY MASS INDEX: 40.73 KG/M2 | TEMPERATURE: 97.8 F | RESPIRATION RATE: 16 BRPM | DIASTOLIC BLOOD PRESSURE: 74 MMHG | HEIGHT: 69 IN | HEART RATE: 75 BPM | WEIGHT: 275 LBS

## 2025-04-07 DIAGNOSIS — F41.1 GAD (GENERALIZED ANXIETY DISORDER): ICD-10-CM

## 2025-04-07 DIAGNOSIS — K90.41 GLUTEN INTOLERANCE: ICD-10-CM

## 2025-04-07 DIAGNOSIS — Z28.21 INFLUENZA VACCINATION DECLINED: ICD-10-CM

## 2025-04-07 DIAGNOSIS — N39.3 FEMALE STRESS INCONTINENCE: ICD-10-CM

## 2025-04-07 DIAGNOSIS — Z13.220 LIPID SCREENING: ICD-10-CM

## 2025-04-07 DIAGNOSIS — Z78.9 ADVISED ABOUT MANAGEMENT OF WEIGHT: ICD-10-CM

## 2025-04-07 DIAGNOSIS — Z28.21 COVID-19 VACCINATION DECLINED: ICD-10-CM

## 2025-04-07 DIAGNOSIS — N92.0 MENORRHAGIA WITH REGULAR CYCLE: ICD-10-CM

## 2025-04-07 DIAGNOSIS — J30.2 SEASONAL ALLERGIC RHINITIS, UNSPECIFIED TRIGGER: ICD-10-CM

## 2025-04-07 DIAGNOSIS — N94.6 DYSMENORRHEA: ICD-10-CM

## 2025-04-07 DIAGNOSIS — F32.1 MODERATE MAJOR DEPRESSION (H): ICD-10-CM

## 2025-04-07 DIAGNOSIS — Z00.00 ROUTINE GENERAL MEDICAL EXAMINATION AT A HEALTH CARE FACILITY: Primary | ICD-10-CM

## 2025-04-07 DIAGNOSIS — E66.01 MORBID OBESITY (H): ICD-10-CM

## 2025-04-07 DIAGNOSIS — Z13.1 SCREENING FOR DIABETES MELLITUS: ICD-10-CM

## 2025-04-07 DIAGNOSIS — Z13.29 SCREENING FOR THYROID DISORDER: ICD-10-CM

## 2025-04-07 DIAGNOSIS — H69.92 DYSFUNCTION OF LEFT EUSTACHIAN TUBE: ICD-10-CM

## 2025-04-07 PROBLEM — S13.9XXA SPRAIN OF NECK: Status: RESOLVED | Noted: 2017-10-23 | Resolved: 2025-04-07

## 2025-04-07 PROBLEM — R10.2 PELVIC CRAMPING: Status: RESOLVED | Noted: 2023-09-22 | Resolved: 2025-04-07

## 2025-04-07 PROBLEM — S82.892G CLOSED FRACTURE OF LEFT ANKLE WITH DELAYED HEALING: Status: RESOLVED | Noted: 2021-04-01 | Resolved: 2025-04-07

## 2025-04-07 PROBLEM — S09.93XA: Status: RESOLVED | Noted: 2019-11-05 | Resolved: 2025-04-07

## 2025-04-07 PROCEDURE — 3074F SYST BP LT 130 MM HG: CPT | Performed by: STUDENT IN AN ORGANIZED HEALTH CARE EDUCATION/TRAINING PROGRAM

## 2025-04-07 PROCEDURE — 99395 PREV VISIT EST AGE 18-39: CPT | Performed by: STUDENT IN AN ORGANIZED HEALTH CARE EDUCATION/TRAINING PROGRAM

## 2025-04-07 PROCEDURE — 96127 BRIEF EMOTIONAL/BEHAV ASSMT: CPT | Performed by: STUDENT IN AN ORGANIZED HEALTH CARE EDUCATION/TRAINING PROGRAM

## 2025-04-07 PROCEDURE — 99215 OFFICE O/P EST HI 40 MIN: CPT | Mod: 25 | Performed by: STUDENT IN AN ORGANIZED HEALTH CARE EDUCATION/TRAINING PROGRAM

## 2025-04-07 PROCEDURE — 1125F AMNT PAIN NOTED PAIN PRSNT: CPT | Performed by: STUDENT IN AN ORGANIZED HEALTH CARE EDUCATION/TRAINING PROGRAM

## 2025-04-07 PROCEDURE — 3078F DIAST BP <80 MM HG: CPT | Performed by: STUDENT IN AN ORGANIZED HEALTH CARE EDUCATION/TRAINING PROGRAM

## 2025-04-07 RX ORDER — FLUTICASONE PROPIONATE 50 MCG
2 SPRAY, SUSPENSION (ML) NASAL DAILY
Qty: 16 G | Refills: 4 | Status: SHIPPED | OUTPATIENT
Start: 2025-04-07

## 2025-04-07 ASSESSMENT — PAIN SCALES - GENERAL: PAINLEVEL_OUTOF10: MILD PAIN (3)

## 2025-04-07 NOTE — PROGRESS NOTES
"Preventive Care Visit  [facility]  Marizol Escobedo DO, Family Medicine  Apr 7, 2025      Assessment & Plan     Routine general medical examination at a health care facility  Recommend annual wellness visits, screens, and immunizations as indicated.    Advised about management of weight  Morbid obesity (H)  Body mass index (BMI) of 40.0-44.9 in adult (H)  Estimated body mass index is 40.73 kg/m  as calculated from the following:    Height as of this encounter: 1.75 m (5' 8.9\").    Weight as of this encounter: 124.7 kg (275 lb).   Weight management plan: Discussed healthy diet and exercise guidelines patient to start lifestyle changes and tracking of diet and exercise, follow-up planned in 2 months to assess progress and decide on medication management for continued weight loss.   - PRIMARY CARE FOLLOW-UP SCHEDULING; Future  - Hemoglobin A1c; Future  - ALT; Future  - Lipid panel reflex to direct LDL Fasting; Future  - TSH with free T4 reflex; Future      Screening for diabetes mellitus  Will return later today for draw.   - Hemoglobin A1c; Future    Lipid screening  Will return later today for draw.   - Lipid panel reflex to direct LDL Fasting; Future    Screening for thyroid disorder  Will return later today for draw.   - TSH with free T4 reflex; Future    Dysmenorrhea  Menorrhagia with regular cycle  Female stress incontinence  Long term issues with menses related pain, heavy bleeding. Prior attempts at control with IUD and OCP's with no benefit. Had discussed hyst, but she was not ready. Issues with urination since she was a child and now with stress incontinence as primary problem since children. Inclined to referral to discuss both issues with potential interest in management of incontinence at same time as potential definitive management for cycle issues.   - Adult Uro/Gyn  Referral; Future    Moderate major depression (H)  HOLLY (generalized anxiety disorder)  Patient chose " to discontinue Zoloft given minimal benefet. Continues with therapy. Does  not desire to pursue continued medication at this time and feelsmanageable with current system.    Seasonal allergic rhinitis, unspecified trigger  Dysfunction of left eustachian tube  Continue during trigger seasons. Stable.  - fluticasone (FLONASE) 50 MCG/ACT nasal spray; Spray 2 sprays into both nostrils daily.    Gluten intolerance  Stable.    COVID-19 vaccination declined  Influenza vaccination declined        Counseling  Appropriate preventive services were addressed with this patient via screening, questionnaire, or discussion as appropriate for fall prevention, nutrition, physical activity, Tobacco-use cessation, social engagement, weight loss and cognition.  Checklist reviewing preventive services available has been given to the patient.  Reviewed patient's diet, addressing concerns and/or questions.   She is at risk for lack of exercise and has been provided with information to increase physical activity for the benefit of her well-being.   She is at risk for psychosocial distress and has been provided with information to reduce risk.   The patient's PHQ-9 score is consistent with mild depression. She was provided with information regarding depression.     A total of 40 minutes were spent on weight management, stress incontinence, menorrhagia/dysmennorhea on the day of the encounter in addition to the standard prevent/AWV items for: chart review, history, assessment, exam, results review, documentation and discussing the assessment and plan as above with the patient.      Lucille Lynn is a 39 year old, presenting for the following:  Physical        4/7/2025     6:53 AM   Additional Questions   Roomed by Miya LEUNG          HPI     Has been off Zoloft at least 3 months. Didn't feel it was doing enough to continue. Has been doing therapy and feels this has been able to hekp her feel normal and be able to do things she needs to do.      Hs an exciting job change coming. [place].     Periods have always been a big issue since she had kids. They are super heavy when they happen, very heavy with clots, though very regular. They had discussed a hysterectomy a couple of years ago, but she wasn't ready at that time. Tried a Mirena for 18 months and was spotting daily, so that didn't work for her. Pill form didn't do much to help.     Lots of stress incontinence. Tried PFPT, got 5-6 sessions in. Never got fulls sessions in, so not sure she got full benefit. Had issues when she was younger too and was wetting bed then.     Migraines not common, maybe every other month. Seems tied to gluten and sinus infections. Managing stress. Was not related to menses/cycle.     Weight issues concern.    Motion sickness: when she flies she needs both zofran and scopolamine to manage. Elodiajeana worked through this with her.       Advance Care Planning  Patient does not have a Health Care Directive: Discussed advance care planning with patient; information given to patient to review.      4/6/2025   General Health   How would you rate your overall physical health? (!) FAIR   Feel stress (tense, anxious, or unable to sleep) Rather much   (!) STRESS CONCERN      4/6/2025   Nutrition   Three or more servings of calcium each day? (!) NO   Diet: Gluten-free/reduced   How many servings of fruit and vegetables per day? (!) 0-1   How many sweetened beverages each day? 0-1         4/6/2025   Exercise   Days per week of moderate/strenous exercise 1 day   Average minutes spent exercising at this level 30 min   (!) EXERCISE CONCERN        4/6/2025   Social Factors   Frequency of gathering with friends or relatives Once a week   Worry food won't last until get money to buy more No   Food not last or not have enough money for food? No   Do you have housing? (Housing is defined as stable permanent housing and does not include staying ouside in a car, in a  tent, in an abandoned building, in an overnight shelter, or couch-surfing.) Yes   Are you worried about losing your housing? No   Lack of transportation? No   Unable to get utilities (heat,electricity)? No         4/6/2025   Dental   Dentist two times every year? Yes     Today's PHQ-9 Score:       4/6/2025     6:30 AM   PHQ-9 SCORE   PHQ-9 Total Score MyChart 7 (Mild depression)   PHQ-9 Total Score 7        Patient-reported         4/6/2025   Substance Use   Alcohol more than 3/day or more than 7/wk No   Do you use any other substances recreationally? No     Social History     Tobacco Use    Smoking status: Never     Passive exposure: Never    Smokeless tobacco: Never   Vaping Use    Vaping status: Never Used   Substance Use Topics    Alcohol use: Yes     Comment: 1 per week    Drug use: No        Mammogram Screening - Patient under 40 years of age: Routine Mammogram Screening not recommended.         4/6/2025   STI Screening   New sexual partner(s) since last STI/HIV test? No     History of abnormal Pap smear: No - age 30- 64 PAP with HPV every 5 years recommended        Latest Ref Rng & Units 3/2/2022     7:35 AM 6/21/2017     4:25 PM 6/21/2017     3:49 PM   PAP / HPV   PAP  Negative for Intraepithelial Lesion or Malignancy (NILM)      PAP (Historical)   NIL     HPV 16 DNA Negative Negative   Negative    HPV 18 DNA Negative Negative   Negative    Other HR HPV Negative Negative   Negative          4/6/2025   Contraception/Family Planning   Questions about contraception or family planning No     Reviewed and updated as needed this visit by Provider                    Past Medical History:   Diagnosis Date    Closed fracture of left ankle with delayed healing 04/2021    Dislocation of symphysis pubis, initial encounter 11/01/2017    Mirena placed 3/2/2022 02/09/2021    NO ACTIVE PROBLEMS (aka NONE)     Pelvic cramping 09/22/2023     Past Surgical History:   Procedure Laterality Date    LAPAROSCOPIC SALPINGECTOMY  Bilateral 2019    Procedure: SALPINGECTOMY, LAPAROSCOPIC BILATERAL;  Surgeon: Dwayne Campos MD;  Location: [facility] OR    NO HISTORY OF SURGERY       OB History    Para Term  AB Living   3 3 3 0 0 2   SAB IAB Ectopic Multiple Live Births   0 0 0 0 2      # Outcome Date GA Lbr Marlo/2nd Weight Sex Type Anes PTL Lv   3 Term 19 39w6d 03:15 / 00:11 3.11 kg (6 lb 13.7 oz) F Vag-Spont EPI N JESSIE      Name: PEYTON,FEMALE-ELIDA      Apgar1: 9  Apgar5: 9   2 Term 17 40w4d 06:00 / 00:44 3.147 kg (6 lb 15 oz) M Vag-Spont EPI N JESSIE      Name: Kenton      Apgar1: 9  Apgar5: 9   1 Term 05/08/15 40w0d  3.317 kg (7 lb 5 oz) M   N       Name: Tabby      Obstetric Comments   JACKELIN:5/7/15 by 8 4/7 week ultrasound    to Demetrio.  This will be their first delivery at [facility].     Labs reviewed in EPIC  BP Readings from Last 3 Encounters:   25 112/74   24 110/70   24 (!) 167/96    Wt Readings from Last 3 Encounters:   25 124.7 kg (275 lb)   24 120.2 kg (265 lb)   24 122.9 kg (271 lb)          Patient Active Problem List   Diagnosis    Gluten intolerance    Body mass index (BMI) of 40.0-44.9 in adult (H)    Seasonal allergic rhinitis, unspecified chronicity, unspecified trigger    Sprain of neck    Segmental dysfunction of cervical region    Segmental dysfunction of thoracic region    Tension headache    Segmental dysfunction of sacral region    Segmental dysfunction of lumbar region    Lumbago    HOLLY (generalized anxiety disorder)    Moderate major depression (H)    Injury of jaw, initial encounter ([facility])    Closed fracture of left ankle with delayed healing    Migraine with aura and without status migrainosus, not intractable    Tail bone pain    Dysmenorrhea    Menorrhagia with regular cycle    Morbid obesity (H)     Past Surgical History:   Procedure Laterality Date    LAPAROSCOPIC SALPINGECTOMY Bilateral 2019    Procedure: SALPINGECTOMY, LAPAROSCOPIC  BILATERAL;  Surgeon: Dwayne Campos MD;  Location: [facility] OR    NO HISTORY OF SURGERY         Social History     Tobacco Use    Smoking status: Never     Passive exposure: Never    Smokeless tobacco: Never   Substance Use Topics    Alcohol use: Yes     Comment: 1 per week     Family History   Problem Relation Age of Onset    Arthritis Mother     No Known Problems Father     No Known Problems Maternal Grandmother     Diabetes Maternal Grandfather         Type II    Hyperlipidemia Paternal Grandmother     No Known Problems Paternal Grandfather     Depression Brother         6 yrs younger    Anxiety Disorder Brother         on meds    No Known Problems Son     No Known Problems Son          Current Outpatient Medications   Medication Sig Dispense Refill    aspirin (ASA) 325 MG EC tablet Take 325 mg by mouth.      cetirizine (ZYRTEC) 10 MG tablet Take 1 tablet (10 mg) by mouth daily. 30 tablet 0    dextromethorphan-guaiFENesin (MUCINEX DM)  MG 12 hr tablet Take 1 tablet by mouth every 12 hours.      fluticasone (FLONASE) 50 MCG/ACT nasal spray Spray 2 sprays into both nostrils daily 16 g 3    ibuprofen (ADVIL/MOTRIN) 200 MG capsule Take 200 mg by mouth every 4 hours as needed for fever.      ondansetron (ZOFRAN ODT) 4 MG ODT tab Take 1 tablet (4 mg) by mouth every 6 hours as needed for nausea 30 tablet 1    scopolamine (TRANSDERM) 1 MG/3DAYS 72 hr patch Place 1 patch onto the skin every 72 hours 4 patch 1     Allergies   Allergen Reactions    Gluten Meal      Sinus infections and migraines.     Recent Labs   Lab Test 09/19/20  0938 01/31/20  1329 09/17/19  0925 09/24/18  1809   LDL 81  --   --   --    HDL 56  --   --   --    TRIG 94  --   --   --    ALT  --  39  --   --    CR  --  0.74  --   --    GFRESTIMATED  --  >90  --   --    GFRESTBLACK  --  >90  --   --    POTASSIUM  --  3.8  --   --    TSH  --   --  1.28 1.25         Review of Systems  Negative unless otherwise specified per HPI.       Objective   "  Exam  /74   Pulse 75   Temp 97.8  F (36.6  C) (Temporal)   Resp 16   Ht 1.75 m (5' 8.9\")   Wt 124.7 kg (275 lb)   LMP 03/13/2025   SpO2 98%   BMI 40.73 kg/m     Estimated body mass index is 40.73 kg/m  as calculated from the following:    Height as of this encounter: 1.75 m (5' 8.9\").    Weight as of this encounter: 124.7 kg (275 lb).    Physical Exam  GENERAL: alert and no distress  EYES: Eyes grossly normal to inspection, PERRL and conjunctivae and sclerae normal  HENT: nose and mouth without ulcers or lesions  NECK: no adenopathy, no asymmetry, masses, or scars  RESP: lungs clear to auscultation - no rales, rhonchi or wheezes, normal rate and effort  CV: regular rate and rhythm, normal S1 S2, no S3 or S4, no murmur, click or rub, no peripheral edema  ABDOMEN: soft, nontender, obese, no hepatosplenomegaly, no masses and bowel sounds normal  MS: no gross musculoskeletal defects noted, no edema  SKIN: no suspicious lesions or rashes  NEURO: Normal strength and tone, mentation intact and speech normal  PSYCH: mentation appears normal, affect normal/bright        Signed Electronically by: Marizol Escobedo DO    Answers submitted by the patient for this visit:  Patient Health Questionnaire (Submitted on 4/6/2025)  If you checked off any problems, how difficult have these problems made it for you to do your work, take care of things at home, or get along with other people?: Somewhat difficult  PHQ9 TOTAL SCORE: 7    "

## 2025-04-07 NOTE — PATIENT INSTRUCTIONS
Patient Education   Preventive Care Advice   This is general advice given by our system to help you stay healthy. However, your care team may have specific advice just for you. Please talk to your care team about your preventive care needs.  Nutrition  Eat 5 or more servings of fruits and vegetables each day.  Try wheat bread, brown rice and whole grain pasta (instead of white bread, rice, and pasta).  Get enough calcium and vitamin D. Check the label on foods and aim for 100% of the RDA (recommended daily allowance).  Lifestyle  Exercise at least 150 minutes each week  (30 minutes a day, 5 days a week).  Do muscle strengthening activities 2 days a week. These help control your weight and prevent disease.  No smoking.  Wear sunscreen to prevent skin cancer.  Have a dental exam and cleaning every 6 months.  Yearly exams  See your health care team every year to talk about:  Any changes in your health.  Any medicines your care team has prescribed.  Preventive care, family planning, and ways to prevent chronic diseases.  Shots (vaccines)   HPV shots (up to age 26), if you've never had them before.  Hepatitis B shots (up to age 59), if you've never had them before.  COVID-19 shot: Get this shot when it's due.  Flu shot: Get a flu shot every year.  Tetanus shot: Get a tetanus shot every 10 years.  Pneumococcal, hepatitis A, and RSV shots: Ask your care team if you need these based on your risk.  Shingles shot (for age 50 and up)  General health tests  Diabetes screening:  Starting at age 35, Get screened for diabetes at least every 3 years.  If you are younger than age 35, ask your care team if you should be screened for diabetes.  Cholesterol test: At age 39, start having a cholesterol test every 5 years, or more often if advised.  Bone density scan (DEXA): At age 50, ask your care team if you should have this scan for osteoporosis (brittle bones).  Hepatitis C: Get tested at least once in your life.  STIs (sexually  transmitted infections)  Before age 24: Ask your care team if you should be screened for STIs.  After age 24: Get screened for STIs if you're at risk. You are at risk for STIs (including HIV) if:  You are sexually active with more than one person.  You don't use condoms every time.  You or a partner was diagnosed with a sexually transmitted infection.  If you are at risk for HIV, ask about PrEP medicine to prevent HIV.  Get tested for HIV at least once in your life, whether you are at risk for HIV or not.  Cancer screening tests  Cervical cancer screening: If you have a cervix, begin getting regular cervical cancer screening tests starting at age 21.  Breast cancer scan (mammogram): If you've ever had breasts, begin having regular mammograms starting at age 40. This is a scan to check for breast cancer.  Colon cancer screening: It is important to start screening for colon cancer at age 45.  Have a colonoscopy test every 10 years (or more often if you're at risk) Or, ask your provider about stool tests like a FIT test every year or Cologuard test every 3 years.  To learn more about your testing options, visit:   .  For help making a decision, visit:   https://bit.ly/mp42683.  Prostate cancer screening test: If you have a prostate, ask your care team if a prostate cancer screening test (PSA) at age 55 is right for you.  Lung cancer screening: If you are a current or former smoker ages 50 to 80, ask your care team if ongoing lung cancer screenings are right for you.  For informational purposes only. Not to replace the advice of your health care provider. Copyright   2023 [employer]. All rights reserved. Clinically reviewed by the [employer] [facility]. [employer] 451508 - REV 01/24.  Your Health Risk Assessment indicates you feel you are not in good health    A healthy lifestyle helps keep the body fit and the mind alert. It helps protect you from disease, helps you fight disease, and  helps prevent chronic disease (disease that doesn't go away) from getting worse. This is important as you get older and begin to notice twinges in muscles and joints and a decline in the strength and stamina you once took for granted. A healthy lifestyle includes good healthcare, good nutrition, weight control, recreation, and regular exercise. Avoid harmful substances and do what you can to keep safe. Another part of a healthy lifestyle is stay mentally active and socially involved.    Good healthcare   Have a wellness visit every year.   If you have new symptoms, let us know right away. Don't wait until the next checkup.   Take medicines exactly as prescribed and keep your medicines in a safe place. Tell us if your medicine causes problems.   Healthy diet and weight control   Eat 3 or 4 small, nutritious, low-fat, high-fiber meals a day. Include a variety of fruits, vegetables, and whole-grain foods.   Make sure you get enough calcium in your diet. Calcium, vitamin D, and exercise help prevent osteoporosis (bone thinning).   If you live alone, try eating with others when you can. That way you get a good meal and have company while you eat it.   Try to keep a healthy weight. If you eat more calories than your body uses for energy, it will be stored as fat and you will gain weight.     Recreation   Recreation is not limited to sports and team events. It includes any activity that provides relaxation, interest, enjoyment, and exercise. Recreation provides an outlet for physical, mental, and social energy. It can give a sense of worth and achievement. It can help you stay healthy.    Mental Exercise and Social Involvement  Mental and emotional health is as important as physical health. Keep in touch with friends and family. Stay as active as possible. Continue to learn and challenge yourself.   Things you can do to stay mentally active are:  Learn something new, like a foreign language or musical instrument.   Play  "SCRABBLE or do crossword puzzles. If you cannot find people to play these games with you at home, you can play them with others on your computer through the Internet.   Join a games club--anything from card games to chess or checkers or lawn bowling.   Start a new hobby.   Go back to school.   Volunteer.   Read.   Keep up with world events.  Eating Healthy Foods: Care Instructions  With every meal, you can make healthy food choices. Try to eat a variety of fruits, vegetables, whole grains, lean proteins, and low-fat dairy products. This can help you get the right balance of nutrients, including vitamins and minerals. Small changes add up over time. You can start by adding one healthy food to your meals each day.    Try to make half your plate fruits and vegetables, one-fourth whole grains, and one-fourth lean proteins. Try including dairy with your meals.   Eat more fruits and vegetables. Try to have them with most meals and snacks.   Foods for healthy eating        Fruits   These can be fresh, frozen, canned, or dried.  Try to choose whole fruit rather than fruit juice.  Eat a variety of colors.        Vegetables   These can be fresh, frozen, canned, or dried.  Beans, peas, and lentils count too.        Whole grains   Choose whole-grain breads, cereals, and noodles.  Try brown rice.        Lean proteins   These can include lean meat, poultry, fish, and eggs.  You can also have tofu, beans, peas, lentils, nuts, and seeds.        Dairy   Try milk, yogurt, and cheese.  Choose low-fat or fat-free when you can.  If you need to, use lactose-free milk or fortified plant-based milk products, such as soy milk.        Water   Drink water when you're thirsty.  Limit sugar-sweetened drinks, including soda, fruit drinks, and sports drinks.  Where can you learn more?  Go to https://www.healthwise.net/patiented  Enter T756 in the search box to learn more about \"Eating Healthy Foods: Care Instructions.\"  Current as of: October 7, " 2024  Content Version: 14.4    5137-4955 [employer].   Care instructions adapted under license by your healthcare professional. If you have questions about a medical condition or this instruction, always ask your healthcare professional. [employer] disclaims any warranty or liability for your use of this information.    Learning About Stress  What is stress?     Stress is your body's response to a hard situation. Your body can have a physical, emotional, or mental response. Stress is a fact of life for most people, and it affects everyone differently. What causes stress for you may not be stressful for someone else.  A lot of things can cause stress. You may feel stress when you go on a job interview, take a test, or run a race. This kind of short-term stress is normal and even useful. It can help you if you need to work hard or react quickly. For example, stress can help you finish an important job on time.  Long-term stress is caused by ongoing stressful situations or events. Examples of long-term stress include long-term health problems, ongoing problems at work, or conflicts in your family. Long-term stress can harm your health.  How does stress affect your health?  When you are stressed, your body responds as though you are in danger. It makes hormones that speed up your heart, make you breathe faster, and give you a burst of energy. This is called the fight-or-flight stress response. If the stress is over quickly, your body goes back to normal and no harm is done.  But if stress happens too often or lasts too long, it can have bad effects. Long-term stress can make you more likely to get sick, and it can make symptoms of some diseases worse. If you tense up when you are stressed, you may develop neck, shoulder, or low back pain. Stress is linked to high blood pressure and heart disease.  Stress also harms your emotional health. It can make you garcía, tense, or depressed. Your  relationships may suffer, and you may not do well at work or school.  What can you do to manage stress?  You can try these things to help manage stress:   Do something active. Exercise or activity can help reduce stress. Walking is a great way to get started. Even everyday activities such as housecleaning or yard work can help.  Try yoga or geoffrey chi. These techniques combine exercise and meditation. You may need some training at first to learn them.  Do something you enjoy. For example, listen to music or go to a movie. Practice your hobby or do volunteer work.  Meditate. This can help you relax, because you are not worrying about what happened before or what may happen in the future.  Do guided imagery. Imagine yourself in any setting that helps you feel calm. You can use online videos, books, or a teacher to guide you.  Do breathing exercises. For example:  From a standing position, bend forward from the waist with your knees slightly bent. Let your arms dangle close to the floor.  Breathe in slowly and deeply as you return to a standing position. Roll up slowly and lift your head last.  Hold your breath for just a few seconds in the standing position.  Breathe out slowly and bend forward from the waist.  Let your feelings out. Talk, laugh, cry, and express anger when you need to. Talking with supportive friends or family, a counselor, or a mykel leader about your feelings is a healthy way to relieve stress. Avoid discussing your feelings with people who make you feel worse.  Write. It may help to write about things that are bothering you. This helps you find out how much stress you feel and what is causing it. When you know this, you can find better ways to cope.  What can you do to prevent stress?  You might try some of these things to help prevent stress:  Manage your time. This helps you find time to do the things you want and need to do.  Get enough sleep. Your body recovers from the stresses of the day while  "you are sleeping.  Get support. Your family, friends, and community can make a difference in how you experience stress.  Limit your news feed. Avoid or limit time on social media or news that may make you feel stressed.  Do something active. Exercise or activity can help reduce stress. Walking is a great way to get started.  Where can you learn more?  Go to https://www.[employer].net/patiented  Enter N032 in the search box to learn more about \"Learning About Stress.\"  Current as of: October 24, 2024  Content Version: 14.4    8745-5143 [employer].   Care instructions adapted under license by your healthcare professional. If you have questions about a medical condition or this instruction, always ask your healthcare professional. [employer] disclaims any warranty or liability for your use of this information.    Learning About Depression Screening  What is depression screening?  Depression screening is a way to see if you have depression symptoms. It may be done by a doctor or counselor. It's often part of a routine checkup. That's because your mental health is just as important as your physical health.  Depression is a mental health condition that affects how you feel, think, and act. You may:  Have less energy.  Lose interest in your daily activities.  Feel sad and grouchy for a long time.  Depression is very common. It affects people of all ages.  Many things can lead to depression. Some people become depressed after they have a stroke or find out they have a major illness like cancer or heart disease. The death of a loved one or a breakup may lead to depression. It can run in families. Most experts believe that a combination of inherited genes and stressful life events can cause it.  What happens during screening?  You may be asked to fill out a form about your depression symptoms. You and the doctor will discuss your answers. The doctor may ask you more questions to learn more about how " "you think, act, and feel.  What happens after screening?  If you have symptoms of depression, your doctor will talk to you about your options.  Doctors usually treat depression with medicines or counseling. Often, combining the two works best. Many people don't get help because they think that they'll get over the depression on their own. But people with depression may not get better unless they get treatment.  The cause of depression is not well understood. There may be many factors involved. But if you have depression, it's not your fault.  A serious symptom of depression is thinking about death or suicide. If you or someone you care about talks about this or about feeling hopeless, get help right away.  It's important to know that depression can be treated. Medicine, counseling, and self-care may help.  Where can you learn more?  Go to https://www.[employer].net/patiented  Enter T185 in the search box to learn more about \"Learning About Depression Screening.\"  Current as of: July 31, 2024  Content Version: 14.4    2737-2606 [employer].   Care instructions adapted under license by your healthcare professional. If you have questions about a medical condition or this instruction, always ask your healthcare professional. [employer] disclaims any warranty or liability for your use of this information.       "

## 2025-04-08 ENCOUNTER — LAB (OUTPATIENT)
Dept: LAB | Facility: CLINIC | Age: 40
End: 2025-04-08
Payer: COMMERCIAL

## 2025-04-08 DIAGNOSIS — Z13.1 SCREENING FOR DIABETES MELLITUS: ICD-10-CM

## 2025-04-08 DIAGNOSIS — E66.01 MORBID OBESITY (H): ICD-10-CM

## 2025-04-08 DIAGNOSIS — Z00.00 ROUTINE GENERAL MEDICAL EXAMINATION AT A HEALTH CARE FACILITY: ICD-10-CM

## 2025-04-08 DIAGNOSIS — Z13.29 SCREENING FOR THYROID DISORDER: ICD-10-CM

## 2025-04-08 DIAGNOSIS — Z78.9 ADVISED ABOUT MANAGEMENT OF WEIGHT: ICD-10-CM

## 2025-04-08 DIAGNOSIS — Z13.220 LIPID SCREENING: ICD-10-CM

## 2025-04-08 LAB
ALT SERPL W P-5'-P-CCNC: 20 U/L (ref 0–50)
CHOLEST SERPL-MCNC: 158 MG/DL
EST. AVERAGE GLUCOSE BLD GHB EST-MCNC: 117 MG/DL
FASTING STATUS PATIENT QL REPORTED: NO
HBA1C MFR BLD: 5.7 %
HDLC SERPL-MCNC: 44 MG/DL
LDLC SERPL CALC-MCNC: 86 MG/DL
NONHDLC SERPL-MCNC: 114 MG/DL
TRIGL SERPL-MCNC: 139 MG/DL
TSH SERPL DL<=0.005 MIU/L-ACNC: 1.87 UIU/ML (ref 0.3–4.2)

## 2025-04-08 PROCEDURE — 83036 HEMOGLOBIN GLYCOSYLATED A1C: CPT

## 2025-04-08 PROCEDURE — 84443 ASSAY THYROID STIM HORMONE: CPT

## 2025-04-08 PROCEDURE — 80061 LIPID PANEL: CPT

## 2025-04-08 PROCEDURE — 84460 ALANINE AMINO (ALT) (SGPT): CPT

## 2025-04-08 PROCEDURE — 36415 COLL VENOUS BLD VENIPUNCTURE: CPT

## 2025-05-08 ENCOUNTER — TELEPHONE (OUTPATIENT)
Dept: SCHEDULING | Facility: CLINIC | Age: 40
End: 2025-05-08

## 2025-05-08 ENCOUNTER — HOSPITAL ENCOUNTER (OUTPATIENT)
Dept: GENERAL RADIOLOGY | Facility: CLINIC | Age: 40
Discharge: HOME | End: 2025-05-08
Payer: COMMERCIAL

## 2025-05-08 ENCOUNTER — OFFICE VISIT (OUTPATIENT)
Dept: FAMILY MEDICINE | Facility: CLINIC | Age: 40
End: 2025-05-08

## 2025-05-08 ENCOUNTER — PATIENT OUTREACH (OUTPATIENT)
Dept: CARE COORDINATION | Facility: CLINIC | Age: 40
End: 2025-05-08

## 2025-05-08 VITALS
RESPIRATION RATE: 23 BRPM | HEART RATE: 75 BPM | OXYGEN SATURATION: 98 % | DIASTOLIC BLOOD PRESSURE: 84 MMHG | BODY MASS INDEX: 41.62 KG/M2 | WEIGHT: 281 LBS | HEIGHT: 69 IN | TEMPERATURE: 98 F | SYSTOLIC BLOOD PRESSURE: 134 MMHG

## 2025-05-08 DIAGNOSIS — M79.644 PAIN OF FINGER OF RIGHT HAND: ICD-10-CM

## 2025-05-08 DIAGNOSIS — M79.644 PAIN OF FINGER OF RIGHT HAND: Primary | ICD-10-CM

## 2025-05-08 DIAGNOSIS — Z02.6 ENCOUNTER RELATED TO WORKER'S COMPENSATION CLAIM: ICD-10-CM

## 2025-05-08 PROCEDURE — 73130 X-RAY EXAM OF HAND: CPT | Mod: RT

## 2025-05-08 RX ORDER — CELECOXIB 100 MG/1
100 CAPSULE ORAL 2 TIMES DAILY
Qty: 30 CAPSULE | Refills: 0 | Status: SHIPPED | OUTPATIENT
Start: 2025-05-08

## 2025-05-08 ASSESSMENT — PAIN SCALES - GENERAL: PAINLEVEL_OUTOF10: MODERATE PAIN (4)

## 2025-05-08 NOTE — PATIENT INSTRUCTIONS
ASSESSMENT & PLAN    Hand Injury:  The patient  sustained a hand injury while restraining a child, resulting in pain and tenderness in the fingers, palm, and wrist. There is noticeable swelling, and the pain is exacerbated by typing and other hand movements.  Order an X-ray of the right hand to check for fractures or other injuries. Refer to hand therapy for rehabilitation exercises and stretches. Prescribe Celebrex for pain management, to be taken up to twice a day as needed. Recommend using lidocaine patches for pain relief and diclofenac gel for local pain and inflammation management. Advise against taking ibuprofen with Celebrex but allow Tylenol as needed. Implement work restrictions, including no lifting greater than 25 lbs for one week, until May 15, 2025.    Xray hand    Follow up with hand therapy    Celebrex twice a day as needed for pain    NO NSAIDS     Lidocaine patches for 12 hours on, 12 hours off     Ibuprofen as needed if you are able to take ibuprofen     Diclofenac gel 4 times a day scheduled OTC     Heat pack for 10 minutes followed by exercises 4-5 times a day     Follow up with any new, worsening, or persistent symptoms     Go to the ER in the case of an emergency

## 2025-05-08 NOTE — LETTER
[address] [address]  [phone]    2021      RE:  Leah Casarez  : 1985      To whom it may concern:    This patient may continue light duty mostly seated work in a fracture boot.     Sincerely,          Armond Suarez DPM                   Detail Level: Zone Detail Level: Detailed

## 2025-05-08 NOTE — PROGRESS NOTES
Assessment & Plan     Encounter related to worker's compensation claim  - Occupational Therapy  Referral; Future  - XR Hand Right G/E 3 Views; Future  - celecoxib (CELEBREX) 100 MG capsule; Take 1 capsule (100 mg) by mouth 2 times daily.    Pain of finger of right hand  - Occupational Therapy  Referral; Future  - XR Hand Right G/E 3 Views; Future  - celecoxib (CELEBREX) 100 MG capsule; Take 1 capsule (100 mg) by mouth 2 times daily.    ASSESSMENT & PLAN    Hand Injury:  The patient  sustained a hand injury while restraining a child, resulting in pain and tenderness in the fingers, palm, and wrist. There is noticeable swelling, and the pain is exacerbated by typing and other hand movements.  Order an X-ray of the right hand to check for fractures or other injuries. Refer to hand therapy for rehabilitation exercises and stretches. Prescribe Celebrex for pain management, to be taken up to twice a day as needed. Recommend using lidocaine patches for pain relief and diclofenac gel for local pain and inflammation management. Advise against taking ibuprofen with Celebrex but allow Tylenol as needed. Implement work restrictions, including no lifting greater than 25 lbs for one week, until May 15, 2025.            Karen Lynn is a 40 year old, presenting for the following health issues:  Hand Injury and Work Comp      5/8/2025     1:27 PM   Additional Questions   Roomed by Mary         5/8/2025     1:27 PM   Patient Reported Additional Medications   Patient reports taking the following new medications none     Hand Injury    History of Present Illness       Reason for visit:  Work injury  Symptom onset:  1-3 days ago  Symptoms include:  Wrist and finger injury  Symptom intensity:  Moderate  Symptom progression:  Staying the same  Had these symptoms before:  No  What makes it worse:  Using hand  What makes it better:  Not using She is missing 3 dose(s) of medications per week.      KAREN MURPHY  "40-year-old female presents with a hand injury sustained during a work-related incident. She describes the injury occurring while performing a physical hold on a child, during which the child bent her fingers backward, causing immediate pain. The injury primarily affects three fingers, with shooting pains experienced when attempting to type or bend them. She also reports pain in her palm and the outside of her wrist, which she noticed more prominently today. The incident occurred yesterday, and she has been managing the pain with icing, ibuprofen, and elevation.    She mentions having had pneumonia in February, but no further details about this condition are provided. She is in the process of transitioning to a new job after working in her current role for ten years. She expresses relief about the change, as her current position has become too demanding. 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    Additionally, she notes that today is her child's birthday, and he is turning 10 years old.  OBJECTIVE    Physical exam:    - Tenderness and swelling in the right hand    - Difficulty making a fist and pain upon opening the hand    Test results:    - X-ray of the right hand scheduled for May 8, 2025                  Objective    /84 (BP Location: Right arm, Patient Position: Sitting, Cuff Size: Adult Large)   Pulse 75   Temp 98  F (36.7  C) (Temporal)   Resp 23   Ht 1.75 m (5' 8.9\")   Wt 127.5 kg (281 lb)   LMP 03/13/2025   SpO2 98%   BMI 41.62 kg/m    Body mass index is 41.62 kg/m .  Physical Exam   GENERAL: alert and no distress  EYES: Eyes grossly normal to inspection, PERRL and conjunctivae and sclerae normal  MS: no gross musculoskeletal defects noted, no edema.  Tenderness to palpation over the right hand over the MCP of the middle finger close with mild edema  SKIN: no suspicious lesions or rashes  NEURO: Normal strength and tone, " mentation intact and speech normal  PSYCH: mentation appears normal, affect normal/bright    Lab on 04/08/2025   Component Date Value Ref Range Status    Estimated Average Glucose 04/08/2025 117 (H)  <117 mg/dL Final    Hemoglobin A1C 04/08/2025 5.7 (H)  <5.7 % Final    Normal <5.7%   Prediabetes 5.7-6.4%    Diabetes 6.5% or higher     Note: Adopted from ADA consensus guidelines.    ALT 04/08/2025 20  0 - 50 U/L Final    Cholesterol 04/08/2025 158  <200 mg/dL Final    Triglycerides 04/08/2025 139  <150 mg/dL Final    Direct Measure HDL 04/08/2025 44 (L)  >=50 mg/dL Final    LDL Cholesterol Calculated 04/08/2025 86  <100 mg/dL Final    Non HDL Cholesterol 04/08/2025 114  <130 mg/dL Final    Patient Fasting > 8hrs? 04/08/2025 No   Final    TSH 04/08/2025 1.87  0.30 - 4.20 uIU/mL Final     No results found for any visits on 05/08/25.  No results found.        Signed Electronically by: Kailee Yin PA-C
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
## 2025-05-13 ENCOUNTER — RESULTS FOLLOW-UP (OUTPATIENT)
Dept: FAMILY MEDICINE | Facility: CLINIC | Age: 40
End: 2025-05-13

## 2025-06-04 ENCOUNTER — VIRTUAL VISIT (OUTPATIENT)
Dept: FAMILY MEDICINE | Facility: CLINIC | Age: 40
End: 2025-06-04
Payer: COMMERCIAL

## 2025-06-04 DIAGNOSIS — Z76.89 ENCOUNTER FOR WEIGHT MANAGEMENT: Primary | ICD-10-CM

## 2025-06-04 PROCEDURE — 98006 SYNCH AUDIO-VIDEO EST MOD 30: CPT | Performed by: STUDENT IN AN ORGANIZED HEALTH CARE EDUCATION/TRAINING PROGRAM

## 2025-06-04 RX ORDER — TOPIRAMATE 50 MG/1
50 TABLET, FILM COATED ORAL 2 TIMES DAILY
Qty: 60 TABLET | Refills: 2 | Status: SHIPPED | OUTPATIENT
Start: 2025-06-04

## 2025-06-04 NOTE — PATIENT INSTRUCTIONS
Start 1/2 tablet of topiramate every morning for 1 week.  Week 2, take 1/2 tablet twice a day.  Week 3 you can either increase to full 50 mg tablet twice a day, or if you want to titrate up more slowly you can go to 1 tablet in the morning and half tablet in the evening for one week.   Then by week 4 go up to 1 tablet twice a day.     Guidance:  An average deficit of 500 kcal/day should result in an initial weight loss of ~0.5 kg/week (1 lb/week)  Even weight loss of 3-5% produces clinically meaningful health benefits  Regaining weight is common, schedule frequent follow up to assess and encourage progress  Keep a food log (using one of the apps below is most easily tracked and shared)  Limit high caloric beverages and processed foods first  Encourage accountability partners  Consider weight loss apps: My Fitness Pal*, Lose it, Noom    Dietary options:  Total calorie restriction is the most effective dietary intervention for weight loss  Generally, encourage patients to pursue whatever dietary intervention is most sustainable for their lifestyles  Mediterranean diet: high in fresh vegetables/fruits, whole grains, legumes, unsaturated fats, moderate diary and EtOH, less meat  Associated with decreased overall mortality and CV mortality, may decrease DM incidence independent of weight loss  DASH diet: 4-5 servings of fruit, 4-5 servings of vegetables, 2-3 servings of lowfat dairy per day, and <25 percent dietary intake from fat  Associated with decreases in SBP and DBP  There is mixed evidence for intermittent fasting, low carb, low fat, and high protein diets    Exercise: goal >30 min, 5-7 days per week  Ideally combination of aerobic and resistance training  Exercise alone is not sufficient for weight loss. Associated with maintaining weight loss.    Pharmacotherapy  BMI >=30 or >=27 with co-morbidity  Leads to significant short term weight loss, high rates of rebound, weight gain    Bariatric Surgery:  Indicated  for patients with BMI >40 or >35 with obesity related co-morbidity and failed lifestyle intervention  Referral to Surgical Weight Loss Clinic    Medical Weight Loss Clinic:  Can refer for patient with BMI >30 or >27 with obesity related co-morbidity  [facility]: Consider referral to [facility] program (in-person or telehealth options)

## 2025-06-04 NOTE — PROGRESS NOTES
Leah is a 40 year old who is being evaluated via a billable video visit.    How would you like to obtain your AVS? MyChart  If the video visit is dropped, the invitation should be resent by: Text to cell phone: [phone]  Will anyone else be joining your video visit? No      Assessment & Plan     Encounter for weight management  BMI 40.0-44.9, adult (H)  Discussed prior efforts for weight loss.   Reviewed various options of agents and the respective risks/benefits of each.  Patient desiring to pursue medication assisted assistance for weight loss with topiramate. Titration instruction sprovided in patient instructions.   Start 1/2 tablet of topiramate every morning for 1 week.  Week 2, take 1/2 tablet twice a day.  Week 3 can either increase to full 50 mg tablet twice a day, or if wanting to titrate up more slowly you can go to 1 tablet in the morning and half tablet in the evening for one week. Then by week 4 go up to 1 tablet twice a day.   Patient to keep weight, food, and measurement log.  Target weight loss of 5-10% body weight in 3-6 months  Will follow-up in 3 months by video visit.  - topiramate (TOPAMAX) 50 MG tablet; Take 1 tablet (50 mg) by mouth 2 times daily.  - PRIMARY CARE FOLLOW-UP SCHEDULING; Future    Follow-up   No follow-ups on file.     Follow-up Visit   Expected date:  Sep 04, 2025 (Approximate)      Follow Up Appointment Details:     Follow-up with whom?: Me    Follow-Up for what?: Other (Office Visit)    How?: Video                 Subjective   Leah is a 40 year old, presenting for the following health issues:  Weight Management        6/4/2025    10:55 AM   Additional Questions   Roomed by Miya LEUNG     History of Present Illness       Reason for visit:  Weight check up         Feels still at a stand still. Even with changes made. Hard to be super strict with having kids.  Getting over 10k steps a day. Drinks tons of water.   Getting 23-30 g protein per meal.   No changes in weight doing  "this.   No direct measurements, but clothes fitting the same.       Review of Systems  Negative unless otherwise specified per HPI.        Objective    Vitals - Patient Reported  Weight (Patient Reported): 124.7 kg (275 lb)  Height (Patient Reported): 172.7 cm (5' 8\")  BMI (Based on Pt Reported Ht/Wt): 41.81        Physical Exam   GENERAL: alert and no distress  EYES: Eyes grossly normal to inspection.  No discharge or erythema, or obvious scleral/conjunctival abnormalities.  RESP: No audible wheeze, cough, or visible cyanosis.    SKIN: Visible skin clear. No significant rash, abnormal pigmentation or lesions.  NEURO: Cranial nerves grossly intact.  Mentation and speech appropriate for age.  PSYCH: Appropriate affect, tone, and pace of words        Video-Visit Details    Type of service:  Video Visit   Originating Location (pt. Location): Home  Distant Location (provider location):  On-site  Platform used for Video Visit: Will  Signed Electronically by: Marizol Escobedo DO  "

## 2025-08-05 ENCOUNTER — PATIENT OUTREACH (OUTPATIENT)
Dept: CARE COORDINATION | Facility: CLINIC | Age: 40
End: 2025-08-05
Payer: COMMERCIAL

## (undated) DEVICE — ADH SKIN CLOSURE PREMIERPRO EXOFIN 1.0ML 3470

## (undated) DEVICE — ESU LIGASURE BLUNT TIP 5MM LF1537

## (undated) DEVICE — ENDO TROCAR SHIELDED BLADED KII Z-THRD 05X100MM CTB03

## (undated) DEVICE — ENDO TROCAR SLEEVE KII Z-THREADED 05X100MM CTS02

## (undated) DEVICE — SU VICRYL 4-0 PS-2 18" UND J496G

## (undated) DEVICE — NDL INSUFFLATION 13GA 120MM C2201

## (undated) DEVICE — GLOVE PROTEXIS W/NEU-THERA 7.5  2D73TE75

## (undated) DEVICE — SYR 10ML PREFILLED 0.9% NACL INJ NOT STERILE 306500

## (undated) DEVICE — PACK GENERAL LAPAOSCOPY

## (undated) RX ORDER — DEXAMETHASONE SODIUM PHOSPHATE 10 MG/ML
INJECTION, SOLUTION INTRAMUSCULAR; INTRAVENOUS
Status: DISPENSED
Start: 2019-08-12

## (undated) RX ORDER — LIDOCAINE HYDROCHLORIDE 10 MG/ML
INJECTION, SOLUTION EPIDURAL; INFILTRATION; INTRACAUDAL; PERINEURAL
Status: DISPENSED
Start: 2019-08-12

## (undated) RX ORDER — LIDOCAINE HYDROCHLORIDE 20 MG/ML
INJECTION, SOLUTION EPIDURAL; INFILTRATION; INTRACAUDAL; PERINEURAL
Status: DISPENSED
Start: 2019-08-12

## (undated) RX ORDER — DIMENHYDRINATE 50 MG/ML
INJECTION, SOLUTION INTRAMUSCULAR; INTRAVENOUS
Status: DISPENSED
Start: 2019-08-12

## (undated) RX ORDER — KETOROLAC TROMETHAMINE 30 MG/ML
INJECTION, SOLUTION INTRAMUSCULAR; INTRAVENOUS
Status: DISPENSED
Start: 2019-08-12

## (undated) RX ORDER — FENTANYL CITRATE 50 UG/ML
INJECTION, SOLUTION INTRAMUSCULAR; INTRAVENOUS
Status: DISPENSED
Start: 2019-08-12

## (undated) RX ORDER — PROPOFOL 10 MG/ML
INJECTION, EMULSION INTRAVENOUS
Status: DISPENSED
Start: 2019-08-12

## (undated) RX ORDER — ONDANSETRON 2 MG/ML
INJECTION INTRAMUSCULAR; INTRAVENOUS
Status: DISPENSED
Start: 2019-08-12

## (undated) RX ORDER — SCOLOPAMINE TRANSDERMAL SYSTEM 1 MG/1
PATCH, EXTENDED RELEASE TRANSDERMAL
Status: DISPENSED
Start: 2019-08-12

## (undated) RX ORDER — BUPIVACAINE HYDROCHLORIDE AND EPINEPHRINE 2.5; 5 MG/ML; UG/ML
INJECTION, SOLUTION EPIDURAL; INFILTRATION; INTRACAUDAL; PERINEURAL
Status: DISPENSED
Start: 2019-08-12